# Patient Record
Sex: FEMALE | Race: WHITE | NOT HISPANIC OR LATINO | Employment: OTHER | URBAN - METROPOLITAN AREA
[De-identification: names, ages, dates, MRNs, and addresses within clinical notes are randomized per-mention and may not be internally consistent; named-entity substitution may affect disease eponyms.]

---

## 2017-01-16 ENCOUNTER — MYC REFILL (OUTPATIENT)
Dept: FAMILY MEDICINE | Facility: CLINIC | Age: 52
End: 2017-01-16

## 2017-01-16 DIAGNOSIS — L71.9 ROSACEA: ICD-10-CM

## 2017-01-16 DIAGNOSIS — G47.09 OTHER INSOMNIA: ICD-10-CM

## 2017-01-17 RX ORDER — LORAZEPAM 0.5 MG/1
0.5 TABLET ORAL EVERY 8 HOURS PRN
Qty: 6 TABLET | Refills: 0 | Status: SHIPPED | OUTPATIENT
Start: 2017-01-17 | End: 2017-05-02

## 2017-01-17 NOTE — TELEPHONE ENCOUNTER
OK to fill metroniazole. Due for OV soon.   I have never seen her for insomnia and I do not use lorazepam to tx this. Dr. Chavez started her on this.   Will route to her.   PAULA Michelle, PA-C

## 2017-01-17 NOTE — TELEPHONE ENCOUNTER
Message from MyChart:  Original authorizing provider: FABI Landeros would like a refill of the following medications:  metroNIDAZOLE (METROCREAM) 0.75 % cream [Carmen Ross PA-C]  LORazepam (ATIVAN) 0.5 MG tablet [Carmen Ross PA-C]    Preferred pharmacy: [id number], [place] - [address]    Comment:

## 2017-01-17 NOTE — TELEPHONE ENCOUNTER
Metroniazole refilled per PCP.    Awaiting response from Dr. Chavez for lorazepam      Penelope Shah RN

## 2017-01-17 NOTE — TELEPHONE ENCOUNTER
Metronidazole      Last Written Prescription Date: 9/8/2015  Last Fill Quantity: 100 g,  # refills: 5   Last Office Visit with [facility], [facility] or  [facility] prescribing provider: 8/26/2016    Routing refill request to provider for review/approval because:  A break in medication    Lorazepam      Last Written Prescription Date:  1/2/2017  Last Fill Quantity: 10,   # refills: 0  Last Office Visit with [facility], [facility] or  [facility] prescribing provider: 8/17/2016  Future Office visit:       Routing refill request to provider for review/approval because:  Drug not on the [facility], [facility] or  [facility] refill protocol or controlled substance

## 2017-01-18 NOTE — TELEPHONE ENCOUNTER
Script faxed to [facility]  Patient informed of message below via [employer]  Annmarie Gregroy TC

## 2017-04-17 DIAGNOSIS — F41.9 ANXIETY: ICD-10-CM

## 2017-04-17 DIAGNOSIS — F33.0 MAJOR DEPRESSIVE DISORDER, RECURRENT EPISODE, MILD (H): Primary | ICD-10-CM

## 2017-04-17 DIAGNOSIS — I10 HYPERTENSION, GOAL BELOW 140/90: ICD-10-CM

## 2017-04-17 NOTE — TELEPHONE ENCOUNTER
Chlorthalidone    Last Written Prescription Date: 3/28/16  Last Fill Quantity: 45, # refills: 3  Last Office Visit with [facility], [facility] or  [facility] prescribing provider: 8/17/16       Potassium   Date Value Ref Range Status   08/17/2016 3.7 3.4 - 5.3 mmol/L Final     Creatinine   Date Value Ref Range Status   08/17/2016 0.68 0.52 - 1.04 mg/dL Final     BP Readings from Last 3 Encounters:   08/26/16 130/90   08/17/16 118/80   03/28/16 110/76     Bupropion    Last Written Prescription Date: 3/28/16  Last Fill Quantity: 90; # refills: 3  Last Office Visit with [facility], [facility] or [facility] prescribing provider:  8/17/16        Last PHQ-9 score on record=   PHQ-9 SCORE 8/26/2016   Total Score MyChart -   Total Score 5       Lab Results   Component Value Date    AST 20 08/17/2016     Lab Results   Component Value Date    ALT 32 08/17/2016     AGNIESZKA Frazier LPN

## 2017-04-19 RX ORDER — CHLORTHALIDONE 25 MG/1
TABLET ORAL
Qty: 45 TABLET | Refills: 0 | Status: SHIPPED | OUTPATIENT
Start: 2017-04-19 | End: 2017-05-02

## 2017-04-19 NOTE — TELEPHONE ENCOUNTER
Sent [employer] message asking if patient is still taking the bupropion. This was d/c'd off of medication list  Jesi Snyder RN  [facility]  [phone]

## 2017-04-20 RX ORDER — BUPROPION HYDROCHLORIDE 150 MG/1
TABLET, EXTENDED RELEASE ORAL
Qty: 60 TABLET | Refills: 0 | Status: SHIPPED | OUTPATIENT
Start: 2017-04-20 | End: 2017-05-02

## 2017-04-20 NOTE — TELEPHONE ENCOUNTER
Per sundeep rodas reply- she is still taking this medication and will schedule her f/u in May. phq9 sent to patient to complete  Routing refill request to provider for review/approval because:  Drug not active on patient's medication list      Jesi Snyder RN  [facility]  [phone]

## 2017-05-02 ENCOUNTER — OFFICE VISIT (OUTPATIENT)
Dept: FAMILY MEDICINE | Facility: CLINIC | Age: 52
End: 2017-05-02
Payer: COMMERCIAL

## 2017-05-02 VITALS
BODY MASS INDEX: 34.83 KG/M2 | HEART RATE: 64 BPM | OXYGEN SATURATION: 99 % | HEIGHT: 64 IN | WEIGHT: 204 LBS | TEMPERATURE: 98.3 F | DIASTOLIC BLOOD PRESSURE: 68 MMHG | SYSTOLIC BLOOD PRESSURE: 118 MMHG | RESPIRATION RATE: 16 BRPM

## 2017-05-02 DIAGNOSIS — I10 HYPERTENSION, GOAL BELOW 140/90: ICD-10-CM

## 2017-05-02 DIAGNOSIS — Z12.11 COLON CANCER SCREENING: ICD-10-CM

## 2017-05-02 DIAGNOSIS — E66.01 MORBID OBESITY, UNSPECIFIED OBESITY TYPE (H): ICD-10-CM

## 2017-05-02 DIAGNOSIS — F41.9 ANXIETY: ICD-10-CM

## 2017-05-02 DIAGNOSIS — L71.9 ROSACEA: ICD-10-CM

## 2017-05-02 DIAGNOSIS — F33.0 MAJOR DEPRESSIVE DISORDER, RECURRENT EPISODE, MILD (H): Primary | ICD-10-CM

## 2017-05-02 DIAGNOSIS — G60.9 IDIOPATHIC NEUROPATHY: ICD-10-CM

## 2017-05-02 DIAGNOSIS — G47.09 OTHER INSOMNIA: ICD-10-CM

## 2017-05-02 PROCEDURE — 99214 OFFICE O/P EST MOD 30 MIN: CPT | Performed by: PHYSICIAN ASSISTANT

## 2017-05-02 RX ORDER — GABAPENTIN 300 MG/1
CAPSULE ORAL
Qty: 60 CAPSULE | Refills: 0 | Status: SHIPPED | OUTPATIENT
Start: 2017-05-02 | End: 2017-10-31

## 2017-05-02 RX ORDER — BUPROPION HYDROCHLORIDE 150 MG/1
TABLET, EXTENDED RELEASE ORAL
Qty: 180 TABLET | Refills: 1 | Status: SHIPPED | OUTPATIENT
Start: 2017-05-02 | End: 2017-09-08

## 2017-05-02 RX ORDER — CITALOPRAM HYDROBROMIDE 20 MG/1
20 TABLET ORAL DAILY
Qty: 90 TABLET | Refills: 1 | Status: SHIPPED | OUTPATIENT
Start: 2017-05-02 | End: 2017-11-25

## 2017-05-02 RX ORDER — LORAZEPAM 0.5 MG/1
0.5 TABLET ORAL DAILY PRN
Qty: 10 TABLET | Refills: 0 | Status: SHIPPED | OUTPATIENT
Start: 2017-05-02 | End: 2017-10-31

## 2017-05-02 RX ORDER — CHLORTHALIDONE 25 MG/1
TABLET ORAL
Qty: 45 TABLET | Refills: 1 | Status: SHIPPED | OUTPATIENT
Start: 2017-05-02 | End: 2017-11-25

## 2017-05-02 NOTE — PATIENT INSTRUCTIONS
1. Start new medication - Gabapentin 300 mg at bedtime.   2. Let me know about pap smear.   3. Refills also sent.   4. Schedule colonoscopy

## 2017-05-02 NOTE — PROGRESS NOTES
"Chief Complaint   Patient presents with     Recheck Medication       Initial /68  Pulse 64  Temp 98.3  F (36.8  C)  Resp 16  Ht 5' 4\" (1.626 m)  Wt 204 lb (92.5 kg)  SpO2 99%  BMI 35.02 kg/m2 Estimated body mass index is 35.02 kg/(m^2) as calculated from the following:    Height as of this encounter: 5' 4\" (1.626 m).    Weight as of this encounter: 204 lb (92.5 kg).  Medication Reconciliation: complete. AGNIESZKA Frazier LPN        SUBJECTIVE:                                                    Taylor Baeza is a 51 year old female who presents to clinic today for the following health issues:      Medication Followup of Wellbutrin / ativan    Taking Medication as prescribed: yes - has been out of Ativan for awhile    Side Effects:  Fatigue and fuzzy headed    Medication Helping Symptoms:  Wants to discuss     Patient complains of feeling tired and fuzzy-headed; this has been ongoing and intermittent for a long time.   Wellbutrin 300, celexa 20. Ativan PRN. Last fill 1/17 for #6.     #2- HTN -Patient denies any chest pain, shortness of breath, orthopnea, PND, headaches, edema, vision changes. Due for refills. Labs last in August 16.     #3- Idiopathic neuropathy per neuro - taking wellbutrin and celexa for this - if misses a dose, her symptoms worsen.       -------------------------------------    Problem list and histories reviewed & adjusted, as indicated.  Additional history: as documented    Patient Active Problem List   Diagnosis     Hypothyroidism     Morbid obesity (H)     Rosacea     CARDIOVASCULAR SCREENING; LDL GOAL LESS THAN 130     Plantar fasciitis     Anxiety     Enthesopathy of ankle and tarsus     Snoring     GERD (gastroesophageal reflux disease)     Hypertension, goal below 140/90     Panic attacks     Vitamin D deficiency     Paresthesia     Hyperglycemia     Sleep disorder     Mild major depression (H)     Cystic acne     Other specified hypothyroidism     Hyperlipidemia LDL goal <130     " Hypertension     Hypothyroid     Other insomnia     Idiopathic neuropathy     Past Surgical History:   Procedure Laterality Date     TONSILLECTOMY  1970       Social History   Substance Use Topics     Smoking status: Never Smoker     Smokeless tobacco: Never Used     Alcohol use 0.0 oz/week     0 Standard drinks or equivalent per week      Comment: 2 x week      Family History   Problem Relation Age of Onset     DIABETES Sister      Thyroid Disease Father      Hypertension Father      Hypertension Mother      DIABETES Maternal Grandmother      Breast Cancer No family hx of      Cancer - colorectal No family hx of      C.A.D. No family hx of          Current Outpatient Prescriptions   Medication Sig Dispense Refill     gabapentin (NEURONTIN) 300 MG capsule Take 1 tablet (300 mg) every night, by mouth 60 capsule 0     buPROPion (WELLBUTRIN SR) 150 MG 12 hr tablet TAKE ONE TABLET BY MOUTH TWO TIMES A  tablet 1     chlorthalidone (HYGROTON) 25 MG tablet TAKE 1/2 TABLET BY MOUTH DAILY 45 tablet 1     metroNIDAZOLE (METROCREAM) 0.75 % cream 1 application at night for rosacea. 100 g 3     citalopram (CELEXA) 20 MG tablet Take 1 tablet (20 mg) by mouth daily 90 tablet 1     LORazepam (ATIVAN) 0.5 MG tablet Take 1 tablet (0.5 mg) by mouth daily as needed for anxiety 10 tablet 0     levothyroxine (SYNTHROID, LEVOTHROID) 112 MCG tablet TAKE 1 TABLET (112 MCG) BY MOUTH DAILY 90 tablet 3     fluocinolone acetonide 0.01 % OIL Place 5 drops in ear(s) 2 times daily as needed 60 mL 5     Cholecalciferol (VITAMIN D) 1000 UNITS capsule Take 2 capsules by mouth daily       fish oil-omega-3 fatty acids (OMEGA 3) 1000 MG capsule Take 1 capsule by mouth daily.       [DISCONTINUED] buPROPion (WELLBUTRIN SR) 150 MG 12 hr tablet TAKE ONE TABLET BY MOUTH TWO TIMES A DAY 60 tablet 0     [DISCONTINUED] chlorthalidone (HYGROTON) 25 MG tablet TAKE 1/2 TABLET BY MOUTH DAILY 45 tablet 0     [DISCONTINUED] citalopram (CELEXA) 20 MG tablet  "Take 1 tablet (20 mg) by mouth daily 30 tablet 1     Allergies   Allergen Reactions     No Known Allergies      Labs reviewed in EPIC    Reviewed and updated as needed this visit by clinical staff  Tobacco  Allergies  Meds  Fam Hx  Soc Hx      Reviewed and updated as needed this visit by Provider         Social History     Social History     Marital status:      Spouse name: N/A     Number of children: 1     Years of education: N/A     Occupational History     Teacher [employer]     Social History Main Topics     Smoking status: Never Smoker     Smokeless tobacco: Never Used     Alcohol use 0.0 oz/week     0 Standard drinks or equivalent per week      Comment: 2 x week      Drug use: No     Sexual activity: Yes     Partners: Male     Birth control/ protection: Post-menopausal     Other Topics Concern     None     Social History Narrative    7/2012        Children-1     Work-teacher     Tobacco- none    ETOH- 1 Q 2 weeks    Exercise-none                10 point review of systems negative other than symptoms noted above.   Constitutional, HEENT, CV, pulmonary, GI, , MS, Endo, Psych systems are all negative, except as otherwise noted.       OBJECTIVE:  /68  Pulse 64  Temp 98.3  F (36.8  C)  Resp 16  Ht 5' 4\" (1.626 m)  Wt 204 lb (92.5 kg)  SpO2 99%  BMI 35.02 kg/m2  CONSTITUTIONAL: Alert, well-nourished, well-groomed, NAD  RESP: Lungs CTA. No wheeze, rhonchi, rales. Normal effort on room air. Equal lung sounds bilaterally.   CV: HRRR, normal S1, S2. No MRG. No peripheral edema.  DERM: No rashes or suspicious lesions  PSYCH: Alert and oriented. Thought process is clear and goal-directed. Adequate insight and judgement. Mood - normal. Affect - normal.      Diagnostic Tests:  None today    ASSESSMENT/PLAN:  (F33.0) Major depressive disorder, recurrent episode, mild (H)  (primary encounter diagnosis)  Comment:   Plan: buPROPion (WELLBUTRIN SR) 150 MG 12 hr tablet,         " citalopram (CELEXA) 20 MG tablet            (G60.9) Idiopathic neuropathy  Comment: Will start her on low dose gabapentin in hopes of helping with her neuropathy and anxiety a little more.   She will update me in 2 weeks and we could consider increasing this   Plan: gabapentin (NEURONTIN) 300 MG capsule            (E66.01) Morbid obesity, unspecified obesity type (H)  Comment:   Plan: LSMs for weight loss - she wants to work on this to help with energy and moods.     (F41.9) Anxiety  Comment: as above.   Plan: gabapentin (NEURONTIN) 300 MG capsule,         buPROPion (WELLBUTRIN SR) 150 MG 12 hr tablet            (I10) Hypertension, goal below 140/90  Comment: stable. Labs due end of summer.   Plan: chlorthalidone (HYGROTON) 25 MG tablet            (L71.9) Rosacea  Comment: Ran out - refill today.   Plan: metroNIDAZOLE (METROCREAM) 0.75 % cream            (G47.09) Other insomnia  Comment: to use sparingly for anxiety, insomnia.   Plan: LORazepam (ATIVAN) 0.5 MG tablet            (Z12.11) Colon cancer screening  Comment: Will plan to do this summer after school year is over.   Plan: GASTROENTEROLOGY ADULT REF PROCEDURE ONLY              FOLLOW-UP: update me in 2 weeks on gabapentin/sxs. Sooner PRN.   The patient agrees with this assessment and plan and agrees to call or return to the clinic with any questions or concerns or if their condition worsens.    Carmen Ross, PAULA, PA-C  [facility]

## 2017-05-02 NOTE — MR AVS SNAPSHOT
After Visit Summary   5/2/2017    Taylor Baeza    MRN: [id number]           Patient Information     Date Of Birth          1965        Visit Information        Provider Department      5/2/2017 11:20 AM Carmen Ross PA-C [facility] [place]        Today's Diagnoses     Idiopathic neuropathy    -  1    Major depressive disorder, recurrent episode, mild (H)        Anxiety        Hypertension, goal below 140/90        Rosacea        Other insomnia        Colon cancer screening          Care Instructions    1. Start new medication - Gabapentin 300 mg at bedtime.   2. Let me know about pap smear.   3. Refills also sent.   4. Schedule colonoscopy        Follow-ups after your visit        Additional Services     GASTROENTEROLOGY ADULT REF PROCEDURE ONLY       Last Lab Result: Creatinine (mg/dL)       Date                     Value                 08/17/2016               0.68             ----------  Body mass index is 35.02 kg/(m^2).      Patient will be contacted to schedule procedure.     Please be aware that coverage of these services is subject to the terms and limitations of your health insurance plan.  Call member services at your health plan with any benefit or coverage questions.  Any procedures must be performed at a [facility] facility OR coordinated by your clinic's referral office.    Please bring the following with you to your appointment:    (1) Any X-Rays, CTs or MRIs which have been performed.  Contact the facility where they were done to arrange for  prior to your scheduled appointment.    (2) List of current medications   (3) This referral request   (4) Any documents/labs given to you for this referral                  Who to contact     If you have questions or need follow up information about today's clinic visit or your schedule please contact [facility] [place] directly at [phone].  Normal or non-critical lab and imaging results will be  "communicated to you by [employer], letter or phone within 4 business days after the clinic has received the results. If you do not hear from us within 7 days, please contact the clinic through [employer] or phone. If you have a critical or abnormal lab result, we will notify you by phone as soon as possible.  Submit refill requests through [employer] or call your pharmacy and they will forward the refill request to us. Please allow 3 business days for your refill to be completed.          Additional Information About Your Visit        [employer] Information     [employer] gives you secure access to your electronic health record. If you see a primary care provider, you can also send messages to your care team and make appointments. If you have questions, please call your primary care clinic.  If you do not have a primary care provider, please call [phone] and they will assist you.        Care EveryWhere ID     This is your Care EveryWhere ID. This could be used by other organizations to access your [place] medical records  [id number]        Your Vitals Were     Pulse Temperature Respirations Height Pulse Oximetry BMI (Body Mass Index)    64 98.3  F (36.8  C) 16 5' 4\" (1.626 m) 99% 35.02 kg/m2       Blood Pressure from Last 3 Encounters:   05/02/17 118/68   08/26/16 130/90   08/17/16 118/80    Weight from Last 3 Encounters:   05/02/17 204 lb (92.5 kg)   08/26/16 204 lb (92.5 kg)   08/17/16 203 lb (92.1 kg)              We Performed the Following     GASTROENTEROLOGY ADULT REF PROCEDURE ONLY          Today's Medication Changes          These changes are accurate as of: 5/2/17 11:47 AM.  If you have any questions, ask your nurse or doctor.               Start taking these medicines.        Dose/Directions    gabapentin 300 MG capsule   Commonly known as:  NEURONTIN   Used for:  Idiopathic neuropathy, Anxiety   Started by:  Carmen Ross PA-C        Take 1 tablet (300 mg) every night, by mouth   Quantity:  60 " capsule   Refills:  0         These medicines have changed or have updated prescriptions.        Dose/Directions    buPROPion 150 MG 12 hr tablet   Commonly known as:  WELLBUTRIN SR   This may have changed:  See the new instructions.   Used for:  Major depressive disorder, recurrent episode, mild (H), Anxiety   Changed by:  Carmen Ross PA-C        TAKE ONE TABLET BY MOUTH TWO TIMES A DAY   Quantity:  180 tablet   Refills:  1       chlorthalidone 25 MG tablet   Commonly known as:  HYGROTON   This may have changed:  See the new instructions.   Used for:  Hypertension, goal below 140/90   Changed by:  Carmen Ross PA-C        TAKE 1/2 TABLET BY MOUTH DAILY   Quantity:  45 tablet   Refills:  1       LORazepam 0.5 MG tablet   Commonly known as:  ATIVAN   This may have changed:  when to take this   Used for:  Other insomnia   Changed by:  Carmen Ross PA-C        Dose:  0.5 mg   Take 1 tablet (0.5 mg) by mouth daily as needed for anxiety   Quantity:  10 tablet   Refills:  0       metroNIDAZOLE 0.75 % cream   Commonly known as:  METROCREAM   This may have changed:  additional instructions   Used for:  Rosacea   Changed by:  Carmen Ross PA-C        1 application at night for rosacea.   Quantity:  100 g   Refills:  3            Where to get your medicines      These medications were sent to [id number] - [place], [place] - [address]  [address] [address]     Phone:  [phone]     buPROPion 150 MG 12 hr tablet    chlorthalidone 25 MG tablet    citalopram 20 MG tablet    gabapentin 300 MG capsule    metroNIDAZOLE 0.75 % cream         Some of these will need a paper prescription and others can be bought over the counter.  Ask your nurse if you have questions.     Bring a paper prescription for each of these medications     LORazepam 0.5 MG tablet                Primary Care Provider Office Phone # Fax #    Carmen Ross PA-C  [phone]       [facility] [place] [address]  [place] [address]        Thank you!     Thank you for choosing [facility] [place]  for your care. Our goal is always to provide you with excellent care. Hearing back from our patients is one way we can continue to improve our services. Please take a few minutes to complete the written survey that you may receive in the mail after your visit with us. Thank you!             Your Updated Medication List - Protect others around you: Learn how to safely use, store and throw away your medicines at www.disposemymeds.org.          This list is accurate as of: 5/2/17 11:47 AM.  Always use your most recent med list.                   Brand Name Dispense Instructions for use    buPROPion 150 MG 12 hr tablet    WELLBUTRIN SR    180 tablet    TAKE ONE TABLET BY MOUTH TWO TIMES A DAY       chlorthalidone 25 MG tablet    HYGROTON    45 tablet    TAKE 1/2 TABLET BY MOUTH DAILY       citalopram 20 MG tablet    celeXA    90 tablet    Take 1 tablet (20 mg) by mouth daily       fish oil-omega-3 fatty acids 1000 MG capsule      Take 1 capsule by mouth daily.       fluocinolone acetonide 0.01 % Oil     60 mL    Place 5 drops in ear(s) 2 times daily as needed       gabapentin 300 MG capsule    NEURONTIN    60 capsule    Take 1 tablet (300 mg) every night, by mouth       levothyroxine 112 MCG tablet    SYNTHROID/LEVOTHROID    90 tablet    TAKE 1 TABLET (112 MCG) BY MOUTH DAILY       LORazepam 0.5 MG tablet    ATIVAN    10 tablet    Take 1 tablet (0.5 mg) by mouth daily as needed for anxiety       metroNIDAZOLE 0.75 % cream    METROCREAM    100 g    1 application at night for rosacea.       vitamin D 1000 UNITS capsule      Take 2 capsules by mouth daily

## 2017-06-07 ENCOUNTER — TELEPHONE (OUTPATIENT)
Dept: PALLIATIVE MEDICINE | Facility: CLINIC | Age: 52
End: 2017-06-07

## 2017-06-07 NOTE — TELEPHONE ENCOUNTER
Pain Management Center Referral      1. Confirmed address with patient? Yes  2. Confirmed phone number with patient? Yes  3. Confirmed referring provider? Yes  4. Is the PCP the same as the referring provider? Yes  5. Has the patient been to any previous pain clinics? No  (If yes, send NOHEMI with welcome letter)  6. Which insurance are we to bill for this appointment?  HP    7. Informed pt of cancellation (48 hour) policy? Yes    REGARDING OPIOID MEDICATIONS: We will always address appropriateness of opioid pain medications, but we generally will not automatically take on a prescribing role. When we do take on prescribing of opioids for chronic pain, it is in collaboration with the referring physician for an intermediate period of time (months), with an expectation that the primary physician or provider will assume the prescribing role if medications are effective at stable doses with demonstrated compliance. Therefore, please do not assume that your prescribing responsibilities end on the day of pain clinic consultation.  7. Informed pt of prescribing policy? Yes      8. Referring Provider: Carmen Ross    [facility]

## 2017-06-07 NOTE — LETTER
June 7, 2017    Taylor Baeza  [address] [place] [address]    Dear Taylor,                                                                 Welcome to the [facility] at the [facility] [place]. We are located on the 6th floor, [address], of the [facility] located at [address] [place] [address]. For general parking, the [address] is the closest to our building.     Your appointment at the [facility] has been scheduled on Wednesday, July 19TH at 8:00AM with Gemini Neal NP.    At your first visit, you will meet your team of caregivers who will help you to develop pain management strategies that will last a lifetime. You will meet with our support staff to validate parking at a reduced rate, review your insurance information, and collect your co-payment if required by your insurance company. You will also meet with a medical pain specialist and care coordinator who will assess your pain and develop a plan of care for your successful pain rehabilitation. You should expect to spend 1-2 hours at your first visit with us. Usually, patients work with us for a period of 6-12 months, and eventually return to their primary doctor once their pain management has stabilized.      To help us make your visit go as smoothly as possible, please bring the following items with you on your visit:   Completed Pain Questionnaire enclosed in this packet.  If you do not bring the completed questionnaire, we may have to reschedule your appointment.  List of any medicines that you are currently taking or have been prescribed  Important NON-[facility] medical information such as medical records or tests results (X-rays, or laboratory tests)  Your health insurance card  Financial resources to cover your co-payment or balance due at the time of service (cash, personal check, Visa, and MasterCard are acceptable methods of  payment)     Due to the demand for new patient evaluations, you must notify the scheduling department 48 hours in advance if you are not able to keep this appointment.  Failure to do so could affect your ability to reschedule with our clinic. Please do not assume that you will  receive any prescription medications at your first visit.    Please call [phone] with any questions regarding your appointment.  We look forward to meeting you and working to address your health care needs.       Sincerely,      [facility]

## 2017-09-08 ENCOUNTER — RADIANT APPOINTMENT (OUTPATIENT)
Dept: GENERAL RADIOLOGY | Facility: CLINIC | Age: 52
End: 2017-09-08
Attending: FAMILY MEDICINE
Payer: COMMERCIAL

## 2017-09-08 ENCOUNTER — OFFICE VISIT (OUTPATIENT)
Dept: FAMILY MEDICINE | Facility: CLINIC | Age: 52
End: 2017-09-08
Payer: COMMERCIAL

## 2017-09-08 VITALS
DIASTOLIC BLOOD PRESSURE: 80 MMHG | HEART RATE: 60 BPM | OXYGEN SATURATION: 99 % | BODY MASS INDEX: 34.15 KG/M2 | TEMPERATURE: 97.9 F | RESPIRATION RATE: 16 BRPM | SYSTOLIC BLOOD PRESSURE: 128 MMHG | WEIGHT: 200 LBS | HEIGHT: 64 IN

## 2017-09-08 DIAGNOSIS — R76.11 POSITIVE TB TEST: Primary | ICD-10-CM

## 2017-09-08 DIAGNOSIS — R76.11 POSITIVE TB TEST: ICD-10-CM

## 2017-09-08 PROCEDURE — 36415 COLL VENOUS BLD VENIPUNCTURE: CPT | Performed by: FAMILY MEDICINE

## 2017-09-08 PROCEDURE — 86480 TB TEST CELL IMMUN MEASURE: CPT | Performed by: FAMILY MEDICINE

## 2017-09-08 PROCEDURE — 71020 XR CHEST 2 VW: CPT

## 2017-09-08 PROCEDURE — 99214 OFFICE O/P EST MOD 30 MIN: CPT | Performed by: FAMILY MEDICINE

## 2017-09-08 ASSESSMENT — ANXIETY QUESTIONNAIRES
1. FEELING NERVOUS, ANXIOUS, OR ON EDGE: SEVERAL DAYS
GAD7 TOTAL SCORE: 5
3. WORRYING TOO MUCH ABOUT DIFFERENT THINGS: SEVERAL DAYS
7. FEELING AFRAID AS IF SOMETHING AWFUL MIGHT HAPPEN: SEVERAL DAYS
6. BECOMING EASILY ANNOYED OR IRRITABLE: SEVERAL DAYS
2. NOT BEING ABLE TO STOP OR CONTROL WORRYING: SEVERAL DAYS
IF YOU CHECKED OFF ANY PROBLEMS ON THIS QUESTIONNAIRE, HOW DIFFICULT HAVE THESE PROBLEMS MADE IT FOR YOU TO DO YOUR WORK, TAKE CARE OF THINGS AT HOME, OR GET ALONG WITH OTHER PEOPLE: SOMEWHAT DIFFICULT
5. BEING SO RESTLESS THAT IT IS HARD TO SIT STILL: NOT AT ALL

## 2017-09-08 ASSESSMENT — PATIENT HEALTH QUESTIONNAIRE - PHQ9
SUM OF ALL RESPONSES TO PHQ QUESTIONS 1-9: 1
5. POOR APPETITE OR OVEREATING: NOT AT ALL

## 2017-09-08 NOTE — PROGRESS NOTES
"Chief Complaint   Patient presents with     Results     positive TB       Initial /80  Pulse 60  Temp 97.9  F (36.6  C)  Resp 16  Ht 5' 4\" (1.626 m)  Wt 200 lb (90.7 kg)  SpO2 99%  BMI 34.33 kg/m2 Estimated body mass index is 34.33 kg/(m^2) as calculated from the following:    Height as of this encounter: 5' 4\" (1.626 m).    Weight as of this encounter: 200 lb (90.7 kg).  Medication Reconciliation: complete. AGNIESZKA Frazier LPN        SUBJECTIVE:   Taylor Baeza is a 52 year old female who presents to clinic today for the following health issues:      Concern - discuss recent positive TB test.  Patient is pleasant 52 year old female who came today to discuss positive quantiferon gold test.  She has been volunteering in different medical care facility and takes her therapy dog . She has recent travel to [place].  She has similar test done in march of this year which was negative, and later in August she did it again it turn positive.  She does not have nay active symptoms. No chest pain, no shortness of breath, no cough , blood in cough, no weight loss.  He is  in elementary school.   Denies any know exposure.   she does go to different medical facilities for volunteering. Denies nay know  Exposure.            Reviewed and updated as needed this visit by clinical staffTobacco  Allergies  Meds  Fam Hx  Soc Hx      Reviewed and updated as needed this visit by Provider         ROS:  C: NEGATIVE for fever, chills, change in weight  E/M: NEGATIVE for ear, mouth and throat problems  R: NEGATIVE for significant cough or SOB  CV: NEGATIVE for chest pain, palpitations or peripheral edema    OBJECTIVE:                                                    /80  Pulse 60  Temp 97.9  F (36.6  C)  Resp 16  Ht 5' 4\" (1.626 m)  Wt 200 lb (90.7 kg)  SpO2 99%  BMI 34.33 kg/m2  Body mass index is 34.33 kg/(m^2).   GENERAL: healthy, alert, well nourished, well hydrated, no " distress  HENT: ear canals- normal; TMs- normal; Nose- normal; Mouth- no ulcers, no lesions  NECK: no tenderness, no adenopathy, no asymmetry, no masses, no stiffness; thyroid- normal to palpation  RESP: lungs clear to auscultation - no rales, no rhonchi, no wheezes  CV: regular rates and rhythm, normal S1 S2, no S3 or S4 and no murmur, no click or rub -  ABDOMEN: soft, no tenderness, no  hepatosplenomegaly, no masses, normal bowel sounds         ASSESSMENT/PLAN:                                                        ICD-10-CM    1. Positive TB test R76.11 M Tuberculosis by Quantiferon     XR Chest 2 Views       Patient has recent positive Quantiferon gold with previous negative test. She is asymptomatic  Her CXR is normal. Will recheck Quantiferon gold test again. If positive she may have underlain latent TB, will refer her to ID for any evaluation or treatment if necessary.    Honorio Castellanos MD  [facility]

## 2017-09-08 NOTE — MR AVS SNAPSHOT
"              After Visit Summary   9/8/2017    Taylor Baeza    MRN: [id number]           Patient Information     Date Of Birth          1965        Visit Information        Provider Department      9/8/2017 10:20 AM Honorio Castellanos MD [facility] [place]        Today's Diagnoses     Positive TB test    -  1       Follow-ups after your visit        Who to contact     If you have questions or need follow up information about today's clinic visit or your schedule please contact [facility] [place] directly at [phone].  Normal or non-critical lab and imaging results will be communicated to you by [employer], letter or phone within 4 business days after the clinic has received the results. If you do not hear from us within 7 days, please contact the clinic through [employer] or phone. If you have a critical or abnormal lab result, we will notify you by phone as soon as possible.  Submit refill requests through [employer] or call your pharmacy and they will forward the refill request to us. Please allow 3 business days for your refill to be completed.          Additional Information About Your Visit        MyChart Information     [employer] gives you secure access to your electronic health record. If you see a primary care provider, you can also send messages to your care team and make appointments. If you have questions, please call your primary care clinic.  If you do not have a primary care provider, please call [phone] and they will assist you.        Care EveryWhere ID     This is your Care EveryWhere ID. This could be used by other organizations to access your [place] medical records  [id number]        Your Vitals Were     Pulse Temperature Respirations Height Pulse Oximetry BMI (Body Mass Index)    60 97.9  F (36.6  C) 16 5' 4\" (1.626 m) 99% 34.33 kg/m2       Blood Pressure from Last 3 Encounters:   09/08/17 128/80   05/02/17 118/68   08/26/16 130/90    Weight from Last 3 Encounters: "   09/08/17 200 lb (90.7 kg)   05/02/17 204 lb (92.5 kg)   08/26/16 204 lb (92.5 kg)              We Performed the Following     M Tuberculosis by Quantiferon        Primary Care Provider Office Phone # Fax #    Carmen Ross PA-C [phone]       [address]  [place] [address]        Equal Access to Services     [facility]: Hadii aad ku hadasho Soomaali, waaxda luqadaha, qaybta kaalmada adeegyada, waxay idiin hayaan adeeg kharash la'aan . So [facility] [phone].    ATENCIÓN: Si habla español, tiene a rome disposición servicios gratuitos de asistencia lingüística. [facility] [phone].    We comply with applicable federal civil rights laws and [place] laws. We do not discriminate on the basis of race, color, national origin, age, disability sex, sexual orientation or gender identity.            Thank you!     Thank you for choosing [facility] [place]  for your care. Our goal is always to provide you with excellent care. Hearing back from our patients is one way we can continue to improve our services. Please take a few minutes to complete the written survey that you may receive in the mail after your visit with us. Thank you!             Your Updated Medication List - Protect others around you: Learn how to safely use, store and throw away your medicines at www.disposemymeds.org.          This list is accurate as of: 9/8/17 11:07 AM.  Always use your most recent med list.                   Brand Name Dispense Instructions for use Diagnosis    chlorthalidone 25 MG tablet    HYGROTON    45 tablet    TAKE 1/2 TABLET BY MOUTH DAILY    Hypertension, goal below 140/90       citalopram 20 MG tablet    celeXA    90 tablet    Take 1 tablet (20 mg) by mouth daily    Major depressive disorder, recurrent episode, mild (H)       fish oil-omega-3 fatty acids 1000 MG capsule      Take 1 capsule by mouth daily.        fluocinolone acetonide 0.01 % Oil     60 mL    Place 5 drops  in ear(s) 2 times daily as needed    Otitis externa, unspecified laterality       gabapentin 300 MG capsule    NEURONTIN    60 capsule    Take 1 tablet (300 mg) every night, by mouth    Idiopathic neuropathy, Anxiety       levothyroxine 112 MCG tablet    SYNTHROID/LEVOTHROID    90 tablet    TAKE 1 TABLET (112 MCG) BY MOUTH DAILY    Hypothyroidism, unspecified type       LORazepam 0.5 MG tablet    ATIVAN    10 tablet    Take 1 tablet (0.5 mg) by mouth daily as needed for anxiety    Other insomnia       metroNIDAZOLE 0.75 % cream    METROCREAM    100 g    1 application at night for rosacea.    Rosacea       vitamin D 1000 UNITS capsule      Take 2 capsules by mouth daily

## 2017-09-09 ASSESSMENT — ANXIETY QUESTIONNAIRES: GAD7 TOTAL SCORE: 5

## 2017-09-11 LAB
M TB TUBERC IFN-G BLD QL: NEGATIVE
M TB TUBERC IFN-G/MITOGEN IGNF BLD: 0.21 IU/ML

## 2017-09-17 ENCOUNTER — HEALTH MAINTENANCE LETTER (OUTPATIENT)
Age: 52
End: 2017-09-17

## 2017-09-27 DIAGNOSIS — E03.9 HYPOTHYROIDISM, UNSPECIFIED TYPE: ICD-10-CM

## 2017-09-27 NOTE — TELEPHONE ENCOUNTER
levothyroxine (SYNTHROID, LEVOTHROID) 112 MCG     Last Written Prescription Date: 8/29/16  Last Quantity: 90, # refills: 3  Last Office Visit with G, P or [facility] prescribing provider: 9/8/17   Next 5 appointments (look out 90 days)     Oct 31, 2017 12:00 PM CDT   PHYSICAL with Lauren Brown MD   [facility] ([facility])    [address] [place] [id number] [phone]                   TSH   Date Value Ref Range Status   08/17/2016 0.15 (L) 0.40 - 4.00 mU/L Final

## 2017-09-28 RX ORDER — LEVOTHYROXINE SODIUM 112 UG/1
TABLET ORAL
Qty: 30 TABLET | Refills: 0 | Status: SHIPPED | OUTPATIENT
Start: 2017-09-28 | End: 2017-11-25

## 2017-09-28 NOTE — TELEPHONE ENCOUNTER
OKd #30 - future labs ordered. Needs to come for lab before any further refills.   PAULA Michelle, PAJesC

## 2017-09-28 NOTE — TELEPHONE ENCOUNTER
Routing refill request to provider for review/approval because:  Labs out of range:  TSH  Labs not current:  TSH  Per chart review, patient has appt for physical next month 10/31/17 at [facility] clinic     Penelope Shah RN

## 2017-10-02 ENCOUNTER — MYC MEDICAL ADVICE (OUTPATIENT)
Dept: FAMILY MEDICINE | Facility: CLINIC | Age: 52
End: 2017-10-02

## 2017-10-02 DIAGNOSIS — G47.09 OTHER INSOMNIA: ICD-10-CM

## 2017-10-02 RX ORDER — LORAZEPAM 0.5 MG/1
0.5 TABLET ORAL DAILY PRN
Qty: 10 TABLET | Refills: 0 | Status: CANCELLED | OUTPATIENT
Start: 2017-10-02

## 2017-10-02 NOTE — TELEPHONE ENCOUNTER
Patient notified with information noted below from provider and states now back at school she is having difficulty un-winding and not being able to fall asleep.  Ok with not having Lorazepam and will  something OTC to help sleep.  Mee Mulligan RN - Triage  [facility]

## 2017-10-02 NOTE — TELEPHONE ENCOUNTER
Please see MyChart message below and advise.     lorazepam      Last Written Prescription Date:  5/2/17  Last Fill Quantity: 10,   # refills: 0  Last Office Visit with [facility], [facility] or [facility] prescribing provider: 9/8/17  Future Office visit:    Next 5 appointments (look out 90 days)     Oct 31, 2017 12:00 PM CDT   PHYSICAL with Lauren Brown MD   [facility]    [address] [place] [id number] [phone]                   Routing refill request to provider for review/approval because:  Drug not on the [facility], [facility] or [facility] refill protocol or controlled substance    Jeanette Meyers RN   [facility] - Triage

## 2017-10-02 NOTE — TELEPHONE ENCOUNTER
Non-detailed message left to return our call.  Mee Mulligan RN - Triage  [facility]

## 2017-10-02 NOTE — TELEPHONE ENCOUNTER
Patient was prescribed this medication one time with limited supply. If she has issues with sleeping she should follow up in the clinic to discuss further.

## 2017-10-11 NOTE — TELEPHONE ENCOUNTER
Pt has not read [employer] message. Called pt, advised she needs lab only appt prior to anymore refills, she will call back to schedule.Jean CUETO CMA

## 2017-10-19 ENCOUNTER — TRANSFERRED RECORDS (OUTPATIENT)
Dept: HEALTH INFORMATION MANAGEMENT | Facility: CLINIC | Age: 52
End: 2017-10-19

## 2017-10-19 ENCOUNTER — SURGERY (OUTPATIENT)
Age: 52
End: 2017-10-19

## 2017-10-19 ENCOUNTER — HOSPITAL ENCOUNTER (OUTPATIENT)
Facility: CLINIC | Age: 52
Discharge: HOME | End: 2017-10-19
Attending: INTERNAL MEDICINE | Admitting: INTERNAL MEDICINE
Payer: COMMERCIAL

## 2017-10-19 VITALS
RESPIRATION RATE: 13 BRPM | BODY MASS INDEX: 35.85 KG/M2 | DIASTOLIC BLOOD PRESSURE: 79 MMHG | HEIGHT: 64 IN | OXYGEN SATURATION: 94 % | WEIGHT: 210 LBS | SYSTOLIC BLOOD PRESSURE: 119 MMHG

## 2017-10-19 LAB — COLONOSCOPY: NORMAL

## 2017-10-19 PROCEDURE — 45380 COLONOSCOPY AND BIOPSY: CPT | Mod: PT | Performed by: INTERNAL MEDICINE

## 2017-10-19 PROCEDURE — 25000128 H RX IP 250 OP 636: Performed by: INTERNAL MEDICINE

## 2017-10-19 PROCEDURE — G0500 MOD SEDAT ENDO SERVICE >5YRS: HCPCS | Performed by: INTERNAL MEDICINE

## 2017-10-19 PROCEDURE — 88305 TISSUE EXAM BY PATHOLOGIST: CPT | Performed by: INTERNAL MEDICINE

## 2017-10-19 PROCEDURE — 88305 TISSUE EXAM BY PATHOLOGIST: CPT | Mod: 26 | Performed by: INTERNAL MEDICINE

## 2017-10-19 RX ORDER — ONDANSETRON 2 MG/ML
4 INJECTION INTRAMUSCULAR; INTRAVENOUS
Status: DISCONTINUED | OUTPATIENT
Start: 2017-10-19 | End: 2017-10-19 | Stop reason: HOSPADM

## 2017-10-19 RX ORDER — LIDOCAINE 40 MG/G
CREAM TOPICAL
Status: DISCONTINUED | OUTPATIENT
Start: 2017-10-19 | End: 2017-10-19 | Stop reason: HOSPADM

## 2017-10-19 RX ORDER — FENTANYL CITRATE 50 UG/ML
INJECTION, SOLUTION INTRAMUSCULAR; INTRAVENOUS PRN
Status: DISCONTINUED | OUTPATIENT
Start: 2017-10-19 | End: 2017-10-19 | Stop reason: HOSPADM

## 2017-10-19 RX ADMIN — FENTANYL CITRATE 100 MCG: 50 INJECTION, SOLUTION INTRAMUSCULAR; INTRAVENOUS at 08:37

## 2017-10-19 RX ADMIN — MIDAZOLAM HYDROCHLORIDE 2 MG: 1 INJECTION, SOLUTION INTRAMUSCULAR; INTRAVENOUS at 08:39

## 2017-10-19 RX ADMIN — MIDAZOLAM HYDROCHLORIDE 2 MG: 1 INJECTION, SOLUTION INTRAMUSCULAR; INTRAVENOUS at 08:37

## 2017-10-20 LAB — COPATH REPORT: NORMAL

## 2017-10-31 ENCOUNTER — RADIANT APPOINTMENT (OUTPATIENT)
Dept: MAMMOGRAPHY | Facility: CLINIC | Age: 52
End: 2017-10-31
Payer: COMMERCIAL

## 2017-10-31 ENCOUNTER — OFFICE VISIT (OUTPATIENT)
Dept: OBGYN | Facility: CLINIC | Age: 52
End: 2017-10-31
Payer: COMMERCIAL

## 2017-10-31 VITALS
SYSTOLIC BLOOD PRESSURE: 118 MMHG | HEART RATE: 68 BPM | DIASTOLIC BLOOD PRESSURE: 80 MMHG | HEIGHT: 64 IN | BODY MASS INDEX: 34.89 KG/M2 | WEIGHT: 204.4 LBS

## 2017-10-31 DIAGNOSIS — Z12.31 VISIT FOR SCREENING MAMMOGRAM: ICD-10-CM

## 2017-10-31 DIAGNOSIS — E03.9 HYPOTHYROIDISM, UNSPECIFIED TYPE: ICD-10-CM

## 2017-10-31 DIAGNOSIS — Z01.419 ENCOUNTER FOR GYNECOLOGICAL EXAMINATION WITHOUT ABNORMAL FINDING: Primary | ICD-10-CM

## 2017-10-31 DIAGNOSIS — Z13.21 ENCOUNTER FOR VITAMIN DEFICIENCY SCREENING: ICD-10-CM

## 2017-10-31 PROCEDURE — 82306 VITAMIN D 25 HYDROXY: CPT | Performed by: OBSTETRICS & GYNECOLOGY

## 2017-10-31 PROCEDURE — G0202 SCR MAMMO BI INCL CAD: HCPCS | Mod: TC

## 2017-10-31 PROCEDURE — 84439 ASSAY OF FREE THYROXINE: CPT | Performed by: OBSTETRICS & GYNECOLOGY

## 2017-10-31 PROCEDURE — 99396 PREV VISIT EST AGE 40-64: CPT | Performed by: OBSTETRICS & GYNECOLOGY

## 2017-10-31 PROCEDURE — G0145 SCR C/V CYTO,THINLAYER,RESCR: HCPCS | Performed by: OBSTETRICS & GYNECOLOGY

## 2017-10-31 PROCEDURE — 77063 BREAST TOMOSYNTHESIS BI: CPT | Mod: TC

## 2017-10-31 PROCEDURE — 99213 OFFICE O/P EST LOW 20 MIN: CPT | Mod: 25 | Performed by: OBSTETRICS & GYNECOLOGY

## 2017-10-31 PROCEDURE — 36415 COLL VENOUS BLD VENIPUNCTURE: CPT | Performed by: OBSTETRICS & GYNECOLOGY

## 2017-10-31 PROCEDURE — 84443 ASSAY THYROID STIM HORMONE: CPT | Performed by: OBSTETRICS & GYNECOLOGY

## 2017-10-31 PROCEDURE — 87624 HPV HI-RISK TYP POOLED RSLT: CPT | Performed by: OBSTETRICS & GYNECOLOGY

## 2017-10-31 ASSESSMENT — ANXIETY QUESTIONNAIRES
6. BECOMING EASILY ANNOYED OR IRRITABLE: NOT AT ALL
IF YOU CHECKED OFF ANY PROBLEMS ON THIS QUESTIONNAIRE, HOW DIFFICULT HAVE THESE PROBLEMS MADE IT FOR YOU TO DO YOUR WORK, TAKE CARE OF THINGS AT HOME, OR GET ALONG WITH OTHER PEOPLE: VERY DIFFICULT
3. WORRYING TOO MUCH ABOUT DIFFERENT THINGS: SEVERAL DAYS
1. FEELING NERVOUS, ANXIOUS, OR ON EDGE: NEARLY EVERY DAY
GAD7 TOTAL SCORE: 7
7. FEELING AFRAID AS IF SOMETHING AWFUL MIGHT HAPPEN: NOT AT ALL
5. BEING SO RESTLESS THAT IT IS HARD TO SIT STILL: SEVERAL DAYS
2. NOT BEING ABLE TO STOP OR CONTROL WORRYING: SEVERAL DAYS

## 2017-10-31 ASSESSMENT — PATIENT HEALTH QUESTIONNAIRE - PHQ9
5. POOR APPETITE OR OVEREATING: SEVERAL DAYS
SUM OF ALL RESPONSES TO PHQ QUESTIONS 1-9: 18

## 2017-10-31 NOTE — PROGRESS NOTES
Taylor is a 52 year old  female who presents for annual exam.     Besides routine health maintenance,  she would like to discuss fatigue. Wants to have thyroid labs today. Gets medications through pcp.    HPI:  The patient's PCP is Ashley Chavez MD.    Patient has been feeling very tired  Is on thyroid medication for years so wonders if that is the cause  No other symptoms of low thyroid like serious constipation, hair loss, very dry skin        GYNECOLOGIC HISTORY:    No LMP recorded. Patient is postmenopausal.  Her current contraception method is: menopause.  She  reports that she has never smoked. She has never used smokeless tobacco.      Patient is sexually active.  STD testing offered?  Declined  Last PHQ-9 score on record =   PHQ-9 SCORE 10/31/2017   Total Score -   Total Score MyChart -   Total Score 18     Last GAD7 score on record =   GAVIN-7 SCORE 10/31/2017   Total Score -   Total Score 7     Alcohol Score = 2    HEALTH MAINTENANCE:  Cholesterol: 3/29/16   Total= 220, Triglycerides=91, HDL=78, KCD=850, FBS=95, TSH=16-0.15  Last Mammo: 16, Result: normal, Next Mammo: today   Pap: 12 neg, HPV-  Colonoscopy:  10/19/17, Result: polyp, Next Colonoscopy: 10 years.  Dexa:  Never    Health maintenance updated:  yes    HISTORY:  Obstetric History       T1      L1     SAB0   TAB0   Ectopic0   Multiple0   Live Births1       # Outcome Date GA Lbr Dion/2nd Weight Sex Delivery Anes PTL Lv   1 Term         CAROL ANN          Patient Active Problem List   Diagnosis     Hypothyroidism     Morbid obesity (H)     Rosacea     CARDIOVASCULAR SCREENING; LDL GOAL LESS THAN 130     Plantar fasciitis     Anxiety     Enthesopathy of ankle and tarsus     Snoring     GERD (gastroesophageal reflux disease)     Hypertension, goal below 140/90     Panic attacks     Vitamin D deficiency     Paresthesia     Hyperglycemia     Sleep disorder     Mild major depression (H)     Cystic acne     Other specified  hypothyroidism     Hyperlipidemia LDL goal <130     Hypertension     Hypothyroid     Other insomnia     Idiopathic neuropathy     Past Surgical History:   Procedure Laterality Date     BREAST SURGERY      breast reduction     COLONOSCOPY N/A 10/19/2017    Procedure: COMBINED COLONOSCOPY, SINGLE OR MULTIPLE BIOPSY/POLYPECTOMY BY BIOPSY;  colonoscopy;  Surgeon: Marquise Haskins MD;  Location:  GI     TONSILLECTOMY  1970      Social History   Substance Use Topics     Smoking status: Never Smoker     Smokeless tobacco: Never Used     Alcohol use 0.0 oz/week     0 Standard drinks or equivalent per week      Comment: 2 x week       Problem (# of Occurrences) Relation (Name,Age of Onset)    DIABETES (2) Sister, Maternal Grandmother    Hypertension (2) Father, Mother    Thyroid Disease (1) Father       Negative family history of: Breast Cancer, Cancer - colorectal, C.A.D.            Current Outpatient Prescriptions   Medication Sig     BuPROPion HCl (WELLBUTRIN PO)      levothyroxine (SYNTHROID/LEVOTHROID) 112 MCG tablet TAKE 1 TABLET (112 MCG) BY MOUTH DAILY     chlorthalidone (HYGROTON) 25 MG tablet TAKE 1/2 TABLET BY MOUTH DAILY     metroNIDAZOLE (METROCREAM) 0.75 % cream 1 application at night for rosacea.     citalopram (CELEXA) 20 MG tablet Take 1 tablet (20 mg) by mouth daily     fluocinolone acetonide 0.01 % OIL Place 5 drops in ear(s) 2 times daily as needed     Cholecalciferol (VITAMIN D) 1000 UNITS capsule Take 2 capsules by mouth daily     fish oil-omega-3 fatty acids (OMEGA 3) 1000 MG capsule Take 1 capsule by mouth daily.     No current facility-administered medications for this visit.      Allergies   Allergen Reactions     No Known Allergies        Past medical, surgical, social and family histories were reviewed and updated in EPIC.    ROS:   12 point review of systems negative other than symptoms noted below.  Constitutional: Fatigue and Weight Gain  Gastrointestinal: Diarrhea and Heartburn  Skin:  "Acne  Neurologic: Headaches and Tremors  Musculoskeletal: Muscular Weakness  Endocrine: Cold Intolerance, Decreased Libido and Excessive Thirst  Psychiatric: Anxiety, Depression and Difficulty Sleeping    EXAM:  /80  Pulse 68  Ht 1.626 m (5' 4\")  Wt 92.7 kg (204 lb 6.4 oz)  BMI 35.09 kg/m2   BMI: Body mass index is 35.09 kg/(m^2).    PHYSICAL EXAM:  Constitutional:  Appearance: Well nourished, well developed, alert, in no acute distress  Neck:  Lymph Nodes:  No lymphadenopathy present    Thyroid:  Gland size normal, nontender, no nodules or masses present  on palpation  Chest:  Respiratory Effort:  Breathing unlabored  Cardiovascular:    Heart: Auscultation:  Regular rate, normal rhythm, no murmurs present  Breasts: Inspection of Breasts:  No lymphadenopathy present., Palpation of Breasts and Axillae:  No masses present on palpation, no breast tenderness., Axillary Lymph Nodes:  No lymphadenopathy present. and No nodularity, asymmetry or nipple discharge bilaterally.  Gastrointestinal:   Abdominal Examination:  Abdomen nontender to palpation, tone normal without rigidity or guarding, no masses present, umbilicus without lesions   Liver and Spleen:  No hepatomegaly present, liver nontender to palpation    Hernias:  No hernias present  Lymphatic: Lymph Nodes:  No other lymphadenopathy present  Skin:  General Inspection:  No rashes present, no lesions present, no areas of  discoloration    Genitalia and Groin:  No rashes present, no lesions present, no areas of  discoloration, no masses present  Neurologic/Psychiatric:    Mental Status:  Oriented X3     Pelvic Exam:  External Genitalia:     Normal appearance for age, no discharge present, no tenderness present, no inflammatory lesions present, color normal  Vagina:     Normal vaginal vault without central or paravaginal defects, no discharge present, no inflammatory lesions present, no masses present  Bladder:     Nontender to palpation  Urethra:   Urethral " Body:  Urethra palpation normal, urethra structural support normal   Urethral Meatus:  No erythema or lesions present  Cervix:     Appearance healthy, no lesions present, nontender to palpation, no bleeding present  Uterus:     Uterus: firm, normal sized and nontender, anteverted in position.   Adnexa:     No adnexal tenderness present, no adnexal masses present  Perineum:     Perineum within normal limits, no evidence of trauma, no rashes or skin lesions present  Anus:     Anus within normal limits, no hemorrhoids present  Inguinal Lymph Nodes:     No lymphadenopathy present  Pubic Hair:     Normal pubic hair distribution for age  Genitalia and Groin:     No rashes present, no lesions present, no areas of discoloration, no masses present      COUNSELING:        thyroid testing, fatigue    BMI: Body mass index is 35.09 kg/(m^2).  Weight management plan: Patient was referred to their PCP to discuss a diet and exercise plan.    ASSESSMENT:  52 year old female with satisfactory annual exam.    ICD-10-CM    1. Encounter for gynecological examination without abnormal finding Z01.419 Pap imaged thin layer screen with HPV - recommended age 30 - 65     HPV High Risk Types DNA Cervical   2. Hypothyroidism, unspecified type E03.9 T4 Free     TSH   3. Encounter for vitamin deficiency screening Z13.21 Vitamin D Deficiency       PLAN:  We discussed fatigue and possible causes.  Thyroid labs and Vit D sent  Not bleeding so anemia not likely cause.  Pap and hpv q 5 y, done today  She will follow up the labs with her primary clinic  Mammogram today      Lauren Brown MD

## 2017-10-31 NOTE — MR AVS SNAPSHOT
After Visit Summary   10/31/2017    Taylor Baeza    MRN: [id number]           Patient Information     Date Of Birth          1965        Visit Information        Provider Department      10/31/2017 12:00 PM Lauren Brown MD [facility]        Today's Diagnoses     Encounter for gynecological examination without abnormal finding    -  1    Hypothyroidism, unspecified type        Encounter for vitamin deficiency screening           Follow-ups after your visit        Your next 10 appointments already scheduled     Dec 22, 2017   Procedure with Flaco Hernadez MD   [facility] (--)    [address]  [facility] [address] [phone]              Who to contact     If you have questions or need follow up information about today's clinic visit or your schedule please contact [facility] directly at [phone].  Normal or non-critical lab and imaging results will be communicated to you by MyChart, letter or phone within 4 business days after the clinic has received the results. If you do not hear from us within 7 days, please contact the clinic through [employer] or phone. If you have a critical or abnormal lab result, we will notify you by phone as soon as possible.  Submit refill requests through [employer] or call your pharmacy and they will forward the refill request to us. Please allow 3 business days for your refill to be completed.          Additional Information About Your Visit        MyChart Information     [employer] gives you secure access to your electronic health record. If you see a primary care provider, you can also send messages to your care team and make appointments. If you have questions, please call your primary care clinic.  If you do not have a primary care provider, please call [phone] and they will assist you.        Care EveryWhere ID     This is your Care EveryWhere ID. This could be used by  "other organizations to access your [place] medical records  [id number]        Your Vitals Were     Pulse Height BMI (Body Mass Index)             68 1.626 m (5' 4\") 35.09 kg/m2          Blood Pressure from Last 3 Encounters:   10/31/17 118/80   10/19/17 119/79   09/08/17 128/80    Weight from Last 3 Encounters:   10/31/17 92.7 kg (204 lb 6.4 oz)   10/19/17 95.3 kg (210 lb)   09/08/17 90.7 kg (200 lb)              We Performed the Following     HPV High Risk Types DNA Cervical     Pap imaged thin layer screen with HPV - recommended age 30 - 65     T4 Free     TSH     Vitamin D Deficiency        Primary Care Provider Office Phone # Fax #    Ashley Zackery Chavez [phone]       [address]  [place] [address]        Equal Access to Services     [facility]: Hadii aad ku hadasho Soomaali, waaxda luqadaha, qaybta kaalmada adeegyada, waxay talibin haygayle ornelasn . So [facility] [phone].    ATENCIÓN: Si habla español, tiene a rome disposición servicios gratuitos de asistencia lingüística. [facility] [phone].    We comply with applicable federal civil rights laws and [place] laws. We do not discriminate on the basis of race, color, national origin, age, disability, sex, sexual orientation, or gender identity.            Thank you!     Thank you for choosing [facility]  for your care. Our goal is always to provide you with excellent care. Hearing back from our patients is one way we can continue to improve our services. Please take a few minutes to complete the written survey that you may receive in the mail after your visit with us. Thank you!             Your Updated Medication List - Protect others around you: Learn how to safely use, store and throw away your medicines at www.disposemymeds.org.          This list is accurate as of: 10/31/17 12:56 PM.  Always use your most recent med list.                   Brand Name Dispense Instructions for use " Diagnosis    chlorthalidone 25 MG tablet    HYGROTON    45 tablet    TAKE 1/2 TABLET BY MOUTH DAILY    Hypertension, goal below 140/90       citalopram 20 MG tablet    celeXA    90 tablet    Take 1 tablet (20 mg) by mouth daily    Major depressive disorder, recurrent episode, mild (H)       fish oil-omega-3 fatty acids 1000 MG capsule      Take 1 capsule by mouth daily.        fluocinolone acetonide 0.01 % Oil     60 mL    Place 5 drops in ear(s) 2 times daily as needed    Otitis externa, unspecified laterality       levothyroxine 112 MCG tablet    SYNTHROID/LEVOTHROID    30 tablet    TAKE 1 TABLET (112 MCG) BY MOUTH DAILY    Hypothyroidism, unspecified type       metroNIDAZOLE 0.75 % cream    METROCREAM    100 g    1 application at night for rosacea.    Rosacea       vitamin D 1000 UNITS capsule      Take 2 capsules by mouth daily        WELLBUTRIN PO

## 2017-11-01 LAB
DEPRECATED CALCIDIOL+CALCIFEROL SERPL-MC: 48 UG/L (ref 20–75)
T4 FREE SERPL-MCNC: 0.87 NG/DL (ref 0.76–1.46)
TSH SERPL DL<=0.005 MIU/L-ACNC: 4.7 MU/L (ref 0.4–4)

## 2017-11-01 ASSESSMENT — ANXIETY QUESTIONNAIRES: GAD7 TOTAL SCORE: 7

## 2017-11-02 LAB
COPATH REPORT: NORMAL
PAP: NORMAL

## 2017-11-03 LAB
FINAL DIAGNOSIS: NORMAL
HPV HR 12 DNA CVX QL NAA+PROBE: NEGATIVE
HPV16 DNA SPEC QL NAA+PROBE: NEGATIVE
HPV18 DNA SPEC QL NAA+PROBE: NEGATIVE
SPECIMEN DESCRIPTION: NORMAL

## 2017-11-25 ENCOUNTER — MYC REFILL (OUTPATIENT)
Dept: FAMILY MEDICINE | Facility: CLINIC | Age: 52
End: 2017-11-25

## 2017-11-25 DIAGNOSIS — L71.9 ROSACEA: ICD-10-CM

## 2017-11-25 DIAGNOSIS — E03.9 HYPOTHYROIDISM, UNSPECIFIED TYPE: ICD-10-CM

## 2017-11-25 DIAGNOSIS — H69.90 DYSFUNCTION OF EUSTACHIAN TUBE, UNSPECIFIED LATERALITY: Primary | ICD-10-CM

## 2017-11-25 DIAGNOSIS — I10 HYPERTENSION, GOAL BELOW 140/90: ICD-10-CM

## 2017-11-25 DIAGNOSIS — F33.0 MAJOR DEPRESSIVE DISORDER, RECURRENT EPISODE, MILD (H): ICD-10-CM

## 2017-11-27 NOTE — TELEPHONE ENCOUNTER
Message from MyChart:  Original authorizing provider: FABI Landeros would like a refill of the following medications:  fluocinolone acetonide 0.01 % OIL [Carmen Ross PA-C]  chlorthalidone (HYGROTON) 25 MG tablet [Cramen Ross PA-C]  metroNIDAZOLE (METROCREAM) 0.75 % cream [Carmen Ross PA-C]  citalopram (CELEXA) 20 MG tablet [Carmen Ross PA-C]  levothyroxine (SYNTHROID/LEVOTHROID) 112 MCG tablet [Carmen Ross PA-C]    Preferred pharmacy: [id number], [place] - [address]    Comment:  I have recently been seen and had a thyroid test so that I can renew my prescription. Thanks

## 2017-11-27 NOTE — TELEPHONE ENCOUNTER
Fluocinolone oil      Last Written Prescription Date: 9/8/15  Last Fill Quantity: 60,  # refills: 5   Last Office Visit with [facility], [facility] or [facility] prescribing provider: 9/8/17                                             Requested Prescriptions   Pending Prescriptions Disp Refills     fluocinolone acetonide 0.01 % OIL 60 mL 5     Sig: Place 5 drops in ear(s) 2 times daily as needed    There is no refill protocol information for this order   Chlorthalidone      Last Written Prescription Date: 5/2/17  Last Fill Quantity: 45,  # refills: 1   Last Office Visit with [facility], [facility] or [facility] prescribing provider: 9/8/17                                                  chlorthalidone (HYGROTON) 25 MG tablet 45 tablet 1     Sig: TAKE 1/2 TABLET BY MOUTH DAILY    Diuretics (Including Combos) Protocol Failed    11/27/2017  7:17 AM       Failed - Recent or future visit with authorizing provider's specialty    Patient had office visit in the last year or has a visit in the next 30 days with authorizing provider.  See chart review.              Failed - Normal serum creatinine on file in past 12 months    Recent Labs   Lab Test  08/17/16   1602   CR  0.68             Failed - Normal serum potassium on file in past 12 months    Recent Labs   Lab Test  08/17/16   1602   POTASSIUM  3.7                   Failed - Normal serum sodium on file in past 12 months    Recent Labs   Lab Test  08/17/16   1602   NA  135             Passed - Blood pressure under 140/90    BP Readings from Last 3 Encounters:   10/31/17 118/80   10/19/17 119/79   09/08/17 128/80                Passed - Patient is age 18 or older       Passed - No active pregancy on record       Passed - No positive pregnancy test in past 12 months    metronidazole       Last Written Prescription Date: 5/2/17  Last Fill Quantity: 100,  # refills: 3   Last Office Visit with [facility], [facility] or [facility] prescribing provider: 9/8/17                                                  metroNIDAZOLE  (METROCREAM) 0.75 % cream 100 g 3     Si application at night for rosacea.    There is no refill protocol information for this order   Citalopram      Last Written Prescription Date: 17  Last Fill Quantity: 90,  # refills: 1   Last Office Visit with [facility], [facility] or [facility] prescribing provider: 17                                                  citalopram (CELEXA) 20 MG tablet 90 tablet 1     Sig: Take 1 tablet (20 mg) by mouth daily    SSRIs Protocol Failed    2017  7:17 AM       Failed - Recent (6 mo) or future visit with authorizing provider's specialty    Patient had office visit in the last 6 months or has a visit in the next 30 days with authorizing provider.  See chart review.            Passed - PHQ-9 score less than 5 in past 6 months    Please review PHQ-9 score.          Passed - Medication is NOT Bupropion    If the medication is Bupropion (Wellbutrin), and the patient is taking for smoking cessation; OK to refill.         Passed - Patient is age 18 or older       Passed - No active pregnancy on record       Passed - No positive pregnancy test in last 12 months   Levothyroxine      Last Written Prescription Date: 17  Last Fill Quantity: 30,  # refills: 0   Last Office Visit with [facility], [facility] or [facility] prescribing provider: 17                                                  levothyroxine (SYNTHROID/LEVOTHROID) 112 MCG tablet 30 tablet 0     Sig: TAKE 1 TABLET (112 MCG) BY MOUTH DAILY    Thyroid Protocol Failed    2017  7:17 AM       Failed - Recent or future visit with authorizing provider's specialty    Patient had office visit in the last year or has a visit in the next 30 days with authorizing provider.  See chart review.              Failed - Normal TSH on file in past 12 months    Recent Labs   Lab Test  10/31/17   1235   TSH  4.70*             Passed - Patient is 12 years or older       Passed - No active pregnancy on record    If patient is pregnant or has had a  positive pregnancy test, please check TSH.         Passed - No positive pregnancy test in past 12 months    If patient is pregnant or has had a positive pregnancy test, please check TSH.          PHQ-9 SCORE 4/20/2017 9/8/2017 10/31/2017   Total Score - - -   Total Score MyChart 8 (Mild depression) - -   Total Score - 1 18     No flowsheet data found.

## 2017-11-28 RX ORDER — LEVOTHYROXINE SODIUM 112 UG/1
TABLET ORAL
Qty: 30 TABLET | Refills: 0 | Status: SHIPPED | OUTPATIENT
Start: 2017-11-28 | End: 2017-12-12

## 2017-11-28 RX ORDER — FLUOCINOLONE ACETONIDE 0.11 MG/ML
5 OIL AURICULAR (OTIC) 2 TIMES DAILY PRN
Qty: 60 ML | Refills: 5 | Status: SHIPPED | OUTPATIENT
Start: 2017-11-28 | End: 2020-05-22

## 2017-11-28 RX ORDER — CITALOPRAM HYDROBROMIDE 20 MG/1
20 TABLET ORAL DAILY
Qty: 90 TABLET | Refills: 0 | Status: SHIPPED | OUTPATIENT
Start: 2017-11-28 | End: 2018-02-26

## 2017-11-28 RX ORDER — CHLORTHALIDONE 25 MG/1
TABLET ORAL
Qty: 15 TABLET | Refills: 0 | Status: SHIPPED | OUTPATIENT
Start: 2017-11-28 | End: 2017-12-25

## 2017-12-12 ENCOUNTER — OFFICE VISIT (OUTPATIENT)
Dept: FAMILY MEDICINE | Facility: CLINIC | Age: 52
End: 2017-12-12
Payer: COMMERCIAL

## 2017-12-12 VITALS
WEIGHT: 205 LBS | HEART RATE: 62 BPM | SYSTOLIC BLOOD PRESSURE: 112 MMHG | TEMPERATURE: 98.5 F | DIASTOLIC BLOOD PRESSURE: 80 MMHG | OXYGEN SATURATION: 96 % | HEIGHT: 64 IN | BODY MASS INDEX: 35 KG/M2

## 2017-12-12 DIAGNOSIS — R53.83 OTHER FATIGUE: ICD-10-CM

## 2017-12-12 DIAGNOSIS — E55.9 VITAMIN D DEFICIENCY: ICD-10-CM

## 2017-12-12 DIAGNOSIS — Z13.6 CARDIOVASCULAR SCREENING; LDL GOAL LESS THAN 160: ICD-10-CM

## 2017-12-12 DIAGNOSIS — E03.8 OTHER SPECIFIED HYPOTHYROIDISM: Primary | ICD-10-CM

## 2017-12-12 DIAGNOSIS — F32.0 MILD MAJOR DEPRESSION (H): ICD-10-CM

## 2017-12-12 DIAGNOSIS — G47.09 OTHER INSOMNIA: ICD-10-CM

## 2017-12-12 DIAGNOSIS — R73.9 HYPERGLYCEMIA: ICD-10-CM

## 2017-12-12 DIAGNOSIS — E03.9 HYPOTHYROIDISM, UNSPECIFIED TYPE: ICD-10-CM

## 2017-12-12 LAB
ERYTHROCYTE [DISTWIDTH] IN BLOOD BY AUTOMATED COUNT: 12.7 % (ref 10–15)
FOLATE SERPL-MCNC: 32.1 NG/ML
HBA1C MFR BLD: 5.2 % (ref 4.3–6)
HCT VFR BLD AUTO: 43.5 % (ref 35–47)
HGB BLD-MCNC: 14.5 G/DL (ref 11.7–15.7)
MCH RBC QN AUTO: 31.9 PG (ref 26.5–33)
MCHC RBC AUTO-ENTMCNC: 33.3 G/DL (ref 31.5–36.5)
MCV RBC AUTO: 96 FL (ref 78–100)
PLATELET # BLD AUTO: 286 10E9/L (ref 150–450)
RBC # BLD AUTO: 4.54 10E12/L (ref 3.8–5.2)
VIT B12 SERPL-MCNC: 655 PG/ML (ref 193–986)
WBC # BLD AUTO: 6.7 10E9/L (ref 4–11)

## 2017-12-12 PROCEDURE — 83036 HEMOGLOBIN GLYCOSYLATED A1C: CPT | Performed by: FAMILY MEDICINE

## 2017-12-12 PROCEDURE — 36415 COLL VENOUS BLD VENIPUNCTURE: CPT | Performed by: FAMILY MEDICINE

## 2017-12-12 PROCEDURE — 80053 COMPREHEN METABOLIC PANEL: CPT | Performed by: FAMILY MEDICINE

## 2017-12-12 PROCEDURE — 82607 VITAMIN B-12: CPT | Performed by: FAMILY MEDICINE

## 2017-12-12 PROCEDURE — 85027 COMPLETE CBC AUTOMATED: CPT | Performed by: FAMILY MEDICINE

## 2017-12-12 PROCEDURE — 99214 OFFICE O/P EST MOD 30 MIN: CPT | Performed by: FAMILY MEDICINE

## 2017-12-12 PROCEDURE — 83735 ASSAY OF MAGNESIUM: CPT | Performed by: FAMILY MEDICINE

## 2017-12-12 PROCEDURE — 82306 VITAMIN D 25 HYDROXY: CPT | Performed by: FAMILY MEDICINE

## 2017-12-12 PROCEDURE — 82746 ASSAY OF FOLIC ACID SERUM: CPT | Performed by: FAMILY MEDICINE

## 2017-12-12 PROCEDURE — 84443 ASSAY THYROID STIM HORMONE: CPT | Performed by: FAMILY MEDICINE

## 2017-12-12 RX ORDER — LEVOTHYROXINE SODIUM 112 UG/1
TABLET ORAL
Qty: 90 TABLET | Refills: 3 | Status: SHIPPED | OUTPATIENT
Start: 2017-12-12 | End: 2018-03-29

## 2017-12-12 RX ORDER — BUPROPION HYDROCHLORIDE 150 MG/1
150 TABLET ORAL EVERY MORNING
Qty: 30 TABLET | Refills: 1 | Status: SHIPPED | OUTPATIENT
Start: 2017-12-12 | End: 2018-03-29 | Stop reason: DRUGHIGH

## 2017-12-12 ASSESSMENT — ANXIETY QUESTIONNAIRES
3. WORRYING TOO MUCH ABOUT DIFFERENT THINGS: SEVERAL DAYS
1. FEELING NERVOUS, ANXIOUS, OR ON EDGE: NEARLY EVERY DAY
5. BEING SO RESTLESS THAT IT IS HARD TO SIT STILL: SEVERAL DAYS
6. BECOMING EASILY ANNOYED OR IRRITABLE: SEVERAL DAYS
7. FEELING AFRAID AS IF SOMETHING AWFUL MIGHT HAPPEN: NOT AT ALL
IF YOU CHECKED OFF ANY PROBLEMS ON THIS QUESTIONNAIRE, HOW DIFFICULT HAVE THESE PROBLEMS MADE IT FOR YOU TO DO YOUR WORK, TAKE CARE OF THINGS AT HOME, OR GET ALONG WITH OTHER PEOPLE: EXTREMELY DIFFICULT

## 2017-12-12 ASSESSMENT — PATIENT HEALTH QUESTIONNAIRE - PHQ9
SUM OF ALL RESPONSES TO PHQ QUESTIONS 1-9: 18
5. POOR APPETITE OR OVEREATING: SEVERAL DAYS

## 2017-12-12 NOTE — NURSING NOTE
"Chief Complaint   Patient presents with     Recheck Medication     Thyroid Problem       Initial /80  Pulse 62  Temp 98.5  F (36.9  C) (Tympanic)  Ht 5' 4\" (1.626 m)  Wt 205 lb (93 kg)  SpO2 96%  BMI 35.19 kg/m2 Estimated body mass index is 35.19 kg/(m^2) as calculated from the following:    Height as of this encounter: 5' 4\" (1.626 m).    Weight as of this encounter: 205 lb (93 kg).  Medication Reconciliation: complete  "

## 2017-12-12 NOTE — PATIENT INSTRUCTIONS
Mind-Body Therapy  Mind-body therapy is based on the belief that thoughts and physical health are closely connected. Your attitudes, beliefs, and outlook all can affect your physical health. And your physical health also can impact your mental and emotional well-being. By being aware of the connection and by learning new ways to relax, you can enhance your general health.    Uniting mind and body  Mind-body therapy is a way to improve the link between mental and physical health. By doing so, you may find untapped resources within yourself that may enhance your general health and mental outlook.  The power of suggestion is key to this type of therapy. A therapist may give suggestions that can help you better unite mind with body. Or, a biofeedback machine that uses sensory feedback as body functions change may give the suggestions.  The way you receive the suggestion matters less than what it teaches you about how to relax. A relaxed mind and body are key to this therapy. In fact, enhanced relaxation is often a main goal of therapy.  Questions for the mind-body therapist  Before you decide whether to have mind-body therapy, talk with at least one professional who practices it. Asking him or her some of these questions may help you make an informed choice:    What is your training? How long have you been practicing?    What results have you had working with people who have problems like mine?    What will a typical visit be like?    How long will treatment take? How much will it cost?  Common choices in mind-body therapy    Biofeedback: Sensory feedback is used to help control body function.    Guided imagery: Suggestion or thought is used to enhance awareness.    Hypnosis: Suggestion or relaxation is used to help influence mental state.    Meditation and prayer: Thought or spiritual belief is used to improve health.    Progressive relaxation: Focused awareness of the body is used to reduce stress.    Yoga:  "Movement, breathing, and thought are used to improve well-being.  Resources  Research mind-body therapy in the library, on the Internet, or by contacting:    Association for Applied Psychophysiology and Biofeedback  www.aapb.org    The Academy for Guided Imagery   acadgi.com    Center for Mind-Body Medicine   www.cmbm.org    American Society of Clinical Hypnosis  www.asch.net   Date Last Reviewed: 7/18/2015 2000-2017 [employer]. [address] [place] [address]. All rights reserved. This information is not intended as a substitute for professional medical care. Always follow your healthcare professional's instructions.        Stress Relief: Relaxation  Focusing the mind helps provide stress relief. Taking 5 to 10 minutes to practice relaxation each day helps you feel more refreshed. The following exercises can be done almost anywhere. Try one or more until you find what works best for you.  Calm your mind    Find a quiet place where you won't be disturbed. Then try the following:    Sit comfortably. Take off your shoes. Turn off your cell phone and pager. Take a few deep breaths.    Focus your mind on one peaceful thought, image, or word. Then try to hold that thought for 5 minutes.    When other thoughts enter your mind, relax and refocus. Let the invading thoughts fall away.    When you're done, stand up slowly and stretch your arms over your head. With practice, this exercise can help you feel restored.     Calm your body  With practice, you can use mental cues to tell your body how to feel.    Sit comfortably and clear your mind. A few deep breaths will help.    Mentally focus on your left hand and repeat to yourself, \"My left hand feels warm and heavy.\" Keep doing this until your hand does feel heavier and warmer.    Repeat the exercise using your right hand. Then focus on your arms, legs, and feet until your whole body feels relaxed.    When you're done, stand up slowly and stretch " your arms overhead.     Visualization  Visualization is like taking a mental vacation. It frees your mind while keeping your body in a calm state. To get started, picture yourself feeling warm and relaxed. Choose a peaceful setting that appeals to you and fill in the details. If you imagine a tropical beach, listen to the waves on the shore. Feel the sun on your face. Dig your toes in the sand. By using the power of your mind, you can take a soothing break when you need to.  Date Last Reviewed: 1/1/2017 2000-2017 [employer]. [address] [place] [id number]. All rights reserved. This information is not intended as a substitute for professional medical care. Always follow your healthcare professional's instructions.        Progressive Relaxation  Your body needs relaxation to reduce stress and undo the fight-or-flight response. It helps to plan for 20 minutes of relaxation every day. This is time for yourself. Sit or lie comfortably. Limit distractions like phones. Listen to soft music or just sit in silence. And try the relaxation technique below.    How to do progressive relaxation  Progressive relaxation helps your whole body relax. To try this technique, follow these steps:  1. Find a quiet room. Sit in a comfortable chair or lie on your back.  2. Breathe in deeply to a slow count of 5. Feel your belly, chest, and back expand. Breathe out slowly to a count of 5.  3. After a few minutes, breathe in deeply. Tighten the muscles in your feet. Notice how it feels. Hold the tension for 3 seconds.  4. Breathe out while relaxing the tightened muscles. Notice how relaxed you feel.  5. Repeat steps 3 and 4 with another muscle group. You can move from your feet, calves, and thighs to your stomach, arms, and hands.  Remember the 4 A s    Avoid a stressor. If someone is smoking when you re trying to quit, leave the room.    Accept a stressor you can t change, like a job loss, by knowing that your  feelings are normal.    Alter how you deal with a stressor. If a constantly ringing phone is a stressor, let the answering machine .    Adapt to some stressors. When starting a new exercise program, instead of focusing on how hard it will be, think how good you will feel.  Date Last Reviewed: 2/25/2016 2000-2017 [employer]. [address] [place] [address], [place] [address]. All rights reserved. This information is not intended as a substitute for professional medical care. Always follow your healthcare professional's instructions.          Treating Insomnia  Good sleeping habits are a key part of treatment. If needed, some medications may help you sleep better at first. Making healthy lifestyle changes and learning to relax can improve your sleep. Treating insomnia takes commitment, but trust that your efforts will pay off. Talk to your health care provider before taking any medication.    Healthy Lifestyle  Your lifestyle affects your health and your sleep. Here are some healthy habits:    Keep a regular sleep schedule. Go to bed and get up at the same time each day.    Exercise regularly. It may help you reduce stress. Avoid strenuous exercise for two to four hours before bedtime.    Avoid or limit naps.    Use your bed only for sleep and sex.    Don t spend too much time in bed trying to fall asleep. If you can t fall asleep, get up and do something until you become tired and drowsy.    Avoid or limit caffeine and nicotine. They can keep you awake at night. Also avoid alcohol. It may help you fall asleep at first, but your sleep will not be restful.  Before Bedtime  To sleep better every night, try these tips:    Have a bedtime routine to let your body and mind know when it s time to sleep.    Going to bed should be relaxing so try to do only relaxing things around bedtime. Sleep will come sooner.    If your worries don t let you sleep, write them down in a diary. Then close it, and go to  bed.    Make sure the room is not too hot or too cold. If it s not dark enough, an eye mask can help. If it s noisy, try using earplugs.  Learn to Relax  Stress, anxiety, and body tension may keep you awake at night. To unwind before bedtime, try reading a book, meditation, or yoga. Also, try the following:    Deep breathing. Sit or lie back in a chair. Take a slow, deep breath. Hold it for 5 counts. Then breathe out slowly through your mouth. Keep doing this until you feel relaxed.    Imagery. Think of the last fun trip you took. In your mind, walk through the trip from start to finish. Put as much detail into the memory as you can remember. It will help you relax.  Cognitive Behavioral Therapy (CBT)  CBT is the most effective treatment for long-term insomnia. It tries to address the underlying causes of your sleep problems, including your habits and how you think about sleep.   Individual Therapy  Carlyle Carrera, PhD  Insomnia   Home Sleep [facility]: [phone]    [facility]: [phone]  Group Therapy  Dates and times to be announced.  Online Programs    www.Sunshine (pronounced shut eye). There is a fee for this program. Enter the code  Home  if you decide to enroll in this program.     www.sleepIO.com (pronounced sleep ee oh). There is a fee for this program. Enter the code  Home  if you decide to enroll in this program.  Suggested Resources  Insomnia Treatment Books:    Overcoming Insomnia by Fuad Call and Janine Price (2008)    No More Sleepless Nights by Gary Fleming and Gely Kerns (1996)    Say Barbara to Insomnia by Ousmane Oviedo (2009)    The Insomnia Workbook by Yajaira Nieto and Ata Rodriguez (2009)    The Insomnia Answer by Chente Sin and Talib Hawley (2006)?  Stress Management and Relaxation Books:    The Relaxation and Stress Reduction Workbook by Paz Morales, Lucina Raza and Viktor  Andrew (2008)    Stress Management Workbook: Techniques and Self-Assessment Procedures by Pepper Maki and Ramirez Perla (1997)    A Mindfulness-Based Stress Reduction Workbook by Km Braden and Zohra Pulido (2010)    The Complete Stress Management Workbook by Haile Bliss, Ward Borden and Derrick Johnson (1996)    Assert Yourself by Che Davis and Jay Davis (1977)  Relaxation Resources for Computer Download   These websites offer resources to help you relax. This list is for information only. [facility] is not responsible for the quality of services or the actions of any person or organization  Progressive Muscle Relaxation (PMR):    http://www.[employer]/progressive-muscle-relaxation-exercise.html    http://studentsupport.[facility]/counseling/resources/self-help/relaxation-and-stress-management/  Deep Breathing Exercises:    http://www.[employer]/breathing-awareness.html  Meditation:       www[employer] Franchise    www.[employer] PropulsionguidedHCHB Cresseymeditation-site.[employer] You may have to pay for some of these resources.  Guided Imagery:    http://www.[employer]/guided-imagery-scripts.html    http://makexyz/library/vubdrjlhja-qmfxep-xzjagin/  Counseling / Behavioral Health  [facility]  Visit [email or web address].org or call [phone] to find a clinic close to you.   This is not a prescription and these resources are optional. You must pay for any costs when using these resources. Please ask your insurance carrier if you can be reimbursed for these resources. If so, you are responsible for sending the needed details to your insurance carrier. These resources may also be tax deductible as medical expenses. Check with your .  These programs and publications are not affiliated in any way with [facility].    2396-9302 [employer]. [address] [place] [address], [place] [address]. All rights reserved. This information is not  intended as a substitute for professional medical care. Always follow your healthcare professional's instructions.  This information has been modified by your health care provider with permission from the publisher.

## 2017-12-12 NOTE — MR AVS SNAPSHOT
After Visit Summary   12/12/2017    Taylor Baeza    MRN: [id number]           Patient Information     Date Of Birth          1965        Visit Information        Provider Department      12/12/2017 9:40 AM Ashley Chavez MD [facility]        Today's Diagnoses     Other specified hypothyroidism    -  1    Other fatigue        CARDIOVASCULAR SCREENING; LDL GOAL LESS THAN 130        Other insomnia        Vitamin D deficiency        Hyperglycemia        Mild major depression (H)        Hypothyroidism, unspecified type          Care Instructions      Mind-Body Therapy  Mind-body therapy is based on the belief that thoughts and physical health are closely connected. Your attitudes, beliefs, and outlook all can affect your physical health. And your physical health also can impact your mental and emotional well-being. By being aware of the connection and by learning new ways to relax, you can enhance your general health.    Uniting mind and body  Mind-body therapy is a way to improve the link between mental and physical health. By doing so, you may find untapped resources within yourself that may enhance your general health and mental outlook.  The power of suggestion is key to this type of therapy. A therapist may give suggestions that can help you better unite mind with body. Or, a biofeedback machine that uses sensory feedback as body functions change may give the suggestions.  The way you receive the suggestion matters less than what it teaches you about how to relax. A relaxed mind and body are key to this therapy. In fact, enhanced relaxation is often a main goal of therapy.  Questions for the mind-body therapist  Before you decide whether to have mind-body therapy, talk with at least one professional who practices it. Asking him or her some of these questions may help you make an informed choice:    What is your training? How long have you been practicing?    What  results have you had working with people who have problems like mine?    What will a typical visit be like?    How long will treatment take? How much will it cost?  Common choices in mind-body therapy    Biofeedback: Sensory feedback is used to help control body function.    Guided imagery: Suggestion or thought is used to enhance awareness.    Hypnosis: Suggestion or relaxation is used to help influence mental state.    Meditation and prayer: Thought or spiritual belief is used to improve health.    Progressive relaxation: Focused awareness of the body is used to reduce stress.    Yoga: Movement, breathing, and thought are used to improve well-being.  Resources  Research mind-body therapy in the library, on the Internet, or by contacting:    Association for Applied Psychophysiology and Biofeedback  www.aapb.org    The Academy for Guided Imagery   acadHZO.com    Center for Mind-Body Medicine   www.cm.org    American Society of Clinical Hypnosis  www.asch.net   Date Last Reviewed: 7/18/2015 2000-2017 [employer]. [address] [place] [address]. All rights reserved. This information is not intended as a substitute for professional medical care. Always follow your healthcare professional's instructions.        Stress Relief: Relaxation  Focusing the mind helps provide stress relief. Taking 5 to 10 minutes to practice relaxation each day helps you feel more refreshed. The following exercises can be done almost anywhere. Try one or more until you find what works best for you.  Calm your mind    Find a quiet place where you won't be disturbed. Then try the following:    Sit comfortably. Take off your shoes. Turn off your cell phone and pager. Take a few deep breaths.    Focus your mind on one peaceful thought, image, or word. Then try to hold that thought for 5 minutes.    When other thoughts enter your mind, relax and refocus. Let the invading thoughts fall away.    When you're done, stand up  "slowly and stretch your arms over your head. With practice, this exercise can help you feel restored.     Calm your body  With practice, you can use mental cues to tell your body how to feel.    Sit comfortably and clear your mind. A few deep breaths will help.    Mentally focus on your left hand and repeat to yourself, \"My left hand feels warm and heavy.\" Keep doing this until your hand does feel heavier and warmer.    Repeat the exercise using your right hand. Then focus on your arms, legs, and feet until your whole body feels relaxed.    When you're done, stand up slowly and stretch your arms overhead.     Visualization  Visualization is like taking a mental vacation. It frees your mind while keeping your body in a calm state. To get started, picture yourself feeling warm and relaxed. Choose a peaceful setting that appeals to you and fill in the details. If you imagine a tropical beach, listen to the waves on the shore. Feel the sun on your face. Dig your toes in the sand. By using the power of your mind, you can take a soothing break when you need to.  Date Last Reviewed: 1/1/2017 2000-2017 [employer]. [address] [place] [address], [place] [id number]. All rights reserved. This information is not intended as a substitute for professional medical care. Always follow your healthcare professional's instructions.        Progressive Relaxation  Your body needs relaxation to reduce stress and undo the fight-or-flight response. It helps to plan for 20 minutes of relaxation every day. This is time for yourself. Sit or lie comfortably. Limit distractions like phones. Listen to soft music or just sit in silence. And try the relaxation technique below.    How to do progressive relaxation  Progressive relaxation helps your whole body relax. To try this technique, follow these steps:  1. Find a quiet room. Sit in a comfortable chair or lie on your back.  2. Breathe in deeply to a slow count of 5. Feel your belly, " chest, and back expand. Breathe out slowly to a count of 5.  3. After a few minutes, breathe in deeply. Tighten the muscles in your feet. Notice how it feels. Hold the tension for 3 seconds.  4. Breathe out while relaxing the tightened muscles. Notice how relaxed you feel.  5. Repeat steps 3 and 4 with another muscle group. You can move from your feet, calves, and thighs to your stomach, arms, and hands.  Remember the 4 A s    Avoid a stressor. If someone is smoking when you re trying to quit, leave the room.    Accept a stressor you can t change, like a job loss, by knowing that your feelings are normal.    Alter how you deal with a stressor. If a constantly ringing phone is a stressor, let the answering machine .    Adapt to some stressors. When starting a new exercise program, instead of focusing on how hard it will be, think how good you will feel.  Date Last Reviewed: 2/25/2016 2000-2017 [employer]. [address] [place] [address]. All rights reserved. This information is not intended as a substitute for professional medical care. Always follow your healthcare professional's instructions.          Treating Insomnia  Good sleeping habits are a key part of treatment. If needed, some medications may help you sleep better at first. Making healthy lifestyle changes and learning to relax can improve your sleep. Treating insomnia takes commitment, but trust that your efforts will pay off. Talk to your health care provider before taking any medication.    Healthy Lifestyle  Your lifestyle affects your health and your sleep. Here are some healthy habits:    Keep a regular sleep schedule. Go to bed and get up at the same time each day.    Exercise regularly. It may help you reduce stress. Avoid strenuous exercise for two to four hours before bedtime.    Avoid or limit naps.    Use your bed only for sleep and sex.    Don t spend too much time in bed trying to fall asleep. If you can t  fall asleep, get up and do something until you become tired and drowsy.    Avoid or limit caffeine and nicotine. They can keep you awake at night. Also avoid alcohol. It may help you fall asleep at first, but your sleep will not be restful.  Before Bedtime  To sleep better every night, try these tips:    Have a bedtime routine to let your body and mind know when it s time to sleep.    Going to bed should be relaxing so try to do only relaxing things around bedtime. Sleep will come sooner.    If your worries don t let you sleep, write them down in a diary. Then close it, and go to bed.    Make sure the room is not too hot or too cold. If it s not dark enough, an eye mask can help. If it s noisy, try using earplugs.  Learn to Relax  Stress, anxiety, and body tension may keep you awake at night. To unwind before bedtime, try reading a book, meditation, or yoga. Also, try the following:    Deep breathing. Sit or lie back in a chair. Take a slow, deep breath. Hold it for 5 counts. Then breathe out slowly through your mouth. Keep doing this until you feel relaxed.    Imagery. Think of the last fun trip you took. In your mind, walk through the trip from start to finish. Put as much detail into the memory as you can remember. It will help you relax.  Cognitive Behavioral Therapy (CBT)  CBT is the most effective treatment for long-term insomnia. It tries to address the underlying causes of your sleep problems, including your habits and how you think about sleep.   Individual Therapy  Carlyle Carrera, PhD  Insomnia   [facility]: [phone]    [facility]: [phone]  Group Therapy  Dates and times to be announced.  Online Programs    www.SHUTi.me (pronounced shut eye). There is a fee for this program. Enter the code  [facility]  if you decide to enroll in this program.     www.sleepIO.com (pronounced sleep ee oh). There is a fee for this program. Enter the  code  [facility]  if you decide to enroll in this program.  Suggested Resources  Insomnia Treatment Books:    Overcoming Insomnia by Fuad Call and Janine Price (2008)    No More Sleepless Nights by Gary Fleming and Gely Kerns (1996)    Say Barbara to Insomnia by Ousmane Oviedo (2009)    The Insomnia Workbook by Yajaira Nieto and Ata Rodriguez (2009)    The Insomnia Answer by Chente Sin and Talib Hawley (2006)?  Stress Management and Relaxation Books:    The Relaxation and Stress Reduction Workbook by Paz Morales, Lucina Raza and Viktor Morales (2008)    Stress Management Workbook: Techniques and Self-Assessment Procedures by Pepper Maki and Ramirez Perla (1997)    A Mindfulness-Based Stress Reduction Workbook by Km Braden and Zohra Pulido (2010)    The Complete Stress Management Workbook by Ward Maria and Derrick Johnson (1996)    Assert Yourself by Che Davis and Jay Davsi (1977)  Relaxation Resources for Computer Download   These websites offer resources to help you relax. This list is for information only. [facility] is not responsible for the quality of services or the actions of any person or organization  Progressive Muscle Relaxation (PMR):    http://www.[employer]/progressive-muscle-relaxation-exercise.html    http://studentsupport.[facility]/counseling/resources/self-help/relaxation-and-stress-management/  Deep Breathing Exercises:    http://www.[employer]/breathing-awareness.html  Meditation:       www.DekkorantheAptDeco    www.theDelphinus Medical TechnologiesguidedDelphinus Medical Technologiesmeditation-site.com You may have to pay for some of these resources.  Guided Imagery:    http://www.[employer]/guided-imagery-scripts.html    http://[employer]/library/fqryuhbxpl-mlfpsh-bwzendi/  Counseling / Behavioral Health  [facility]  Visit www.[email or web address].org or call [phone] to find a clinic close to  you.   This is not a prescription and these resources are optional. You must pay for any costs when using these resources. Please ask your insurance carrier if you can be reimbursed for these resources. If so, you are responsible for sending the needed details to your insurance carrier. These resources may also be tax deductible as medical expenses. Check with your .  These programs and publications are not affiliated in any way with [place].    4122-6963 [employer]. [address] [place] [address], [place] [address]. All rights reserved. This information is not intended as a substitute for professional medical care. Always follow your healthcare professional's instructions.  This information has been modified by your health care provider with permission from the publisher.                Follow-ups after your visit        Who to contact     If you have questions or need follow up information about today's clinic visit or your schedule please contact [facility] [place] directly at [phone].  Normal or non-critical lab and imaging results will be communicated to you by MyChart, letter or phone within 4 business days after the clinic has received the results. If you do not hear from us within 7 days, please contact the clinic through Bizporahart or phone. If you have a critical or abnormal lab result, we will notify you by phone as soon as possible.  Submit refill requests through [employer] or call your pharmacy and they will forward the refill request to us. Please allow 3 business days for your refill to be completed.          Additional Information About Your Visit        [employer]por[employer]ar[facility] Voyage Information     [employer] gives you secure access to your electronic health record. If you see a primary care provider, you can also send messages to your care team and make appointments. If you have questions, please call your primary care clinic.  If you do not have a primary care provider, please call  "[phone] and they will assist you.        Care EveryWhere ID     This is your Care EveryWhere ID. This could be used by other organizations to access your [facility] medical records  [id number]        Your Vitals Were     Pulse Temperature Height Pulse Oximetry BMI (Body Mass Index)       62 98.5  F (36.9  C) (Tympanic) 5' 4\" (1.626 m) 96% 35.19 kg/m2        Blood Pressure from Last 3 Encounters:   12/12/17 112/80   10/31/17 118/80   10/19/17 119/79    Weight from Last 3 Encounters:   12/12/17 205 lb (93 kg)   10/31/17 204 lb 6.4 oz (92.7 kg)   10/19/17 210 lb (95.3 kg)              We Performed the Following     CBC with platelets     Comprehensive metabolic panel     Folate     Hemoglobin A1c     Magnesium     TSH with free T4 reflex     Vitamin B12     Vitamin D Deficiency          Today's Medication Changes          These changes are accurate as of: 12/12/17 10:39 AM.  If you have any questions, ask your nurse or doctor.               Start taking these medicines.        Dose/Directions    buPROPion 150 MG 24 hr tablet   Commonly known as:  WELLBUTRIN XL   Used for:  Other fatigue, Mild major depression (H)   Started by:  Ashley Chavez MD        Dose:  150 mg   Take 1 tablet (150 mg) by mouth every morning   Quantity:  30 tablet   Refills:  1            Where to get your medicines      These medications were sent to [id number] - [place], [place] - [address]  [address] [address]     Phone:  [phone]     buPROPion 150 MG 24 hr tablet    levothyroxine 112 MCG tablet                Primary Care Provider Office Phone # Fax #    Ashley Chavez [phone]       [address]  [place] [address]        Equal Access to Services     [facility] BO : Annabel Paredes, waaxda luqadaha, qaybta kaalmada naomie, kim dao. So [facility] [phone].    ATENCIÓN: Si habla español, tiene a rome disposición " servicios gratuitos de asistencia lingüística. Emory villegas [phone].    We comply with applicable federal civil rights laws and [place] laws. We do not discriminate on the basis of race, color, national origin, age, disability, sex, sexual orientation, or gender identity.            Thank you!     Thank you for choosing [facility] [place]  for your care. Our goal is always to provide you with excellent care. Hearing back from our patients is one way we can continue to improve our services. Please take a few minutes to complete the written survey that you may receive in the mail after your visit with us. Thank you!             Your Updated Medication List - Protect others around you: Learn how to safely use, store and throw away your medicines at www.disposemymeds.org.          This list is accurate as of: 12/12/17 10:39 AM.  Always use your most recent med list.                   Brand Name Dispense Instructions for use Diagnosis    buPROPion 150 MG 24 hr tablet    WELLBUTRIN XL    30 tablet    Take 1 tablet (150 mg) by mouth every morning    Other fatigue, Mild major depression (H)       chlorthalidone 25 MG tablet    HYGROTON    15 tablet    TAKE 1/2 TABLET BY MOUTH DAILY    Hypertension, goal below 140/90       citalopram 20 MG tablet    celeXA    90 tablet    Take 1 tablet (20 mg) by mouth daily    Major depressive disorder, recurrent episode, mild (H)       fish oil-omega-3 fatty acids 1000 MG capsule      Take 1 capsule by mouth daily.        fluocinolone acetonide 0.01 % Oil     60 mL    Place 5 drops in ear(s) 2 times daily as needed    Dysfunction of Eustachian tube, unspecified laterality       levothyroxine 112 MCG tablet    SYNTHROID/LEVOTHROID    90 tablet    TAKE 1 TABLET (112 MCG) BY MOUTH DAILY    Hypothyroidism, unspecified type       metroNIDAZOLE 0.75 % cream    METROCREAM    100 g    1 application at night for rosacea.    Rosacea       vitamin D 1000 UNITS capsule      Take 2  capsules by mouth daily

## 2017-12-12 NOTE — PROGRESS NOTES
SUBJECTIVE:   Taylor Baeza is a 52 year old female who presents to clinic today for the following health issues:      Medication Followup of Levothyroxine     Taking Medication as prescribed: yes    Side Effects:  None    Medication Helping Symptoms:  yes     Hyperlipidemia Follow-Up      Rate your low fat/cholesterol diet?: poor    Taking statin?  No    Other lipid medications/supplements?:  none    Hypertension Follow-up      Outpatient blood pressures are being checked at home.  Results are 126-98,124/89.    Low Salt Diet: not monitoring salt    Hypothyroidism Follow-up/ - Fatigue       Since last visit, patient describes the following symptoms: her TSH was high, so need a follow up check has been  on same dose for a while still feeling very tired .     has been told that she may have fibromyalgia  etc. Admits feeling lack of motivation not sleeping too well ,takes naps  in evening and wakes up a lot at night ,told feels tired next morning    Weight stable, no hair loss, no skin changes, no constipation, no loose stools, dry skin, constipation, loose stools, tremors and fatigue  Decline in energy/fatigue past 6 months   Swelling under the eyes. No leg edema. No exertional chest pain sob etc.   Frequent skin breakouts/acne/derm problem   Shakiness/Sweating/chills . No fever or chills        Amount of exercise or physical activity: None    Problems taking medications regularly: No    Medication side effects: none  Diet: regular (no restrictions)    PROBLEMS TO ADD ON...  Depression and Anxiety Follow-Up    Status since last visit: same, not sleeping well , has bee on Wellbutrin previously and she thinks  she slept better on it. Willing to try  it again     Other associated symptoms:See phq-9 and yossi 7    Complicating factors: none     Significant life event: No     Current substance abuse: None    PHQ-9 Score and MyChart F/U Questions 9/8/2017 10/31/2017 12/12/2017   Total Score 1 18 18   Q9: Suicide Ideation  Not at all Not at all Not at all     GAVIN-7 SCORE 8/26/2016 9/8/2017 10/31/2017   Total Score - - -   Total Score 4 5 7       PHQ-9  English  PHQ-9   Any Language  GAD7  Suicide Assessment Five-step Evaluation and Treatment (SAFE-T)        Problem list and histories reviewed & adjusted, as indicated.  Additional history: as documented    Patient Active Problem List   Diagnosis     Hypothyroidism     Morbid obesity (H)     Rosacea     CARDIOVASCULAR SCREENING; LDL GOAL LESS THAN 130     Plantar fasciitis     Anxiety     Enthesopathy of ankle and tarsus     Snoring     GERD (gastroesophageal reflux disease)     Hypertension, goal below 140/90     Panic attacks     Vitamin D deficiency     Paresthesia     Hyperglycemia     Sleep disorder     Mild major depression (H)     Cystic acne     Other specified hypothyroidism     Hyperlipidemia LDL goal <130     Hypertension     Hypothyroid     Other insomnia     Idiopathic neuropathy     Past Surgical History:   Procedure Laterality Date     BREAST SURGERY      breast reduction     COLONOSCOPY N/A 10/19/2017    Procedure: COMBINED COLONOSCOPY, SINGLE OR MULTIPLE BIOPSY/POLYPECTOMY BY BIOPSY;  colonoscopy;  Surgeon: Marquise Haskins MD;  Location:  GI     TONSILLECTOMY  1970       Social History   Substance Use Topics     Smoking status: Never Smoker     Smokeless tobacco: Never Used     Alcohol use 0.0 oz/week     0 Standard drinks or equivalent per week      Comment: 2 x week      Family History   Problem Relation Age of Onset     DIABETES Sister      Thyroid Disease Father      Hypertension Father      Hypertension Mother      DIABETES Maternal Grandmother      Breast Cancer No family hx of      Cancer - colorectal No family hx of      C.A.D. No family hx of              Reviewed and updated as needed this visit by clinical staff     Reviewed and updated as needed this visit by Provider         ROS:  Constitutional, HEENT, cardiovascular, pulmonary, GI, ,  "musculoskeletal, neuro, skin, endocrine and psych systems are negative, except as otherwise noted.      OBJECTIVE:   /80  Pulse 62  Temp 98.5  F (36.9  C) (Tympanic)  Ht 5' 4\" (1.626 m)  Wt 205 lb (93 kg)  SpO2 96%  BMI 35.19 kg/m2  Body mass index is 35.19 kg/(m^2).  GENERAL: healthy, alert and no distress  NECK: no adenopathy, no asymmetry, masses, or scars and thyroid normal to palpation  RESP: lungs clear to auscultation - no rales, rhonchi or wheezes  CV: regular rate and rhythm, normal S1 S2, no S3 or S4, no murmur, click or rub, no peripheral edema and peripheral pulses strong  ABDOMEN: soft, nontender, no hepatosplenomegaly, no masses and bowel sounds normal  MS: no edema  SKIN: no suspicious lesions or rashes  NEURO: Normal strength and tone, mentation intact and speech normal  PSYCH: mentation appears normal, affect flat, fatigued, speech pressured, judgement and insight intact and appearance well groomed        ASSESSMENT/PLAN:         (E03.8) Other specified hypothyroidism  (primary encounter diagnosis)  Comment:   Plan:     (R53.83) Other fatigue  Comment:   Plan: CBC with platelets, TSH with free T4 reflex,         Vitamin D Deficiency, Comprehensive metabolic         panel, Hemoglobin A1c, Vitamin B12, Folate,         buPROPion (WELLBUTRIN XL) 150 MG 24 hr tablet,         Magnesium            (Z13.6) CARDIOVASCULAR SCREENING; LDL GOAL LESS THAN 130  Comment:   Plan:     (G47.09) Other insomnia  Comment:   Plan:     (E55.9) Vitamin D deficiency  Comment:   Plan: Vitamin D Deficiency            (R73.9) Hyperglycemia  Comment:   Plan: Hemoglobin A1c            (F32.0) Mild major depression (H)  Comment:   Plan: buPROPion (WELLBUTRIN XL) 150 MG 24 hr tablet        Willing to add wellbutrin     (E03.9) Hypothyroidism, unspecified type  Comment:   Plan: levothyroxine (SYNTHROID/LEVOTHROID) 112 MCG         tablet          Check labs  Cares and symptomatic treatment discussed follow up if ongoing " problem or concerns        Patient expressed understanding and agreement with treatment plan. All patient's questions were answered, will let me know if has more later.  Medications: Rx's: Reviewed the potential side effects/complications of medications prescribed.       Ashley Chavez MD  [facility]

## 2017-12-13 LAB
ALBUMIN SERPL-MCNC: 4.1 G/DL (ref 3.4–5)
ALP SERPL-CCNC: 71 U/L (ref 40–150)
ALT SERPL W P-5'-P-CCNC: 36 U/L (ref 0–50)
ANION GAP SERPL CALCULATED.3IONS-SCNC: 7 MMOL/L (ref 3–14)
AST SERPL W P-5'-P-CCNC: 25 U/L (ref 0–45)
BILIRUB SERPL-MCNC: 0.5 MG/DL (ref 0.2–1.3)
BUN SERPL-MCNC: 17 MG/DL (ref 7–30)
CALCIUM SERPL-MCNC: 9 MG/DL (ref 8.5–10.1)
CHLORIDE SERPL-SCNC: 104 MMOL/L (ref 94–109)
CO2 SERPL-SCNC: 29 MMOL/L (ref 20–32)
CREAT SERPL-MCNC: 0.7 MG/DL (ref 0.52–1.04)
DEPRECATED CALCIDIOL+CALCIFEROL SERPL-MC: 43 UG/L (ref 20–75)
GFR SERPL CREATININE-BSD FRML MDRD: 87 ML/MIN/1.7M2
GLUCOSE SERPL-MCNC: 92 MG/DL (ref 70–99)
MAGNESIUM SERPL-MCNC: 2 MG/DL (ref 1.6–2.3)
POTASSIUM SERPL-SCNC: 3.9 MMOL/L (ref 3.4–5.3)
PROT SERPL-MCNC: 7.7 G/DL (ref 6.8–8.8)
SODIUM SERPL-SCNC: 140 MMOL/L (ref 133–144)
TSH SERPL DL<=0.005 MIU/L-ACNC: 1.86 MU/L (ref 0.4–4)

## 2017-12-25 DIAGNOSIS — I10 HYPERTENSION, GOAL BELOW 140/90: ICD-10-CM

## 2017-12-26 RX ORDER — CHLORTHALIDONE 25 MG/1
TABLET ORAL
Qty: 90 TABLET | Refills: 1 | Status: SHIPPED | OUTPATIENT
Start: 2017-12-26 | End: 2019-02-01

## 2017-12-26 NOTE — TELEPHONE ENCOUNTER
Requested Prescriptions   Pending Prescriptions Disp Refills     chlorthalidone (HYGROTON) 25 MG tablet [Pharmacy Med Name: Chlorthalidone Oral Tablet 25 MG]  Last Written Prescription Date:  11/28/2017  Last Fill Quantity: 15,  # refills: 0   Last Office Visit with [facility], [facility] or [facility] prescribing provider:  12/12/2017   Future Office Visit:      15 tablet 0     Sig: TAKE 1/2 TABLET BY MOUTH DAILY    Diuretics (Including Combos) Protocol Passed    12/25/2017  7:00 AM       Passed - Blood pressure under 140/90    BP Readings from Last 3 Encounters:   12/12/17 112/80   10/31/17 118/80   10/19/17 119/79                Passed - Recent or future visit with authorizing provider's specialty    Patient had office visit in the last year or has a visit in the next 30 days with authorizing provider.  See chart review.              Passed - Patient is age 18 or older       Passed - No active pregancy on record       Passed - Normal serum creatinine on file in past 12 months    Recent Labs   Lab Test  12/12/17   1041   CR  0.70             Passed - Normal serum potassium on file in past 12 months    Recent Labs   Lab Test  12/12/17   1041   POTASSIUM  3.9                   Passed - Normal serum sodium on file in past 12 months    Recent Labs   Lab Test  12/12/17   1041   NA  140             Passed - No positive pregnancy test in past 12 months

## 2018-02-26 ENCOUNTER — TELEPHONE (OUTPATIENT)
Dept: FAMILY MEDICINE | Facility: CLINIC | Age: 53
End: 2018-02-26

## 2018-02-26 DIAGNOSIS — F33.0 MAJOR DEPRESSIVE DISORDER, RECURRENT EPISODE, MILD (H): ICD-10-CM

## 2018-02-26 NOTE — TELEPHONE ENCOUNTER
"Requested Prescriptions   Pending Prescriptions Disp Refills     citalopram (CELEXA) 20 MG tablet  Last Written Prescription Date:  11-  Last Fill Quantity: 90 tablet,  # refills: 0   Last office visit: 12/12/2017 with prescribing provider:  12-  Future Office Visit:     90 tablet 0     Sig: Take 1 tablet (20 mg) by mouth daily    SSRIs Protocol Failed    2/26/2018  8:29 AM       Failed - No positive pregnancy test in last 12 months       Passed - PHQ-9 score less than 5 in past 6 months    The PHQ-9 criteria is meant to fail. It requires a PHQ-9 score review         Passed - Patient is age 18 or older       Passed - No active pregnancy on record       Passed - Recent (6 mo) or future visit with authorizing provider's specialty    Patient had office visit in the last 6 months or has a visit in the next 30 days with authorizing provider.  See \"Patient Info\" tab in inbasket, or \"Choose Columns\" in Meds & Orders section of the refill encounter.              "

## 2018-02-27 RX ORDER — CITALOPRAM HYDROBROMIDE 20 MG/1
20 TABLET ORAL DAILY
Qty: 90 TABLET | Refills: 0 | Status: SHIPPED | OUTPATIENT
Start: 2018-02-27 | End: 2018-03-29

## 2018-02-27 NOTE — TELEPHONE ENCOUNTER
PHQ-9 SCORE 9/8/2017 10/31/2017 12/12/2017   Total Score - - -   Total Score MyChart - - -   Total Score 1 18 18     Routing refill request to provider for review/approval because:  PHQ-9 > [facility] protocol for RN rosalba Mulligan RN - Triage  [facility]

## 2018-02-28 NOTE — TELEPHONE ENCOUNTER
Script refill faxed. Remind pt to do follow up for med check  since she is due (  Last PH 9 was out of range and also Wellbutrin was added to help)

## 2018-03-29 ENCOUNTER — OFFICE VISIT (OUTPATIENT)
Dept: FAMILY MEDICINE | Facility: CLINIC | Age: 53
End: 2018-03-29
Payer: COMMERCIAL

## 2018-03-29 VITALS
TEMPERATURE: 98.6 F | DIASTOLIC BLOOD PRESSURE: 79 MMHG | HEART RATE: 71 BPM | WEIGHT: 203 LBS | BODY MASS INDEX: 34.66 KG/M2 | SYSTOLIC BLOOD PRESSURE: 113 MMHG | OXYGEN SATURATION: 98 % | HEIGHT: 64 IN

## 2018-03-29 DIAGNOSIS — F32.0 MILD MAJOR DEPRESSION (H): ICD-10-CM

## 2018-03-29 DIAGNOSIS — E03.9 HYPOTHYROIDISM, UNSPECIFIED TYPE: ICD-10-CM

## 2018-03-29 DIAGNOSIS — F33.0 MAJOR DEPRESSIVE DISORDER, RECURRENT EPISODE, MILD (H): ICD-10-CM

## 2018-03-29 PROCEDURE — 99214 OFFICE O/P EST MOD 30 MIN: CPT | Performed by: FAMILY MEDICINE

## 2018-03-29 RX ORDER — LEVOTHYROXINE SODIUM 112 UG/1
TABLET ORAL
Qty: 90 TABLET | Refills: 2 | Status: SHIPPED | OUTPATIENT
Start: 2018-03-29 | End: 2019-06-19

## 2018-03-29 RX ORDER — BUPROPION HYDROCHLORIDE 300 MG/1
300 TABLET ORAL EVERY MORNING
Qty: 90 TABLET | Refills: 1 | Status: SHIPPED | OUTPATIENT
Start: 2018-03-29 | End: 2018-09-20

## 2018-03-29 RX ORDER — CITALOPRAM HYDROBROMIDE 20 MG/1
20 TABLET ORAL DAILY
Qty: 90 TABLET | Refills: 1 | Status: SHIPPED | OUTPATIENT
Start: 2018-03-29 | End: 2018-09-20

## 2018-03-29 ASSESSMENT — ANXIETY QUESTIONNAIRES
1. FEELING NERVOUS, ANXIOUS, OR ON EDGE: NOT AT ALL
5. BEING SO RESTLESS THAT IT IS HARD TO SIT STILL: NOT AT ALL
7. FEELING AFRAID AS IF SOMETHING AWFUL MIGHT HAPPEN: NOT AT ALL
GAD7 TOTAL SCORE: 0
2. NOT BEING ABLE TO STOP OR CONTROL WORRYING: NOT AT ALL
3. WORRYING TOO MUCH ABOUT DIFFERENT THINGS: NOT AT ALL
6. BECOMING EASILY ANNOYED OR IRRITABLE: NOT AT ALL
IF YOU CHECKED OFF ANY PROBLEMS ON THIS QUESTIONNAIRE, HOW DIFFICULT HAVE THESE PROBLEMS MADE IT FOR YOU TO DO YOUR WORK, TAKE CARE OF THINGS AT HOME, OR GET ALONG WITH OTHER PEOPLE: NOT DIFFICULT AT ALL

## 2018-03-29 ASSESSMENT — PATIENT HEALTH QUESTIONNAIRE - PHQ9: 5. POOR APPETITE OR OVEREATING: NOT AT ALL

## 2018-03-29 NOTE — MR AVS SNAPSHOT
After Visit Summary   3/29/2018    Taylor Baeza    MRN: [id number]           Patient Information     Date Of Birth          1965        Visit Information        Provider Department      3/29/2018 1:00 PM Ashley Chavez MD [facility]        Today's Diagnoses     BMI 34.0-34.9,adult    -  1    Mild major depression (H)        Major depressive disorder, recurrent episode, mild (H)        Hypothyroidism, unspecified type          Care Instructions      Weight Management: Fact and Fiction    Knowing the truth about losing weight can help you separate what works from what doesn t. Don t be taken in by expensive weight-loss fads like pills, herbs, and special foods that promise unbelievable results. There s no magic way to lose weight. If you have questions about weight loss, ask your healthcare provider.  Fiction:  The faster I lose weight, the better.   Fact: Rapid weight loss is usually due to loss of water or muscle mass. What you re trying to get rid of is extra fat. Aim to lose a 1/2 pound to 2 pounds a week. Then you re more likely to lose fat rather than water or muscle.  Fiction:  Skipping meals will help me lose weight.   Fact: When you skip meals, you don t give your body the energy it needs to work. Hunger makes you more likely to overeat later on. It s best to spread your meals throughout the day. Eat at least three meals a day.  Fiction:  I can t start exercising until I lose weight.   Fact: The sooner you start exercising the better. Exercise helps burn more calories, tone your muscles, and keep your appetite in check. People who continue to exercise after they lose weight are more likely to keep the weight off.  Fiction:  The fewer calories I eat, the better.   Fact: This seems like it should be true, but it s not. When you eat too few calories, your body acts as if it s on a desert island. It thinks food is scarce, so it slows down your metabolism (how fast  you burn calories) to save energy. By eating too few calories, you make it harder to lose weight.  Fiction:  Once I lose weight, I can go back to living the way I did before.   Fact: Going back to your old eating habits and giving up exercise is a sure way to regain any weight you ve lost. The lifestyle changes that help you lose extra weight can also help keep it off. This is why you need to make realistic changes you can stick with.  Fiction:  Low-fat and fat-free mean low-calorie.   Fact: All foods, even fat-free ones, have calories. Eat too many calories and you ll gain weight. It s OK to treat yourself to a fat-free cookie or two. Just don t eat the whole box! A dietitian will help you figure this out, and will likely recommend that you eat three meals a day, with protein with each meal.   Date Last Reviewed: 2/4/2016 2000-2017 [employer]. [address] [place] [address], [place] [address]. All rights reserved. This information is not intended as a substitute for professional medical care. Always follow your healthcare professional's instructions.        Weight Management: Healthy Eating  Food is your body s fuel. You can t live without it. The key is to give your body enough nutrients and energy without eating too much. Reading food labels can help you make healthy choices. Also, learn new eating habits to manage your weight.     All the values on the label are based on one serving. The serving size is the average portion. Remember to multiply the values on the label by the number of servings you eat.   Eat less fat  A gram of fat has almost 2.5 times the calories of a gram of protein or carbohydrates. Try to balance your food choices so that only 20% to 35% of your calories comes from total fat. This means an average of 2  to 3  grams of fat for each 100 calories you eat.  Eat more fiber  High-fiber foods are digested more slowly than low-fiber foods, so you feel full longer. Try to get at least 25  grams of fiber each day for a 2000 calorie diet. Foods high in fiber include:    Vegetables and fruits    Whole-grain or bran breads, pastas, and cereals    Legumes (beans) and peas  As you begin to eat more fiber, be sure to drink plenty of water to keep your digestive system working smoothly.  Tips  Do's and don'ts include:     Don t skip meals. This often leads to overeating later on. It s best to spread your eating throughout the day.    Eat a variety of foods, not just a few favorites.    If you find yourself eating when you re not hungry, ask yourself why. Many of us eat when we re bored, stressed, or just to be polite. Listen to your body. If you re not hungry, get busy doing something else instead of eating.    Eat slower, shooting for 20 to 30 minutes for each meal. It takes 20 minutes for your stomach to tell your brain that it s full. Slow eaters tend to eat less and are still satisfied, while fast eaters may tend to be overeaters.     Pay attention to what you eat. Don t read or watch TV during your meal.  Date Last Reviewed: 1/31/2016 2000-2017 [employer]. [address] [place] [address], [place] [address]. All rights reserved. This information is not intended as a substitute for professional medical care. Always follow your healthcare professional's instructions.        Losing Weight for Heart Health  Excess weight is a major risk factor for heart disease. Losing weight has many benefits including lowering your blood pressure, improving your cholesterol level, and decreasing your risk for diseases such as diabetes and heart disease. It may help keep your arteries open so that your heart can get the oxygen-rich blood it needs. All in all, losing weight makes you healthier.     Exercise with a friend. When activity is fun, you're more likely to stick with it.   Calories and weight loss    Calories are the fuel your body burns for energy. You get the calories you need from the food you eat. For  healthy weight loss, women should eat at least 1,200 calories a day, men at least 1,500.    When you eat more calories than you need, your body stores the extra calories as fat. One pound of fat equals 3,500 calories.    To lose weight, try to reduce your total calorie intake by 500 calories. To do this, eat 250 calories less each day. Add activity to burn the other 250 calories. Walking 2.5 miles burns about 250 calories. Other more intense activities can burn more calories in the time you spend doing them, such as swimming and running. It is important to understand that reducing calorie intake is much more effective at weight loss than is exercise.    Eat a variety of healthy foods to get the nutrients you need.  Tips for losing weight    Drink 8 to 10 glasses of water a day.    Don t skip meals. Instead, eat smaller portions.    Eat your meals earlier in the day.    Cut out sugary drinks such as soda and fruit juices.    Make your later meals lighter than your earlier meals.   Brisk activity is best  Brisk activity gets your heart pumping faster and it makes it healthier. It s also a great way to burn calories. In fact, your body may keep burning calories for hours after you stop a brisk activity:    Begin by walking 10 minutes most days.    Add more time and speed to your walk. Build up as you feel able.    Aim for 3 to 4 sessions of aerobic exercise a week. Each session should last about 40 minutes and include moderate to vigorous physical activity.    The most important part of the activity is that you break a sweat. This indicates your heart is working hard enough to burn fat.  Date Last Reviewed: 6/1/2016 2000-2017 [employer]. [address] [place] [address], [place] [address]. All rights reserved. This information is not intended as a substitute for professional medical care. Always follow your healthcare professional's instructions.                Follow-ups after your visit        Who to contact   "   If you have questions or need follow up information about today's clinic visit or your schedule please contact [facility] [place] directly at [phone].  Normal or non-critical lab and imaging results will be communicated to you by [facility], letter or phone within 4 business days after the clinic has received the results. If you do not hear from us within 7 days, please contact the clinic through [employer]ight[employer] or phone. If you have a critical or abnormal lab result, we will notify you by phone as soon as possible.  Submit refill requests through [employer] or call your pharmacy and they will forward the refill request to us. Please allow 3 business days for your refill to be completed.          Additional Information About Your Visit        [facility] Information     [employer] gives you secure access to your electronic health record. If you see a primary care provider, you can also send messages to your care team and make appointments. If you have questions, please call your primary care clinic.  If you do not have a primary care provider, please call [phone] and they will assist you.        Care EveryWhere ID     This is your Care EveryWhere ID. This could be used by other organizations to access your [facility] medical records  [id number]        Your Vitals Were     Pulse Temperature Height Pulse Oximetry BMI (Body Mass Index)       71 98.6  F (37  C) (Tympanic) 5' 4\" (1.626 m) 98% 34.84 kg/m2        Blood Pressure from Last 3 Encounters:   03/29/18 113/79   12/12/17 112/80   10/31/17 118/80    Weight from Last 3 Encounters:   03/29/18 203 lb (92.1 kg)   12/12/17 205 lb (93 kg)   10/31/17 204 lb 6.4 oz (92.7 kg)              Today, you had the following     No orders found for display         Today's Medication Changes          These changes are accurate as of 3/29/18  1:29 PM.  If you have any questions, ask your nurse or doctor.               These medicines have changed or have updated prescriptions.     "    Dose/Directions    buPROPion 300 MG 24 hr tablet   Commonly known as:  WELLBUTRIN XL   This may have changed:    - medication strength  - how much to take   Used for:  Mild major depression (H), Major depressive disorder, recurrent episode, mild (H)   Changed by:  Ashley Chavez MD        Dose:  300 mg   Take 1 tablet (300 mg) by mouth every morning   Quantity:  90 tablet   Refills:  1            Where to get your medicines      These medications were sent to [id number] - [place] - [address] [facility]  [address] [facility] [address]     Phone:  [phone]     buPROPion 300 MG 24 hr tablet    citalopram 20 MG tablet    levothyroxine 112 MCG tablet                Primary Care Provider Office Phone # Fax #    Ashley Chavez [phone] [address]  [place] [address]        Equal Access to Services     [facility]: Hadii rafaela trevino hadasho Soconcetta, waaxda luqadaha, qaybta kaalmada adeegyada, kim marshall . So [facility] [phone].    ATENCIÓN: Si habla español, tiene a rome disposición servicios gratuitos de asistencia lingüística. [facility] [phone].    We comply with applicable federal civil rights laws and [place] laws. We do not discriminate on the basis of race, color, national origin, age, disability, sex, sexual orientation, or gender identity.            Thank you!     Thank you for choosing [facility] [place]  for your care. Our goal is always to provide you with excellent care. Hearing back from our patients is one way we can continue to improve our services. Please take a few minutes to complete the written survey that you may receive in the mail after your visit with us. Thank you!             Your Updated Medication List - Protect others around you: Learn how to safely use, store and throw away your medicines at www.disposemymeds.org.          This list is accurate as of 3/29/18  1:30 PM.  Always  use your most recent med list.                   Brand Name Dispense Instructions for use Diagnosis    buPROPion 300 MG 24 hr tablet    WELLBUTRIN XL    90 tablet    Take 1 tablet (300 mg) by mouth every morning    Mild major depression (H), Major depressive disorder, recurrent episode, mild (H)       chlorthalidone 25 MG tablet    HYGROTON    90 tablet    TAKE 1/2 TABLET BY MOUTH DAILY    Hypertension, goal below 140/90       citalopram 20 MG tablet    celeXA    90 tablet    Take 1 tablet (20 mg) by mouth daily    Major depressive disorder, recurrent episode, mild (H), Mild major depression (H)       fish oil-omega-3 fatty acids 1000 MG capsule      Take 1 capsule by mouth daily.        fluocinolone acetonide 0.01 % Oil     60 mL    Place 5 drops in ear(s) 2 times daily as needed    Dysfunction of Eustachian tube, unspecified laterality       levothyroxine 112 MCG tablet    SYNTHROID/LEVOTHROID    90 tablet    TAKE 1 TABLET (112 MCG) BY MOUTH DAILY    Hypothyroidism, unspecified type       metroNIDAZOLE 0.75 % cream    METROCREAM    100 g    1 application at night for rosacea.    Rosacea       vitamin D 1000 UNITS capsule      Take 2 capsules by mouth daily

## 2018-03-29 NOTE — PATIENT INSTRUCTIONS
Weight Management: Fact and Fiction    Knowing the truth about losing weight can help you separate what works from what doesn t. Don t be taken in by expensive weight-loss fads like pills, herbs, and special foods that promise unbelievable results. There s no magic way to lose weight. If you have questions about weight loss, ask your healthcare provider.  Fiction:  The faster I lose weight, the better.   Fact: Rapid weight loss is usually due to loss of water or muscle mass. What you re trying to get rid of is extra fat. Aim to lose a 1/2 pound to 2 pounds a week. Then you re more likely to lose fat rather than water or muscle.  Fiction:  Skipping meals will help me lose weight.   Fact: When you skip meals, you don t give your body the energy it needs to work. Hunger makes you more likely to overeat later on. It s best to spread your meals throughout the day. Eat at least three meals a day.  Fiction:  I can t start exercising until I lose weight.   Fact: The sooner you start exercising the better. Exercise helps burn more calories, tone your muscles, and keep your appetite in check. People who continue to exercise after they lose weight are more likely to keep the weight off.  Fiction:  The fewer calories I eat, the better.   Fact: This seems like it should be true, but it s not. When you eat too few calories, your body acts as if it s on a desert island. It thinks food is scarce, so it slows down your metabolism (how fast you burn calories) to save energy. By eating too few calories, you make it harder to lose weight.  Fiction:  Once I lose weight, I can go back to living the way I did before.   Fact: Going back to your old eating habits and giving up exercise is a sure way to regain any weight you ve lost. The lifestyle changes that help you lose extra weight can also help keep it off. This is why you need to make realistic changes you can stick with.  Fiction:  Low-fat and fat-free mean low-calorie.   Fact: All  foods, even fat-free ones, have calories. Eat too many calories and you ll gain weight. It s OK to treat yourself to a fat-free cookie or two. Just don t eat the whole box! A dietitian will help you figure this out, and will likely recommend that you eat three meals a day, with protein with each meal.   Date Last Reviewed: 2/4/2016 2000-2017 [employer]. [address] [place] [address], [place] [address]. All rights reserved. This information is not intended as a substitute for professional medical care. Always follow your healthcare professional's instructions.        Weight Management: Healthy Eating  Food is your body s fuel. You can t live without it. The key is to give your body enough nutrients and energy without eating too much. Reading food labels can help you make healthy choices. Also, learn new eating habits to manage your weight.     All the values on the label are based on one serving. The serving size is the average portion. Remember to multiply the values on the label by the number of servings you eat.   Eat less fat  A gram of fat has almost 2.5 times the calories of a gram of protein or carbohydrates. Try to balance your food choices so that only 20% to 35% of your calories comes from total fat. This means an average of 2  to 3  grams of fat for each 100 calories you eat.  Eat more fiber  High-fiber foods are digested more slowly than low-fiber foods, so you feel full longer. Try to get at least 25 grams of fiber each day for a 2000 calorie diet. Foods high in fiber include:    Vegetables and fruits    Whole-grain or bran breads, pastas, and cereals    Legumes (beans) and peas  As you begin to eat more fiber, be sure to drink plenty of water to keep your digestive system working smoothly.  Tips  Do's and don'ts include:     Don t skip meals. This often leads to overeating later on. It s best to spread your eating throughout the day.    Eat a variety of foods, not just a few favorites.    If  you find yourself eating when you re not hungry, ask yourself why. Many of us eat when we re bored, stressed, or just to be polite. Listen to your body. If you re not hungry, get busy doing something else instead of eating.    Eat slower, shooting for 20 to 30 minutes for each meal. It takes 20 minutes for your stomach to tell your brain that it s full. Slow eaters tend to eat less and are still satisfied, while fast eaters may tend to be overeaters.     Pay attention to what you eat. Don t read or watch TV during your meal.  Date Last Reviewed: 1/31/2016 2000-2017 [employer]. [address] [place] [address], [place] [address]. All rights reserved. This information is not intended as a substitute for professional medical care. Always follow your healthcare professional's instructions.        Losing Weight for Heart Health  Excess weight is a major risk factor for heart disease. Losing weight has many benefits including lowering your blood pressure, improving your cholesterol level, and decreasing your risk for diseases such as diabetes and heart disease. It may help keep your arteries open so that your heart can get the oxygen-rich blood it needs. All in all, losing weight makes you healthier.     Exercise with a friend. When activity is fun, you're more likely to stick with it.   Calories and weight loss    Calories are the fuel your body burns for energy. You get the calories you need from the food you eat. For healthy weight loss, women should eat at least 1,200 calories a day, men at least 1,500.    When you eat more calories than you need, your body stores the extra calories as fat. One pound of fat equals 3,500 calories.    To lose weight, try to reduce your total calorie intake by 500 calories. To do this, eat 250 calories less each day. Add activity to burn the other 250 calories. Walking 2.5 miles burns about 250 calories. Other more intense activities can burn more calories in the time you spend  doing them, such as swimming and running. It is important to understand that reducing calorie intake is much more effective at weight loss than is exercise.    Eat a variety of healthy foods to get the nutrients you need.  Tips for losing weight    Drink 8 to 10 glasses of water a day.    Don t skip meals. Instead, eat smaller portions.    Eat your meals earlier in the day.    Cut out sugary drinks such as soda and fruit juices.    Make your later meals lighter than your earlier meals.   Brisk activity is best  Brisk activity gets your heart pumping faster and it makes it healthier. It s also a great way to burn calories. In fact, your body may keep burning calories for hours after you stop a brisk activity:    Begin by walking 10 minutes most days.    Add more time and speed to your walk. Build up as you feel able.    Aim for 3 to 4 sessions of aerobic exercise a week. Each session should last about 40 minutes and include moderate to vigorous physical activity.    The most important part of the activity is that you break a sweat. This indicates your heart is working hard enough to burn fat.  Date Last Reviewed: 6/1/2016 2000-2017 [employer]. [address] [place] [address]. All rights reserved. This information is not intended as a substitute for professional medical care. Always follow your healthcare professional's instructions.

## 2018-03-29 NOTE — PROGRESS NOTES
SUBJECTIVE:   Taylor Baeza is a 52 year old female who presents to clinic today for the following health issues:      Medication Followup of  Levothyroxine, Wellbutrin 300mg, Citalopram     Taking Medication as prescribed: yes increased Wellbutrin to 300 mg     Side Effects:  None    Medication Helping Symptoms:  yes     Depression and Anxiety Follow-Up    Status since last visit: Improved , her fatigue has also improved a lot she is feeling more energetic. Mood is much better and thins Wellbutrin has helped a lot     Other associated symptoms:None    Complicating factors:     Significant life event: No     Current substance abuse: None    PHQ-9 10/31/2017 12/12/2017 3/29/2018   Total Score 18 18 2   Q9: Suicide Ideation Not at all Not at all Not at all     GAVIN-7 SCORE 9/8/2017 10/31/2017 3/29/2018   Total Score - - -   Total Score 5 7 0       PHQ-9  English  PHQ-9   Any Language  GAVIN-7  Suicide Assessment Five-step Evaluation and Treatment (SAFE-T)  Hypothyroidism Follow-up      Since last visit, patient describes the following symptoms: Weight stable , no hair loss, no skin changes, no constipation, no loose stools      Amount of exercise or physical activity: None    Problems taking medications regularly: No    Medication side effects: none    Diet: regular (no restrictions)    She is also frustrated with not be able not loose weight         PROBLEMS TO ADD ON...  Could use refill thyroid med's feeling fine. And had normal labs     Problem list and histories reviewed & adjusted, as indicated.  Additional history: as documented    Patient Active Problem List   Diagnosis     Hypothyroidism     Morbid obesity (H)     Rosacea     CARDIOVASCULAR SCREENING; LDL GOAL LESS THAN 130     Plantar fasciitis     Anxiety     Enthesopathy of ankle and tarsus     Snoring     GERD (gastroesophageal reflux disease)     Hypertension, goal below 140/90     Panic attacks     Vitamin D deficiency     Paresthesia     Hyperglycemia  "    Sleep disorder     Mild major depression (H)     Cystic acne     Other specified hypothyroidism     Hyperlipidemia LDL goal <130     Hypertension     Hypothyroid     Other insomnia     Idiopathic neuropathy     BMI 34.0-34.9,adult     Past Surgical History:   Procedure Laterality Date     BREAST SURGERY      breast reduction     COLONOSCOPY N/A 10/19/2017    Procedure: COMBINED COLONOSCOPY, SINGLE OR MULTIPLE BIOPSY/POLYPECTOMY BY BIOPSY;  colonoscopy;  Surgeon: Marquise Haskins MD;  Location:  GI     TONSILLECTOMY  1970       Social History   Substance Use Topics     Smoking status: Never Smoker     Smokeless tobacco: Never Used     Alcohol use 0.0 oz/week     0 Standard drinks or equivalent per week      Comment: 2 x week      Family History   Problem Relation Age of Onset     DIABETES Sister      Thyroid Disease Father      Hypertension Father      Hypertension Mother      DIABETES Maternal Grandmother      Breast Cancer No family hx of      Cancer - colorectal No family hx of      C.A.D. No family hx of            Reviewed and updated as needed this visit by clinical staff  Tobacco  Allergies  Meds       Reviewed and updated as needed this visit by Provider         ROS:  Constitutional, HEENT, cardiovascular, pulmonary, GI, , musculoskeletal, neuro, skin, endocrine and psych systems are negative, except as otherwise noted.    OBJECTIVE:     /79  Pulse 71  Temp 98.6  F (37  C) (Tympanic)  Ht 5' 4\" (1.626 m)  Wt 203 lb (92.1 kg)  SpO2 98%  BMI 34.84 kg/m2  Body mass index is 34.84 kg/(m^2).  GENERAL: healthy, alert and no distress  NECK: no adenopathy, no asymmetry, masses, or scars and thyroid normal to palpation  RESP: lungs clear to auscultation - no rales, rhonchi or wheezes  CV: regular rate and rhythm, normal S1 S2, no S3 or S4, no murmur, click or rub,   PSYCH: mentation appears normal, affect normal/bright        ASSESSMENT/PLAN:           (F32.0) Mild major depression " (H)  Comment:   Plan: buPROPion (WELLBUTRIN XL) 300 MG 24 hr tablet,         citalopram (CELEXA) 20 MG tablet            (F33.0) Major depressive disorder, recurrent episode, mild (H)  Comment:   Plan: buPROPion (WELLBUTRIN XL) 300 MG 24 hr tablet,         citalopram (CELEXA) 20 MG tablet            (E03.9) Hypothyroidism, unspecified type  Comment:   Plan: levothyroxine (SYNTHROID/LEVOTHROID) 112 MCG         tablet            (Z68.34) BMI 34.0-34.9,adult  (primary encounter diagnosis)  Comment:   Plan: Healthy diet and exercise reviewed.  Limit pop and juice intake.  Risks of obesity discussed.  Encourage exercise.  Limit TV/Computer/game time to one hour a day.    Patient expressed understanding and agreement with treatment plan. All patient's questions were answered, will let me know if has more later.  Medications: Rx's: Reviewed the potential side effects/complications of medications prescribed.       Ashley Chavez MD  [facility] [place]

## 2018-03-29 NOTE — NURSING NOTE
"Chief Complaint   Patient presents with     Recheck Medication       Initial /79  Pulse 71  Temp 98.6  F (37  C) (Tympanic)  Ht 5' 4\" (1.626 m)  Wt 203 lb (92.1 kg)  SpO2 98%  BMI 34.84 kg/m2 Estimated body mass index is 34.84 kg/(m^2) as calculated from the following:    Height as of this encounter: 5' 4\" (1.626 m).    Weight as of this encounter: 203 lb (92.1 kg).  Medication Reconciliation: complete  "

## 2018-03-30 ASSESSMENT — ANXIETY QUESTIONNAIRES: GAD7 TOTAL SCORE: 0

## 2018-03-30 ASSESSMENT — PATIENT HEALTH QUESTIONNAIRE - PHQ9: SUM OF ALL RESPONSES TO PHQ QUESTIONS 1-9: 2

## 2018-06-04 ENCOUNTER — TELEPHONE (OUTPATIENT)
Dept: FAMILY MEDICINE | Facility: CLINIC | Age: 53
End: 2018-06-04

## 2018-06-04 ENCOUNTER — MYC MEDICAL ADVICE (OUTPATIENT)
Dept: FAMILY MEDICINE | Facility: CLINIC | Age: 53
End: 2018-06-04

## 2018-06-04 NOTE — LETTER
June 20, 2018      Taylor Baeza  [address]  [facility] [address]        Dear Taylor,         Dr. Ashley Chavez MD has completed a review of your chart. She would like to know how you are doing with your depression symptoms. She would greatly appreciate it if you could please take a moment to complete and return the attached questionnaire.     Thank you,    Annmarie  for :  Dr. Ashley Agudelo

## 2018-06-04 NOTE — TELEPHONE ENCOUNTER
Pt is due now to update PHQ9.  gabrielhart message sent to pt. Follow up end date 8/12/18.   PHQ-9 SCORE 10/31/2017 12/12/2017 3/29/2018   Total Score - - -   Total Score MyChart - - -   Total Score 18 18 2     Jean CUETO CMA

## 2018-06-19 NOTE — TELEPHONE ENCOUNTER
Non-detailed message left to return our call.  Mee Mulligan RN - Triage  [facility]

## 2018-06-20 NOTE — TELEPHONE ENCOUNTER
2nd attempt made to call patient with voice mail left asking to return call.    Routing to team to send PHQ-9 via mail.  Mee Mulligan RN - Triage  [facility]

## 2018-06-26 ASSESSMENT — PATIENT HEALTH QUESTIONNAIRE - PHQ9
SUM OF ALL RESPONSES TO PHQ QUESTIONS 1-9: 2
10. IF YOU CHECKED OFF ANY PROBLEMS, HOW DIFFICULT HAVE THESE PROBLEMS MADE IT FOR YOU TO DO YOUR WORK, TAKE CARE OF THINGS AT HOME, OR GET ALONG WITH OTHER PEOPLE: NOT DIFFICULT AT ALL
SUM OF ALL RESPONSES TO PHQ QUESTIONS 1-9: 2

## 2018-06-26 NOTE — TELEPHONE ENCOUNTER
PHQ-9 SCORE 12/12/2017 3/29/2018 6/26/2018   Total Score - - -   Total Score MyChart - - 2 (Minimal depression)   Total Score 18 2 2

## 2018-06-27 ASSESSMENT — PATIENT HEALTH QUESTIONNAIRE - PHQ9: SUM OF ALL RESPONSES TO PHQ QUESTIONS 1-9: 2

## 2018-09-20 ENCOUNTER — OFFICE VISIT (OUTPATIENT)
Dept: FAMILY MEDICINE | Facility: CLINIC | Age: 53
End: 2018-09-20
Payer: COMMERCIAL

## 2018-09-20 VITALS
HEIGHT: 64 IN | HEART RATE: 61 BPM | SYSTOLIC BLOOD PRESSURE: 128 MMHG | WEIGHT: 184 LBS | OXYGEN SATURATION: 99 % | TEMPERATURE: 98.4 F | DIASTOLIC BLOOD PRESSURE: 80 MMHG | BODY MASS INDEX: 31.41 KG/M2

## 2018-09-20 DIAGNOSIS — F32.A DEPRESSION, UNSPECIFIED DEPRESSION TYPE: Primary | ICD-10-CM

## 2018-09-20 DIAGNOSIS — F41.9 ANXIETY: ICD-10-CM

## 2018-09-20 DIAGNOSIS — Z23 NEED FOR PROPHYLACTIC VACCINATION AND INOCULATION AGAINST INFLUENZA: ICD-10-CM

## 2018-09-20 DIAGNOSIS — G47.09 OTHER INSOMNIA: ICD-10-CM

## 2018-09-20 PROCEDURE — 99213 OFFICE O/P EST LOW 20 MIN: CPT | Mod: 25 | Performed by: FAMILY MEDICINE

## 2018-09-20 PROCEDURE — 90471 IMMUNIZATION ADMIN: CPT | Performed by: FAMILY MEDICINE

## 2018-09-20 PROCEDURE — 90686 IIV4 VACC NO PRSV 0.5 ML IM: CPT | Performed by: FAMILY MEDICINE

## 2018-09-20 RX ORDER — CITALOPRAM HYDROBROMIDE 20 MG/1
20 TABLET ORAL DAILY
Qty: 90 TABLET | Refills: 1 | Status: SHIPPED | OUTPATIENT
Start: 2018-09-20 | End: 2019-04-10

## 2018-09-20 RX ORDER — BUPROPION HYDROCHLORIDE 300 MG/1
300 TABLET ORAL EVERY MORNING
Qty: 90 TABLET | Refills: 1 | Status: SHIPPED | OUTPATIENT
Start: 2018-09-20 | End: 2019-05-26

## 2018-09-20 ASSESSMENT — ANXIETY QUESTIONNAIRES
IF YOU CHECKED OFF ANY PROBLEMS ON THIS QUESTIONNAIRE, HOW DIFFICULT HAVE THESE PROBLEMS MADE IT FOR YOU TO DO YOUR WORK, TAKE CARE OF THINGS AT HOME, OR GET ALONG WITH OTHER PEOPLE: SOMEWHAT DIFFICULT
7. FEELING AFRAID AS IF SOMETHING AWFUL MIGHT HAPPEN: NOT AT ALL
6. BECOMING EASILY ANNOYED OR IRRITABLE: NOT AT ALL
2. NOT BEING ABLE TO STOP OR CONTROL WORRYING: SEVERAL DAYS
3. WORRYING TOO MUCH ABOUT DIFFERENT THINGS: SEVERAL DAYS
5. BEING SO RESTLESS THAT IT IS HARD TO SIT STILL: NOT AT ALL
1. FEELING NERVOUS, ANXIOUS, OR ON EDGE: SEVERAL DAYS
GAD7 TOTAL SCORE: 3

## 2018-09-20 ASSESSMENT — PATIENT HEALTH QUESTIONNAIRE - PHQ9: 5. POOR APPETITE OR OVEREATING: NOT AT ALL

## 2018-09-20 NOTE — MR AVS SNAPSHOT
After Visit Summary   9/20/2018    Taylor Baeza    MRN: [id number]           Patient Information     Date Of Birth          1965        Visit Information        Provider Department      9/20/2018 1:00 PM Ashley Chavez MD [facility]        Today's Diagnoses     Need for prophylactic vaccination and inoculation against influenza    -  1    Major depressive disorder, recurrent episode, mild (H)        Mild major depression (H)          Care Instructions    Take medications as directed.  Treatment and symptomatic cares discussed   Follow up if problem or concern               Follow-ups after your visit        Follow-up notes from your care team     Return in about 6 months (around 3/20/2019), or sooner if problem , for Physical Exam, next month .      Who to contact     If you have questions or need follow up information about today's clinic visit or your schedule please [facility] directly at [phone].  Normal or non-critical lab and imaging results will be communicated to you by MyChart, letter or phone within 4 business days after the clinic has received the results. If you do not hear from us within 7 days, please contact the clinic through MIOXhart or phone. If you have a critical or abnormal lab result, we will notify you by phone as soon as possible.  Submit refill requests through EmpowrNet or call your pharmacy and they will forward the refill request to us. Please allow 3 business days for your refill to be completed.          Additional Information About Your Visit        MyChart Information     EmpowrNet gives you secure access to your electronic health record. If you see a primary care provider, you can also send messages to your care team and make appointments. If you have questions, please call your primary care clinic.  If you do not have a primary care provider, please call [phone] and they will assist you.        Care  "EveryWhere ID     This is your Care EveryWhere ID. This could be used by other organizations to access your [place] medical records  [id number]        Your Vitals Were     Pulse Temperature Height Pulse Oximetry BMI (Body Mass Index)       61 98.4  F (36.9  C) (Tympanic) 5' 4\" (1.626 m) 99% 31.58 kg/m2        Blood Pressure from Last 3 Encounters:   09/20/18 128/80   03/29/18 113/79   12/12/17 112/80    Weight from Last 3 Encounters:   09/20/18 184 lb (83.5 kg)   03/29/18 203 lb (92.1 kg)   12/12/17 205 lb (93 kg)              We Performed the Following     FLU VACCINE, SPLIT VIRUS, IM (QUADRIVALENT) [82757]- >3 YRS     Vaccine Administration, Initial [62585]          Where to get your medicines      These medications were sent to [id number] - [place], [place] - [address] [facility]  [address] [place] [address]     Phone:  [phone]     buPROPion 300 MG 24 hr tablet    citalopram 20 MG tablet          Primary Care Provider Office Phone # Fax #    Ashley Chavez [phone]       [address]  [place] [address]        Equal Access to Services     [facility] AH: Hadii aad ku hadasho Soomaali, waaxda luqadaha, qaybta kaalmada adeegyada, kim marshall . So [facility] [phone].    ATENCIÓN: Si habla español, tiene a rome disposición servicios gratuitos de asistencia lingüística. Llame al [phone].    We comply with applicable federal civil rights laws and [place] laws. We do not discriminate on the basis of race, color, national origin, age, disability, sex, sexual orientation, or gender identity.            Thank you!     Thank you for choosing [facility] [place]  for your care. Our goal is always to provide you with excellent care. Hearing back from our patients is one way we can continue to improve our services. Please take a few minutes to complete the written survey that you may receive in the mail after your visit with us. " Thank you!             Your Updated Medication List - Protect others around you: Learn how to safely use, store and throw away your medicines at www.disposemymeds.org.          This list is accurate as of 9/20/18  1:44 PM.  Always use your most recent med list.                   Brand Name Dispense Instructions for use Diagnosis    buPROPion 300 MG 24 hr tablet    WELLBUTRIN XL    90 tablet    Take 1 tablet (300 mg) by mouth every morning    Mild major depression (H), Major depressive disorder, recurrent episode, mild (H)       chlorthalidone 25 MG tablet    HYGROTON    90 tablet    TAKE 1/2 TABLET BY MOUTH DAILY    Hypertension, goal below 140/90       citalopram 20 MG tablet    celeXA    90 tablet    Take 1 tablet (20 mg) by mouth daily    Major depressive disorder, recurrent episode, mild (H), Mild major depression (H)       fish oil-omega-3 fatty acids 1000 MG capsule      Take 1 capsule by mouth daily.        fluocinolone acetonide 0.01 % Oil     60 mL    Place 5 drops in ear(s) 2 times daily as needed    Dysfunction of Eustachian tube, unspecified laterality       levothyroxine 112 MCG tablet    SYNTHROID/LEVOTHROID    90 tablet    TAKE 1 TABLET (112 MCG) BY MOUTH DAILY    Hypothyroidism, unspecified type       metroNIDAZOLE 0.75 % cream    METROCREAM    100 g    1 application at night for rosacea.    Rosacea       vitamin D 1000 units capsule      Take 2 capsules by mouth daily

## 2018-09-20 NOTE — LETTER
My Depression Action Plan  Name: Taylor Baeza   Date of Birth 1965  Date: 9/20/2018    My doctor: Ashley Chavez   My clinic: [address] [address]  [phone]          GREEN    ZONE   Good Control    What it looks like:     Things are going generally well. You have normal up s and down s. You may even feel depressed from time to time, but bad moods usually last less than a day.   What you need to do:  1. Continue to care for yourself (see self care plan)  2. Check your depression survival kit and update it as needed  3. Follow your physician s recommendations including any medication.  4. Do not stop taking medication unless you consult with your physician first.           YELLOW         ZONE Getting Worse    What it looks like:     Depression is starting to interfere with your life.     It may be hard to get out of bed; you may be starting to isolate yourself from others.    Symptoms of depression are starting to last most all day and this has happened for several days.     You may have suicidal thoughts but they are not constant.   What you need to do:     1. Call your care team, your response to treatment will improve if you keep your care team informed of your progress. Yellow periods are signs an adjustment may need to be made.     2. Continue your self-care, even if you have to fake it!    3. Talk to someone in your support network    4. Open up your depression survival kit           RED    ZONE Medical Alert - Get Help    What it looks like:     Depression is seriously interfering with your life.     You may experience these or other symptoms: You can t get out of bed most days, can t work or engage in other necessary activities, you have trouble taking care of basic hygiene, or basic responsibilities, thoughts of suicide or death that will not go away, self-injurious behavior.     What you need to do:  1. Call your  care team and request a same-day appointment. If they are not available (weekends or after hours) call your local crisis line, emergency room or 911.            Depression Self Care Plan / Survival Kit    Self-Care for Depression  Here s the deal. Your body and mind are really not as separate as most people think.  What you do and think affects how you feel and how you feel influences what you do and think. This means if you do things that people who feel good do, it will help you feel better.  Sometimes this is all it takes.  There is also a place for medication and therapy depending on how severe your depression is, so be sure to consult with your medical provider and/ or Behavioral Health Consultant if your symptoms are worsening or not improving.     In order to better manage my stress, I will:    Exercise  Get some form of exercise, every day. This will help reduce pain and release endorphins, the  feel good  chemicals in your brain. This is almost as good as taking antidepressants!  This is not the same as joining a gym and then never going! (they count on that by the way ) It can be as simple as just going for a walk or doing some gardening, anything that will get you moving.      Hygiene   Maintain good hygiene (Get out of bed in the morning, Make your bed, Brush your teeth, Take a shower, and Get dressed like you were going to work, even if you are unemployed).  If your clothes don't fit try to get ones that do.    Diet  I will strive to eat foods that are good for me, drink plenty of water, and avoid excessive sugar, caffeine, alcohol, and other mood-altering substances.  Some foods that are helpful in depression are: complex carbohydrates, B vitamins, flaxseed, fish or fish oil, fresh fruits and vegetables.    Psychotherapy  I agree to participate in Individual Therapy (if recommended).    Medication  If prescribed medications, I agree to take them.  Missing doses can result in serious side effects.  I  understand that drinking alcohol, or other illicit drug use, may cause potential side effects.  I will not stop my medication abruptly without first discussing it with my provider.    Staying Connected With Others  I will stay in touch with my friends, family members, and my primary care provider/team.    Use your imagination  Be creative.  We all have a creative side; it doesn t matter if it s oil painting, sand castles, or mud pies! This will also kick up the endorphins.    Witness Beauty  (AKA stop and smell the roses) Take a look outside, even in mid-winter. Notice colors, textures. Watch the squirrels and birds.     Service to others  Be of service to others.  There is always someone else in need.  By helping others we can  get out of ourselves  and remember the really important things.  This also provides opportunities for practicing all the other parts of the program.    Humor  Laugh and be silly!  Adjust your TV habits for less news and crime-drama and more comedy.    Control your stress  Try breathing deep, massage therapy, biofeedback, and meditation. Find time to relax each day.     My support system    Clinic Contact:  Phone number:    Contact 1:  Phone number:    Contact 2:  Phone number:    Sabianism/:  Phone number:    Therapist:  Phone number:    Local crisis center:    Phone number:    Other community support:  Phone number:

## 2018-09-20 NOTE — PATIENT INSTRUCTIONS
Take medications as directed.  Treatment and symptomatic cares discussed   Follow up if problem or concern

## 2018-09-20 NOTE — PROGRESS NOTES
SUBJECTIVE:   Taylor Baeza is a 53 year old female who presents to clinic today for the following health issues:      Depression Followup    Status since last visit: Stable , still feeling quite well on current medications.  She think Wellbutrin has helped her a lot.  Mood is fine.  she sleeps well although occasionally she wakes up early in the morning an hour or so before, she has to actually wake up. Sometimes she is able to go back to sleep.  She does not feel tired so she thinks she is doing okay,  comfortable with current medication wish to continue.     See PHQ-9 for current symptoms.  Other associated symptoms:  See phq-9 and yossi 7      Complicating factors:   Significant life event:  No   Current substance abuse:  None  Anxiety or Panic symptoms:  No    PHQ-9 12/12/2017 3/29/2018 6/26/2018   Total Score 18 2 2   Q9: Suicide Ideation Not at all Not at all Not at all       PHQ-9  English  PHQ-9   Any Language  Suicide Assessment Five-step Evaluation and Treatment (SAFE-T)    Amount of exercise or physical activity: 2-3 days/week for an average of 30-45 minutes    Problems taking medications regularly: No    Medication side effects: none    Diet: regular (no restrictions)        Problem list and histories reviewed & adjusted, as indicated.  Additional history: as documented    Patient Active Problem List   Diagnosis     Hypothyroidism     Morbid obesity (H)     Rosacea     CARDIOVASCULAR SCREENING; LDL GOAL LESS THAN 130     Plantar fasciitis     Anxiety     Enthesopathy of ankle and tarsus     Snoring     GERD (gastroesophageal reflux disease)     Hypertension, goal below 140/90     Panic attacks     Vitamin D deficiency     Paresthesia     Hyperglycemia     Sleep disorder     Mild major depression (H)     Cystic acne     Other specified hypothyroidism     Hyperlipidemia LDL goal <130     Hypertension     Hypothyroid     Other insomnia     Idiopathic neuropathy     BMI 34.0-34.9,adult     Past Surgical  "History:   Procedure Laterality Date     BREAST SURGERY      breast reduction     COLONOSCOPY N/A 10/19/2017    Procedure: COMBINED COLONOSCOPY, SINGLE OR MULTIPLE BIOPSY/POLYPECTOMY BY BIOPSY;  colonoscopy;  Surgeon: Marquise Haskins MD;  Location:  GI     TONSILLECTOMY  1970       Social History   Substance Use Topics     Smoking status: Never Smoker     Smokeless tobacco: Never Used     Alcohol use 0.0 oz/week     0 Standard drinks or equivalent per week      Comment: 2 x week      Family History   Problem Relation Age of Onset     Diabetes Sister      Thyroid Disease Father      Hypertension Father      Hypertension Mother      Diabetes Maternal Grandmother      Breast Cancer No family hx of      Cancer - colorectal No family hx of      C.A.D. No family hx of            Reviewed and updated as needed this visit by clinical staff       Reviewed and updated as needed this visit by Provider         ROS:  Constitutional, HEENT, cardiovascular, pulmonary, GI, , musculoskeletal, neuro, skin, endocrine and psych systems are negative, except as otherwise noted.    OBJECTIVE:     /80  Pulse 61  Temp 98.4  F (36.9  C) (Tympanic)  Ht 5' 4\" (1.626 m)  Wt 184 lb (83.5 kg)  SpO2 99%  BMI 31.58 kg/m2  Body mass index is 31.58 kg/(m^2).  GENERAL: healthy, alert and no distress  RESP: lungs clear to auscultation - no rales, rhonchi or wheezes  CV: regular rate and rhythm, normal S1 S2, no S3 or S4, no murmur, click or rub, no peripheral edema and peripheral pulses strong  ABDOMEN: soft, nontender, no hepatosplenomegaly, no masses and bowel sounds normal  PSYCH: mentation appears normal, affect normal/bright        ASSESSMENT/PLAN:         (F32.9) Depression, unspecified depression type  (primary encounter diagnosis)  Comment:   Plan: citalopram (CELEXA) 20 MG tablet, buPROPion         (WELLBUTRIN XL) 300 MG 24 hr tablet, DEPRESSION        ACTION PLAN (DAP)    (F41.9) Anxiety  Comment:   Plan: citalopram " (CELEXA) 20 MG tablet, buPROPion         (WELLBUTRIN XL) 300 MG 24 hr tablet, DEPRESSION        ACTION PLAN (DAP)            (G47.09) Other insomnia  Comment:   Plan: citalopram (CELEXA) 20 MG tablet, buPROPion         (WELLBUTRIN XL) 300 MG 24 hr tablet, DEPRESSION        ACTION PLAN (DAP)                        Discussed cares, talked about  treatment .  Willing to continue with same med's. . encouraged to see  to help and referral given . spent sometimes counseling patient. Follow up in 4-6 months, sooner if problem.       (Z23) Need for prophylactic vaccination and inoculation against influenza  (primary encounter diagnosis)  Comment:   Plan: FLU VACCINE, SPLIT VIRUS, IM (QUADRIVALENT)         [52025]- >3 YRS, Vaccine Administration,         Initial [54310]            Patient expressed understanding and agreement with treatment plan. All patient's questions were answered, will let me know if has more later.  Medications: Rx's: Reviewed the potential side effects/complications of medications prescribed.     Ashley Chavez MD  [facility] [place]      Injectable Influenza Immunization Documentation    1.  Is the person to be vaccinated sick today?   No    2. Does the person to be vaccinated have an allergy to a component   of the vaccine?   No  Egg Allergy Algorithm Link    3. Has the person to be vaccinated ever had a serious reaction   to influenza vaccine in the past?   No    4. Has the person to be vaccinated ever had Guillain-Barré syndrome?   No    Form completed by af

## 2018-09-21 ASSESSMENT — ANXIETY QUESTIONNAIRES: GAD7 TOTAL SCORE: 3

## 2018-09-21 ASSESSMENT — PATIENT HEALTH QUESTIONNAIRE - PHQ9: SUM OF ALL RESPONSES TO PHQ QUESTIONS 1-9: 3

## 2019-04-10 ENCOUNTER — OFFICE VISIT (OUTPATIENT)
Dept: FAMILY MEDICINE | Facility: CLINIC | Age: 54
End: 2019-04-10
Payer: COMMERCIAL

## 2019-04-10 VITALS
HEIGHT: 64 IN | TEMPERATURE: 97.8 F | HEART RATE: 83 BPM | BODY MASS INDEX: 35 KG/M2 | SYSTOLIC BLOOD PRESSURE: 124 MMHG | OXYGEN SATURATION: 96 % | DIASTOLIC BLOOD PRESSURE: 84 MMHG | WEIGHT: 205 LBS

## 2019-04-10 DIAGNOSIS — F32.0 MILD MAJOR DEPRESSION (H): ICD-10-CM

## 2019-04-10 DIAGNOSIS — K58.0 IRRITABLE BOWEL SYNDROME WITH DIARRHEA: ICD-10-CM

## 2019-04-10 DIAGNOSIS — K57.30 DIVERTICULOSIS OF COLON: ICD-10-CM

## 2019-04-10 DIAGNOSIS — F41.9 ANXIETY: ICD-10-CM

## 2019-04-10 DIAGNOSIS — I10 HYPERTENSION, GOAL BELOW 140/90: ICD-10-CM

## 2019-04-10 DIAGNOSIS — K62.5 RECTAL BLEEDING: ICD-10-CM

## 2019-04-10 DIAGNOSIS — R53.83 OTHER FATIGUE: Primary | ICD-10-CM

## 2019-04-10 DIAGNOSIS — E03.8 OTHER SPECIFIED HYPOTHYROIDISM: ICD-10-CM

## 2019-04-10 LAB
ALBUMIN SERPL-MCNC: 3.5 G/DL (ref 3.4–5)
ALP SERPL-CCNC: 64 U/L (ref 40–150)
ALT SERPL W P-5'-P-CCNC: 28 U/L (ref 0–50)
ANION GAP SERPL CALCULATED.3IONS-SCNC: 8 MMOL/L (ref 3–14)
AST SERPL W P-5'-P-CCNC: 19 U/L (ref 0–45)
BILIRUB SERPL-MCNC: 0.2 MG/DL (ref 0.2–1.3)
BUN SERPL-MCNC: 13 MG/DL (ref 7–30)
CALCIUM SERPL-MCNC: 8.7 MG/DL (ref 8.5–10.1)
CHLORIDE SERPL-SCNC: 111 MMOL/L (ref 94–109)
CO2 SERPL-SCNC: 23 MMOL/L (ref 20–32)
CREAT SERPL-MCNC: 0.62 MG/DL (ref 0.52–1.04)
ERYTHROCYTE [DISTWIDTH] IN BLOOD BY AUTOMATED COUNT: 13 % (ref 10–15)
GFR SERPL CREATININE-BSD FRML MDRD: >90 ML/MIN/{1.73_M2}
GLUCOSE SERPL-MCNC: 110 MG/DL (ref 70–99)
HCT VFR BLD AUTO: 41.7 % (ref 35–47)
HGB BLD-MCNC: 13.7 G/DL (ref 11.7–15.7)
MCH RBC QN AUTO: 31.7 PG (ref 26.5–33)
MCHC RBC AUTO-ENTMCNC: 32.9 G/DL (ref 31.5–36.5)
MCV RBC AUTO: 97 FL (ref 78–100)
PLATELET # BLD AUTO: 203 10E9/L (ref 150–450)
POTASSIUM SERPL-SCNC: 3.8 MMOL/L (ref 3.4–5.3)
PROT SERPL-MCNC: 6.9 G/DL (ref 6.8–8.8)
RBC # BLD AUTO: 4.32 10E12/L (ref 3.8–5.2)
SODIUM SERPL-SCNC: 142 MMOL/L (ref 133–144)
TSH SERPL DL<=0.005 MIU/L-ACNC: 1.18 MU/L (ref 0.4–4)
WBC # BLD AUTO: 7.3 10E9/L (ref 4–11)

## 2019-04-10 PROCEDURE — 82306 VITAMIN D 25 HYDROXY: CPT | Performed by: FAMILY MEDICINE

## 2019-04-10 PROCEDURE — 99214 OFFICE O/P EST MOD 30 MIN: CPT | Performed by: FAMILY MEDICINE

## 2019-04-10 PROCEDURE — 84443 ASSAY THYROID STIM HORMONE: CPT | Performed by: FAMILY MEDICINE

## 2019-04-10 PROCEDURE — 85027 COMPLETE CBC AUTOMATED: CPT | Performed by: FAMILY MEDICINE

## 2019-04-10 PROCEDURE — 80053 COMPREHEN METABOLIC PANEL: CPT | Performed by: FAMILY MEDICINE

## 2019-04-10 PROCEDURE — 36415 COLL VENOUS BLD VENIPUNCTURE: CPT | Performed by: FAMILY MEDICINE

## 2019-04-10 RX ORDER — CITALOPRAM HYDROBROMIDE 20 MG/1
TABLET ORAL
Refills: 1 | COMMUNITY
Start: 2019-03-17 | End: 2019-04-10

## 2019-04-10 RX ORDER — CHLORTHALIDONE 25 MG/1
25 TABLET ORAL DAILY
Qty: 30 TABLET | Refills: 0 | Status: SHIPPED | OUTPATIENT
Start: 2019-04-10 | End: 2019-05-13

## 2019-04-10 RX ORDER — CITALOPRAM HYDROBROMIDE 20 MG/1
30 TABLET ORAL DAILY
Qty: 45 TABLET | Refills: 1 | Status: SHIPPED | OUTPATIENT
Start: 2019-04-10 | End: 2019-06-24

## 2019-04-10 ASSESSMENT — ANXIETY QUESTIONNAIRES
IF YOU CHECKED OFF ANY PROBLEMS ON THIS QUESTIONNAIRE, HOW DIFFICULT HAVE THESE PROBLEMS MADE IT FOR YOU TO DO YOUR WORK, TAKE CARE OF THINGS AT HOME, OR GET ALONG WITH OTHER PEOPLE: SOMEWHAT DIFFICULT
2. NOT BEING ABLE TO STOP OR CONTROL WORRYING: NEARLY EVERY DAY
7. FEELING AFRAID AS IF SOMETHING AWFUL MIGHT HAPPEN: SEVERAL DAYS
1. FEELING NERVOUS, ANXIOUS, OR ON EDGE: NEARLY EVERY DAY
6. BECOMING EASILY ANNOYED OR IRRITABLE: SEVERAL DAYS
5. BEING SO RESTLESS THAT IT IS HARD TO SIT STILL: SEVERAL DAYS
GAD7 TOTAL SCORE: 13
3. WORRYING TOO MUCH ABOUT DIFFERENT THINGS: NEARLY EVERY DAY

## 2019-04-10 ASSESSMENT — PATIENT HEALTH QUESTIONNAIRE - PHQ9
5. POOR APPETITE OR OVEREATING: SEVERAL DAYS
SUM OF ALL RESPONSES TO PHQ QUESTIONS 1-9: 15

## 2019-04-10 ASSESSMENT — MIFFLIN-ST. JEOR: SCORE: 1514.87

## 2019-04-10 NOTE — PROGRESS NOTES
SUBJECTIVE:   Taylor Baeza is a 53 year old female who presents to clinic today for the following   health issues:      Gastrointestinal symptoms      Duration:  One  months     Description:           SYMPTOMS - Sx of  IB'S, multiple bm a day, diarrhea  X 1 month on and off, so unsure if it could be stress related              BLEEDING - bright red blood in the stool and bright red blood when wiping yesterday twice, although not always. Bm may be 3-4/ day, mostly she goes  once a day. She take imodium on and off and she thinks then she gets constipated and unsure if that would have irritated . Has some soreness in rectal area . No mucous in stool.        Intensity:  mild, moderate    Accompanying signs and symptoms:loose stools and abdominal cramping on and off mostly. Has more on rt side and mis abdomen to  back pain sometimes and it tends to be associated with too much acid or anxiety.  had u/s many yrs ago but no gall stone      Also had colonoscopy in 2017, had polyp removed. Also was told she has diverticulosis       History  Previous  similar problem: YES  Previous evaluation:  As above     Aggravating factors: none    Alleviating factors: lifestyle change including     Other Therapies tried: None      PROBLEMS TO ADD ON...  Has been feeling more tired lately. Has gained, weight. No fever or chills. No other uri sx     Depression and Anxiety Follow-Up    Status since last visit: Worsened     Other associated symptoms: See phq-9 and yossi 7    Complicating factors: stress     Significant life event: work stress.  is working too many yrs ,daughter has moved away few months ago     Current substance abuse: None    PHQ 3/29/2018 6/26/2018 9/20/2018   PHQ-9 Total Score 2 2 3   Q9: Thoughts of better off dead/self-harm past 2 weeks Not at all Not at all Not at all     YOSSI-7 SCORE 10/31/2017 3/29/2018 9/20/2018   Total Score - - -   Total Score 7 0 3       PHQ-9  English  PHQ-9   Any Language  YOSSI-7  Suicide  "Assessment Five-step Evaluation and Treatment (SAFE-T)    Additional history: as documented    Reviewed  and updated as needed this visit by clinical staff         Reviewed and updated as needed this visit by Provider         Patient Active Problem List   Diagnosis     Hypothyroidism     Morbid obesity (H)     Rosacea     CARDIOVASCULAR SCREENING; LDL GOAL LESS THAN 130     Plantar fasciitis     Anxiety     Enthesopathy of ankle and tarsus     Snoring     GERD (gastroesophageal reflux disease)     Hypertension, goal below 140/90     Panic attacks     Vitamin D deficiency     Paresthesia     Hyperglycemia     Sleep disorder     Mild major depression (H)     Cystic acne     Other specified hypothyroidism     Hyperlipidemia LDL goal <130     Hypertension     Hypothyroid     Other insomnia     Idiopathic neuropathy     BMI 34.0-34.9,adult     Past Surgical History:   Procedure Laterality Date     BREAST SURGERY      breast reduction     COLONOSCOPY N/A 10/19/2017    Procedure: COMBINED COLONOSCOPY, SINGLE OR MULTIPLE BIOPSY/POLYPECTOMY BY BIOPSY;  colonoscopy;  Surgeon: Marquise Haskins MD;  Location:  GI     TONSILLECTOMY  1970       Social History     Tobacco Use     Smoking status: Never Smoker     Smokeless tobacco: Never Used   Substance Use Topics     Alcohol use: Yes     Alcohol/week: 0.0 oz     Comment: 2 x week      Family History   Problem Relation Age of Onset     Diabetes Sister      Thyroid Disease Father      Hypertension Father      Hypertension Mother      Diabetes Maternal Grandmother      Breast Cancer No family hx of      Cancer - colorectal No family hx of      C.A.D. No family hx of            ROS:  Constitutional, HEENT, cardiovascular, pulmonary, GI, , musculoskeletal, neuro, skin, endocrine and psych systems are negative, except as otherwise noted.    OBJECTIVE:     /84   Pulse 83   Temp 97.8  F (36.6  C) (Tympanic)   Ht 1.626 m (5' 4\")   Wt 93 kg (205 lb)   SpO2 96%   BMI " 35.19 kg/m    Body mass index is 35.19 kg/m .  GENERAL: healthy, alert and no distress  NECK: no adenopathy, no asymmetry, masses, or scars and thyroid normal to palpation  RESP: lungs clear to auscultation - no rales, rhonchi or wheezes  CV: regular rate and rhythm, normal S1 S2, no S3 or S4,    ABDOMEN: soft, nontender, no hepatosplenomegaly, no masses and bowel sounds normal  MS: no edema  Rectal with small external hemorrhoids slightly irritated anal folds but no swelling or thrombosis , no acute blood   NEURO: Normal strength and tone, mentation intact and speech normal  PSYCH: mentation appears normal, affect normal / slightly  anxious, fatigued, speech pressured, judgement and insight intact and appearance well groomed        ASSESSMENT/PLAN:     (R53.83) Other fatigue  (primary encounter diagnosis)  Comment:   Plan: CBC with platelets, Comprehensive metabolic         panel, TSH with free T4 reflex, Vitamin D         Deficiency          (E03.8) Other specified hypothyroidism  Comment:   Plan: TSH with free T4 reflex                  (F32.0) Mild major depression (H)  Comment:   Plan: citalopram (CELEXA) 20 MG tablet    (F41.9) Anxiety  Comment:   Plan: citalopram (CELEXA) 20 MG tablet          Discussed cares, talked about signs and symptoms of anxiety/ depression and treatment options. Willing to increase the dose of citalopram to 30 mg. Pros/ cons of med's discussed . encouraged to see  to help. spent sometimes counseling patient. Follow up in 4-6 weeks, sooner if problem.       (I10) Hypertension, goal below 140/90  Comment: need refill   Plan: chlorthalidone (HYGROTON) 25 MG tablet              (K57.30) Diverticulosis of colon  Comment: per colonoscopy 2017  Plan: clinically her sx do not suggest diverticulitis. Need to monitor . F/u if any worsening or problem     (K58.0) Irritable bowel syndrome with diarrhea  Comment: has know hx of IBS   Plan:     (K62.5) Rectal bleeding  Comment:   Plan:  hydrocortisone (ANUSOL-HC) 2.5 % cream            Discussed possible differential diagnosis for her  Symptoms. Most likely  hemorrhoid, causing bleeding bc of IB'S related  Sx. discussed high fibre / fluid diet to regulate Bm.  gave Anusol cream   to help. Talked about warning s/s for which should be seen.   Cares and symptomatic treatment discussed follow up if  recurrent or ongoing problem , consider further evaluation if needed.         Check labs. refill sent.Cares and  treatment discussed follow. up if problem   Patient expressed understanding and agreement with treatment plan. All patient's questions were answered, will let me know if has more later.  Medications: Rx's: Reviewed the potential side effects/complications of medications prescribed.       Ashley Chavez MD  [facility] [place]

## 2019-04-10 NOTE — PATIENT INSTRUCTIONS
Patient Education     Diarrhea with Uncertain Cause (Adult)    Diarrhea is when stools are loose and watery. This can be caused by:    Viral infections    Bacterial infections    Food poisoning    Parasites    Irritable bowel syndrome (IBS)    Inflammatory bowel diseases such as ulcerative colitis, Crohn's disease, and celiac disease    Food intolerance, such as to lactose, the sugar found in milk and milk products    Reaction to medicines like antibiotics, laxatives, cancer drugs, and antacids  Along with diarrhea, you may also have:    Abdominal pain and cramping    Nausea and vomiting    Loss of bowel control    Fever and chills    Bloody stools  In some cases, antibiotics may help to treat diarrhea. You may have a stool sample test. This is done to see what is causing your diarrhea, and if antibiotics will help treat it. The results of a stool sample test may take up to 2 days. The healthcare provider may not give you antibiotics until he or she has the stool test results.  Diarrhea can cause dehydration. This is the loss of too much water and other fluids from the body. When this occurs, body fluid must be replaced. This can be done with oral rehydration solutions. Oral rehydration solutions are available at drugstores and grocery stores without a prescription. Sports drinks are not the best choice if you are very dehydrated. They have too much sugar and not enough electrolytes.  Home care  Follow all instructions given by your healthcare provider. Rest at home for the next 24 hours, or until you feel better. Avoid caffeine, tobacco, and alcohol. These can make diarrhea, cramping, and pain worse.  If taking medicines:    Over-the-counter nausea and diarrhea medicines are generally OK unless you experience fever or blood stool. Check with your doctor first in those circumstances.    You may use acetaminophen or NSAID medicines like ibuprofen or naproxen to reduce pain and fever. Don t use these if you have  chronic liver or kidney disease, or ever had a stomach ulcer or gastrointestinal bleeding. Don't use NSAID medicines if you are already taking one for another condition (like arthritis) or are on daily aspirin therapy (such as for heart disease or after a stroke). Talk with your healthcare provider first.    If antibiotics were prescribed, be sure you take them until they are finished. Don t stop taking them even when you feel better. Antibiotics must be taken as a full course.  To prevent the spread of illness:    Remember that washing with soap and water and using alcohol-based  is the best way to prevent the spread of infection. Dry your hands with a single use towel (like a paper towel).    Clean the toilet after each use.    Wash your hands before eating.    Wash your hands before and after preparing food. Keep in mind that people with diarrhea or vomiting should not prepare food for others.    Wash your hands after using cutting boards, countertops, and knives that have been in contact with raw foods.    Wash and then peel fruits and vegetables.    Keep uncooked meats away from cooked and ready-to-eat foods.    Use a food thermometer when cooking. Cook poultry to at least 165 F (74 C). Cook ground meat (beef, veal, pork, lamb) to at least 160 F (71 C). Cook fresh beef, veal, lamb, and pork to at least 145 F (63 C).    Don t eat raw or undercooked eggs (poached or rudy side up), poultry, meat, or unpasteurized milk and juices.  Food and drinks  The main goal while treating vomiting or diarrhea is to prevent dehydration. This is done by taking small amounts of liquids often.    Keep in mind that liquids are more important than food right now.    Drink only small amounts of liquids at a time.    Don t force yourself to eat, especially if you are having cramping, vomiting, or diarrhea. Don t eat large amounts at a time, even if you are hungry.    If you eat, avoid fatty, greasy, spicy, or fried foods.     Don t eat dairy foods or drink milk if you have diarrhea. These can make diarrhea worse.  During the first 24 hours you can try:    Oral rehydration solutions.  Sports drinks may be used if you are not too dehydrated and are otherwise healthy.    Soft drinks without caffeine    Ginger ale    Water (plain or flavored)    Decaf tea or coffee    Clear broth, consommé, or bouillon    Gelatin, popsicles, or frozen fruit juice bars  The second 24 hours, if you are feeling better, you can add:    Hot cereal, plain toast, bread, rolls, or crackers    Plain noodles, rice, mashed potatoes, chicken noodle soup, or rice soup    Unsweetened canned fruit (no pineapple)    Bananas  As you recover:    Limit fat intake to less than 15 grams per day. Don t eat margarine, butter, oils, mayonnaise, sauces, gravies, fried foods, peanut butter, meat, poultry, or fish.    Limit fiber. Don t eat raw or cooked vegetables, fresh fruits except bananas, or bran cereals.    Limit caffeine and chocolate.    Limit dairy.    Don t use spices or seasonings except salt.    Go back to your normal diet over time, as you feel better and your symptoms improve.    If the symptoms come back, go back to a simple diet or clear liquids.  Follow-up care  Follow up with your healthcare provider, or as advised. If a stool sample was taken or cultures were done, call the healthcare provider for the results as instructed.  Call 911  Call 911 if you have any of these symptoms:    Trouble breathing    Confusion    Extreme drowsiness or trouble walking    Loss of consciousness    Rapid heart rate    Chest pain    Stiff neck    Seizure   When to seek medical advice  Call your healthcare provider right away if any of these occur:    Abdominal pain that gets worse    Constant lower right abdominal pain    Continued vomiting and inability to keep liquids down    Diarrhea more than 5 times a day    Blood in vomit or stool    Dark urine or no urine for 8 hours, dry mouth  and tongue, tiredness, weakness, or dizziness    Drowsiness    New rash    You don t get better in 2 to 3 days    Fever of 100.4 F (38 C) or higher, or as directed by your healthcare provider  Date Last Reviewed: 6/1/2018 2000-2018 [employer]. [address] [place] [id number]. All rights reserved. This information is not intended as a substitute for professional medical care. Always follow your healthcare professional's instructions.

## 2019-04-11 LAB — DEPRECATED CALCIDIOL+CALCIFEROL SERPL-MC: 29 UG/L (ref 20–75)

## 2019-04-11 ASSESSMENT — ANXIETY QUESTIONNAIRES: GAD7 TOTAL SCORE: 13

## 2019-05-13 DIAGNOSIS — I10 HYPERTENSION, GOAL BELOW 140/90: ICD-10-CM

## 2019-05-13 RX ORDER — CHLORTHALIDONE 25 MG/1
25 TABLET ORAL DAILY
Qty: 30 TABLET | Refills: 10 | Status: SHIPPED | OUTPATIENT
Start: 2019-05-13 | End: 2020-04-08

## 2019-05-13 NOTE — TELEPHONE ENCOUNTER
"Requested Prescriptions   Pending Prescriptions Disp Refills     chlorthalidone (HYGROTON) 25 MG tablet [Pharmacy Med Name: Chlorthalidone Oral Tablet 25 MG] 30 tablet 0     Sig: Take 1 tablet (25 mg) by mouth daily  Last Written Prescription Date:  4/10/19  Last Fill Quantity: 30,  # refills: 0   Last office visit: 4/10/2019 with prescribing provider:  Scott   Future Office Visit:           Diuretics (Including Combos) Protocol Passed - 5/13/2019  8:01 AM        Passed - Blood pressure under 140/90 in past 12 months     BP Readings from Last 3 Encounters:   04/10/19 124/84   09/20/18 128/80   03/29/18 113/79                 Passed - Recent (12 mo) or future (30 days) visit within the authorizing provider's specialty     Patient had office visit in the last 12 months or has a visit in the next 30 days with authorizing provider or within the authorizing provider's specialty.  See \"Patient Info\" tab in inbasket, or \"Choose Columns\" in Meds & Orders section of the refill encounter.              Passed - Medication is active on med list        Passed - Patient is age 18 or older        Passed - No active pregancy on record        Passed - Normal serum creatinine on file in past 12 months     Recent Labs   Lab Test 04/10/19  0835   CR 0.62              Passed - Normal serum potassium on file in past 12 months     Recent Labs   Lab Test 04/10/19  0835   POTASSIUM 3.8                    Passed - Normal serum sodium on file in past 12 months     Recent Labs   Lab Test 04/10/19  0835                 Passed - No positive pregnancy test in past 12 months          "

## 2019-06-19 DIAGNOSIS — E03.9 HYPOTHYROIDISM, UNSPECIFIED TYPE: ICD-10-CM

## 2019-06-19 RX ORDER — LEVOTHYROXINE SODIUM 112 UG/1
TABLET ORAL
Qty: 90 TABLET | Refills: 3 | Status: SHIPPED | OUTPATIENT
Start: 2019-06-19 | End: 2020-01-09

## 2019-06-19 NOTE — TELEPHONE ENCOUNTER
"Requested Prescriptions   Pending Prescriptions Disp Refills     levothyroxine (SYNTHROID/LEVOTHROID) 112 MCG tablet [Pharmacy Med Name: Levothyroxine Sodium Oral Tablet 112 MCG] 90 tablet 1     Sig: TAKE ONE TABLET BY MOUTH ONE TIME DAILY  Last Written Prescription Date:  3/29/18  Last Fill Quantity: 90 tablet,  # refills: 2   Last office visit: 4/10/2019 with prescribing provider:  Ashley Chavez     Future Office Visit:           Thyroid Protocol Passed - 6/19/2019  8:43 AM        Passed - Patient is 12 years or older        Passed - Recent (12 mo) or future (30 days) visit within the authorizing provider's specialty     Patient had office visit in the last 12 months or has a visit in the next 30 days with authorizing provider or within the authorizing provider's specialty.  See \"Patient Info\" tab in inbasket, or \"Choose Columns\" in Meds & Orders section of the refill encounter.              Passed - Medication is active on med list        Passed - Normal TSH on file in past 12 months     Recent Labs   Lab Test 04/10/19  0835   TSH 1.18              Passed - No active pregnancy on record     If patient is pregnant or has had a positive pregnancy test, please check TSH.          Passed - No positive pregnancy test in past 12 months     If patient is pregnant or has had a positive pregnancy test, please check TSH.            "

## 2019-06-24 DIAGNOSIS — F41.9 ANXIETY: ICD-10-CM

## 2019-06-24 NOTE — TELEPHONE ENCOUNTER
"Requested Prescriptions   Pending Prescriptions Disp Refills     citalopram (CELEXA) 20 MG tablet [Pharmacy Med Name: Citalopram  Last Written Prescription Date:  4-  Last Fill Quantity: 45 tablet,  # refills: 1   Last office visit: 4/10/2019 with prescribing provider:     Future Office Visit:     Hydrobromide Oral Tablet 20 MG] 45 tablet 0     Sig: Take 1.5 tablets (30 mg) by mouth daily       SSRIs Protocol Passed - 6/24/2019  7:00 AM        Passed - Recent (12 mo) or future (30 days) visit within the authorizing provider's specialty     Patient had office visit in the last 12 months or has a visit in the next 30 days with authorizing provider or within the authorizing provider's specialty.  See \"Patient Info\" tab in inbasket, or \"Choose Columns\" in Meds & Orders section of the refill encounter.              Passed - Medication is active on med list        Passed - Patient is age 18 or older        Passed - No active pregnancy on record        Passed - No positive pregnancy test in last 12 months          "

## 2019-06-25 RX ORDER — CITALOPRAM HYDROBROMIDE 20 MG/1
30 TABLET ORAL DAILY
Qty: 45 TABLET | Refills: 0 | Status: SHIPPED | OUTPATIENT
Start: 2019-06-25 | End: 2019-07-23

## 2019-06-25 NOTE — TELEPHONE ENCOUNTER
Medication is being filled for 1 time refill only due to:  Per last office visit on 4/10/19, patient needs to follow up with PCP in 4-6 weeks.  Routing to TC to follow up with patient and schedule an appointment with PCP Chema Chua RN, BSN  [facility] [place]

## 2019-09-02 DIAGNOSIS — F41.9 ANXIETY: ICD-10-CM

## 2019-09-03 NOTE — TELEPHONE ENCOUNTER
"Requested Prescriptions   Pending Prescriptions Disp Refills     citalopram (CELEXA) 20 MG tablet [Pharmacy Med Name: Citalopram Hydrobromide Oral Tablet 20 MG] 45 tablet 0     Sig: Take 1 AND 1/2 tablets (30 mg) by mouth daily       SSRIs Protocol Passed - 9/2/2019  7:01 AM        Passed - Recent (12 mo) or future (30 days) visit within the authorizing provider's specialty     Patient had office visit in the last 12 months or has a visit in the next 30 days with authorizing provider or within the authorizing provider's specialty.  See \"Patient Info\" tab in inbasket, or \"Choose Columns\" in Meds & Orders section of the refill encounter.              Passed - Medication is active on med list        Passed - Patient is age 18 or older        Passed - No active pregnancy on record        Passed - No positive pregnancy test in last 12 months        citalopram (CELEXA) 20 MG tablet 45 tablet 0 7/29/2019       Last Written Prescription Date:  7/29/2019  Last Fill Quantity: 45,  # refills: 0   Last office visit: 4/10/2019 with prescribing provider:  Dr. Chavez   Future Office Visit:  Unknown     "

## 2019-09-04 RX ORDER — CITALOPRAM HYDROBROMIDE 20 MG/1
TABLET ORAL
Qty: 45 TABLET | Refills: 0 | Status: SHIPPED | OUTPATIENT
Start: 2019-09-04 | End: 2019-10-01

## 2019-09-04 NOTE — TELEPHONE ENCOUNTER
Routing refill request to provider for review/approval because:  Patient needs to be seen because:  Per LOV note from 4/10/2019:      Discussed cares, talked about signs and symptoms of anxiety/ depression and treatment options. Willing to increase the dose of citalopram to 30 mg. Pros/ cons of med's discussed . encouraged to see  to help. spent sometimes counseling patient. Follow up in 4-6 weeks, sooner if problem.       TAD MuellerN, RN  [facility]

## 2019-10-01 DIAGNOSIS — F41.9 ANXIETY: ICD-10-CM

## 2019-10-01 NOTE — TELEPHONE ENCOUNTER
"Requested Prescriptions   Pending Prescriptions Disp Refills     citalopram (CELEXA) 20 MG tablet [Pharmacy Med Name: Citalopram Hydrobromide Oral Tablet 20 MG] 45 tablet 0     Sig: TAKE 1 AND 1/2 TABLETS (30 MG) BY MOUTH DAILY- DUE FOR OFFICE VISIT FOR FURTHER REFILLS  Last Written Prescription Date:  9/4/19  Last Fill Quantity: 45,  # refills: 0   Last office visit: 4/10/2019 with prescribing provider:  Scott   Future Office Visit:           SSRIs Protocol Passed - 10/1/2019  7:00 AM        Passed - Recent (12 mo) or future (30 days) visit within the authorizing provider's specialty     Patient has had an office visit with the authorizing provider or a provider within the authorizing providers department within the previous 12 mos or has a future within next 30 days. See \"Patient Info\" tab in inbasket, or \"Choose Columns\" in Meds & Orders section of the refill encounter.         PHQ-9 SCORE 9/20/2018 4/10/2019 7/24/2019   PHQ-9 Total Score - - -   PHQ-9 Total Score MyChart - - -   PHQ-9 Total Score 3 15 6        Passed - Medication is active on med list        Passed - Patient is age 18 or older        Passed - No active pregnancy on record        Passed - No positive pregnancy test in last 12 months          "

## 2019-10-01 NOTE — TELEPHONE ENCOUNTER
Patient is due to follow up with PCP regarding depression. Patient has not followed up since last office visit and was told to follow up  In 4-6 weeks from April 2019. Patient was already given a evelyn period for 1 month supply of medication and has not followed up.       4/10/19 MD Note: Discussed cares, talked about signs and symptoms of anxiety/ depression and treatment options. Willing to increase the dose of citalopram to 30 mg. Pros/ cons of med's discussed . encouraged to see  to help. spent sometimes counseling patient. Follow up in 4-6 weeks, sooner if problem.       Routing to TC to assist with scheduling patient for office visit, once patient is scheduled can address further refills. Please call patient and route back to triage once patient is scheduled. Thank you.    Lovely Chua RN, BSN  [facility] [place]

## 2019-10-01 NOTE — LETTER
October 4, 2019      Taylor Baeza  [address] [place] [address]        Dear aTylor,    I care about your health and have reviewed your health plan. I have reviewed your medical conditions, medication list, and lab results and am making recommendations based on this review, to better manage your health.    You are in particular need of attention regarding:  -Medication    I am recommending that you:  -Schedule an appointment    Here is a list of Health Maintenance topics that are due now or due soon:  Health Maintenance Due   Topic Date Due     Discuss Advance Care Planning  1965     HIV Screening  07/17/1979     Zoster (Shingles) Vaccine (1 of 2) 07/17/2014     Preventive Care Visit  10/31/2018     Flu Vaccine (1) 09/01/2019     Mammogram  10/31/2019       Please call us at [phone] (or use WonderHowTo) to address the above recommendations.     Thank you for trusting [facility] and we appreciate the opportunity to serve you.  We look forward to supporting your healthcare needs in the future.    Healthy Regards,    Ashley Chavez MD

## 2019-10-02 NOTE — TELEPHONE ENCOUNTER
Nicky Murguia contacted Taylor on 10/02/19 and left a message. If patient calls back please schedule appointment as soon as possible for a med check.      .Nicky CARDOZA    [facility] [place]

## 2019-10-03 NOTE — TELEPHONE ENCOUNTER
Nicky Murguia contacted Taylor on 10/03/19 and left a message. If patient calls back please schedule appointment as soon as possible for a med check.        .Nicky CARDOZA    [facility] [place]

## 2019-10-04 NOTE — TELEPHONE ENCOUNTER
Nicky Murguia contacted Taylor on 10/04/19 and left a message. If patient calls back please schedule appointment as soon as possible for a med check. Letter sent.        .Nicky CARDOZA    [facility] [place]

## 2019-10-07 RX ORDER — CITALOPRAM HYDROBROMIDE 20 MG/1
TABLET ORAL
Qty: 45 TABLET | Refills: 0 | Status: SHIPPED | OUTPATIENT
Start: 2019-10-07 | End: 2020-01-09

## 2019-10-31 ENCOUNTER — OFFICE VISIT (OUTPATIENT)
Dept: FAMILY MEDICINE | Facility: CLINIC | Age: 54
End: 2019-10-31
Payer: COMMERCIAL

## 2019-10-31 VITALS
SYSTOLIC BLOOD PRESSURE: 124 MMHG | DIASTOLIC BLOOD PRESSURE: 76 MMHG | HEART RATE: 76 BPM | OXYGEN SATURATION: 96 % | HEIGHT: 64 IN | BODY MASS INDEX: 35.17 KG/M2 | TEMPERATURE: 97.2 F | WEIGHT: 206 LBS | RESPIRATION RATE: 14 BRPM

## 2019-10-31 DIAGNOSIS — F32.0 MILD MAJOR DEPRESSION (H): Primary | ICD-10-CM

## 2019-10-31 DIAGNOSIS — G47.09 OTHER INSOMNIA: ICD-10-CM

## 2019-10-31 DIAGNOSIS — H66.001 ACUTE SUPPURATIVE OTITIS MEDIA OF RIGHT EAR WITHOUT SPONTANEOUS RUPTURE OF TYMPANIC MEMBRANE, RECURRENCE NOT SPECIFIED: ICD-10-CM

## 2019-10-31 DIAGNOSIS — F41.9 ANXIETY: ICD-10-CM

## 2019-10-31 DIAGNOSIS — E03.8 OTHER SPECIFIED HYPOTHYROIDISM: ICD-10-CM

## 2019-10-31 DIAGNOSIS — F41.0 PANIC ATTACKS: ICD-10-CM

## 2019-10-31 PROCEDURE — 99214 OFFICE O/P EST MOD 30 MIN: CPT | Performed by: PHYSICIAN ASSISTANT

## 2019-10-31 RX ORDER — BUPROPION HYDROCHLORIDE 450 MG/1
450 TABLET, FILM COATED, EXTENDED RELEASE ORAL EVERY MORNING
Qty: 90 TABLET | Refills: 1 | Status: SHIPPED | OUTPATIENT
Start: 2019-10-31 | End: 2020-01-09

## 2019-10-31 RX ORDER — AMOXICILLIN 875 MG
875 TABLET ORAL 2 TIMES DAILY
Qty: 20 TABLET | Refills: 0 | Status: SHIPPED | OUTPATIENT
Start: 2019-10-31 | End: 2020-01-09

## 2019-10-31 RX ORDER — CITALOPRAM HYDROBROMIDE 20 MG/1
20 TABLET ORAL DAILY
Qty: 90 TABLET | Refills: 1 | Status: SHIPPED | OUTPATIENT
Start: 2019-10-31 | End: 2020-01-09

## 2019-10-31 ASSESSMENT — ANXIETY QUESTIONNAIRES
3. WORRYING TOO MUCH ABOUT DIFFERENT THINGS: SEVERAL DAYS
GAD7 TOTAL SCORE: 3
5. BEING SO RESTLESS THAT IT IS HARD TO SIT STILL: NOT AT ALL
7. FEELING AFRAID AS IF SOMETHING AWFUL MIGHT HAPPEN: NOT AT ALL
6. BECOMING EASILY ANNOYED OR IRRITABLE: SEVERAL DAYS
1. FEELING NERVOUS, ANXIOUS, OR ON EDGE: NOT AT ALL
2. NOT BEING ABLE TO STOP OR CONTROL WORRYING: SEVERAL DAYS

## 2019-10-31 ASSESSMENT — MIFFLIN-ST. JEOR: SCORE: 1514.41

## 2019-10-31 ASSESSMENT — PATIENT HEALTH QUESTIONNAIRE - PHQ9
SUM OF ALL RESPONSES TO PHQ QUESTIONS 1-9: 8
5. POOR APPETITE OR OVEREATING: NOT AT ALL

## 2019-10-31 NOTE — PROGRESS NOTES
Subjective     Taylor Baeza is a 54 year old female who presents to clinic today for the following health issues:    HPI   Depression and Anxiety Follow-Up    How are you doing with your depression since your last visit? No change    How are you doing with your anxiety since your last visit?  Worsened anxious about weight gaining and eating pattersn    Are you having other symptoms that might be associated with depression or anxiety? No    Have you had a significant life event? No     Do you have any concerns with your use of alcohol or other drugs? No    Social History     Tobacco Use     Smoking status: Never Smoker     Smokeless tobacco: Never Used   Substance Use Topics     Alcohol use: Yes     Alcohol/week: 0.0 standard drinks     Comment: 2 x week      Drug use: No     PHQ 4/10/2019 7/24/2019 10/31/2019   PHQ-9 Total Score 15 6 8   Q9: Thoughts of better off dead/self-harm past 2 weeks Not at all Not at all Not at all     GAVIN-7 SCORE 9/20/2018 4/10/2019 10/31/2019   Total Score - - -   Total Score 3 13 3     Last PHQ-9 10/31/2019   1.  Little interest or pleasure in doing things 0   2.  Feeling down, depressed, or hopeless 1   3.  Trouble falling or staying asleep, or sleeping too much 1   4.  Feeling tired or having little energy 1   5.  Poor appetite or overeating 3   6.  Feeling bad about yourself 1   7.  Trouble concentrating 1   8.  Moving slowly or restless 0   Q9: Thoughts of better off dead/self-harm past 2 weeks 0   PHQ-9 Total Score 8   Difficulty at work, home, or with people -     GAVIN-7  10/31/2019   1. Feeling nervous, anxious, or on edge 0   2. Not being able to stop or control worrying 1   3. Worrying too much about different things 1   4. Trouble relaxing 0   5. Being so restless that it is hard to sit still 0   6. Becoming easily annoyed or irritable 1   7. Feeling afraid, as if something awful might happen 0   GAVIN-7 Total Score 3   If you checked any problems, how difficult have they made  "it for you to do your work, take care of things at home, or get along with other people? -         Suicide Assessment Five-step Evaluation and Treatment (SAFE-T)      How many servings of fruits and vegetables do you eat daily?  4 or more    On average, how many sweetened beverages do you drink each day (soda, juice, sweet tea, etc)?   0    How many days per week do you miss taking your medication? 0        Taylor is currently taking Celexa 20 mg and Wellbutrin  mg daily. These medication have been working well for her anxiety and depression, but over the past several months she has developed some fear of going out to public places and and stopped participating in events outside of her home or work.  She cannot identify a specific trigger or cause of her new symptoms. Her stress at work is manageable and she has not had any new life events  Or stressors.  She feels like the Wellbutrin has helped her mood and she feels like she is eating more because she feels better.  Is having a hard time going to the gym due to fear.    Right ear has felt clogged for 3 week:  right ear discomfort and decreased hearing, plugging sensation x 3 weeks          Reviewed and updated as needed this visit by Provider         Review of Systems   ROS COMP: Constitutional, HEENT, cardiovascular, pulmonary, GI, , musculoskeletal, neuro, skin, endocrine and psych systems are negative, except as otherwise noted.      Objective    /76   Pulse 76   Temp 97.2  F (36.2  C) (Tympanic)   Resp 14   Ht 1.626 m (5' 4\")   Wt 93.4 kg (206 lb)   SpO2 96%   BMI 35.36 kg/m    Body mass index is 35.36 kg/m .  Physical Exam   GENERAL: healthy, alert and no distress  EYES: Eyes grossly normal to inspection, PERRL and conjunctivae and sclerae normal  HENT: right TM is erythematous, bulging, landmarks blunted, nose and mouth without ulcers or lesions  PSYCH: mentation appears normal, affect normal/bright    Diagnostic Test Results:  Labs " reviewed in Epic        Assessment & Plan     1. Mild major depression (H)  Taylor would benefit from seeing a therapist to help with more recent symptoms of agoraphobia and motivation to help with exercise and poor eating habits.  Will also increase Wellbutrin XR as this has been helping her. She will begin 450 mg daily and will follow up in 1-2 months.  - MENTAL HEALTH REFERRAL  - Adult; Outpatient Treatment; Individual/Couples/Family/Group Therapy/Health Psychology; [facility] [place]: [facility] [phone]; We will contact you to schedule the appointment or please call with any questions  - buPROPion HCl ER, XL, 450 MG TB24; Take 450 mg by mouth every morning  Dispense: 90 tablet; Refill: 1  - citalopram (CELEXA) 20 MG tablet; Take 1 tablet (20 mg) by mouth daily  Dispense: 90 tablet; Refill: 1    2. Anxiety  - MENTAL HEALTH REFERRAL  - Adult; Outpatient Treatment; Individual/Couples/Family/Group Therapy/Health Psychology; [facility] [place] [facility]: [facility] [phone]; We will contact you to schedule the appointment or please call with any questions  - buPROPion HCl ER, XL, 450 MG TB24; Take 450 mg by mouth every morning  Dispense: 90 tablet; Refill: 1  - citalopram (CELEXA) 20 MG tablet; Take 1 tablet (20 mg) by mouth daily  Dispense: 90 tablet; Refill: 1    3. Panic attacks  - MENTAL HEALTH REFERRAL  - Adult; Outpatient Treatment; Individual/Couples/Family/Group Therapy/Health Psychology; [facility] [place] [facility] [phone]; We will contact you to schedule the appointment or please call with any questions  - buPROPion HCl ER, XL, 450 MG TB24; Take 450 mg by mouth every morning  Dispense: 90 tablet; Refill: 1  - citalopram (CELEXA) 20 MG tablet; Take 1 tablet (20 mg) by mouth daily  Dispense: 90 tablet; Refill: 1    4. Other specified hypothyroidism  controlled    5. Other insomnia    6. Acute suppurative otitis media of right ear without spontaneous rupture of tympanic membrane, recurrence not  "specified  Right OM on exam today, amoxicillin sent to pharmacy.   - amoxicillin (AMOXIL) 875 MG tablet; Take 1 tablet (875 mg) by mouth 2 times daily for 10 days  Dispense: 20 tablet; Refill: 0     BMI:   Estimated body mass index is 35.36 kg/m  as calculated from the following:    Height as of this encounter: 1.626 m (5' 4\").    Weight as of this encounter: 93.4 kg (206 lb).           Follow up in 2 months    No follow-ups on file.    Samuel Anthony PA-C  [facility] [place]      "

## 2019-11-02 ASSESSMENT — ANXIETY QUESTIONNAIRES: GAD7 TOTAL SCORE: 3

## 2019-11-08 ENCOUNTER — HEALTH MAINTENANCE LETTER (OUTPATIENT)
Age: 54
End: 2019-11-08

## 2019-11-18 ENCOUNTER — TELEPHONE (OUTPATIENT)
Dept: FAMILY MEDICINE | Facility: CLINIC | Age: 54
End: 2019-11-18

## 2019-11-18 ENCOUNTER — OFFICE VISIT (OUTPATIENT)
Dept: FAMILY MEDICINE | Facility: CLINIC | Age: 54
End: 2019-11-18
Payer: COMMERCIAL

## 2019-11-18 VITALS
HEART RATE: 70 BPM | BODY MASS INDEX: 35.36 KG/M2 | OXYGEN SATURATION: 96 % | SYSTOLIC BLOOD PRESSURE: 120 MMHG | WEIGHT: 206 LBS | TEMPERATURE: 98.1 F | DIASTOLIC BLOOD PRESSURE: 80 MMHG

## 2019-11-18 DIAGNOSIS — H60.541 DERMATITIS OF EAR CANAL, RIGHT: Primary | ICD-10-CM

## 2019-11-18 PROCEDURE — 99213 OFFICE O/P EST LOW 20 MIN: CPT | Performed by: INTERNAL MEDICINE

## 2019-11-18 RX ORDER — NEOMYCIN SULFATE, POLYMYXIN B SULFATE, HYDROCORTISONE 3.5; 10000; 1 MG/ML; [USP'U]/ML; MG/ML
3 SOLUTION/ DROPS AURICULAR (OTIC) 4 TIMES DAILY
Qty: 5 ML | Refills: 0 | Status: SHIPPED | OUTPATIENT
Start: 2019-11-18 | End: 2019-12-05

## 2019-11-18 NOTE — PROGRESS NOTES
Subjective     Taylor Baeza is a 54 year old female who presents to clinic today for the following health issues:    HPI    Followup of  Ear infection.    Pt states that her right ear is getting better. It can be itchy and through out the day and when wake up will notice flakes or stuff coming out of her.        Taylor is here with right ear complaint.  Was seen 2-1/2 weeks ago and diagnosed with a right sided ear infection.  She was treated with a course of amoxicillin.  The acute ear infection symptoms have resolved.  But her ear is still feeling plugged, it feels itchy and irritated and a little bit sore, there is some drainage, ear feels wet especially when she wakes up in the morning.  She does have a history of intermittent ear itching for which she has a prescription for fluocinonide otic oil.  She has been just putting this on a Q-tip and dabbing it on the outside of the ear, has not wanted to put in the ear since she is not sure what is going on.  Her hearing is decreased as well.        Reviewed and updated as needed this visit by Provider         Review of Systems   HEENT reviewed,  otherwise negative unless noted above.        Objective    /80 (BP Location: Right arm, Patient Position: Sitting, Cuff Size: Adult Large)   Pulse 70   Temp 98.1  F (36.7  C) (Tympanic)   Wt 93.4 kg (206 lb)   SpO2 96%   BMI 35.36 kg/m    Body mass index is 35.36 kg/m .  Physical Exam   Gen: pleasant, well appearing woman, no distress  HEENT: L ear canal and TM normal. R ear canal is swollen but not particularly red, no exudates or discharge, TM appears dark with some scarring? Difficult to get a good view.             Assessment & Plan     1. Dermatitis of ear canal, right  Recommended a week of ear drops with steroid and antibiotic ointment.  Follow up with ENT in a couple weeks to re-examine ear and canal.    - neomycin-polymyxin-hydrocortisone (CORTISPORIN) 3.5-44212-7 otic solution; Place 3 drops into the right  ear 4 times daily for 7 days  Dispense: 5 mL; Refill: 0  - OTOLARYNGOLOGY REFERRAL         Return in about 2 weeks (around 12/2/2019) for with ENT.    Mercy Muñoz MD  [facility] [place]

## 2019-11-18 NOTE — TELEPHONE ENCOUNTER
11/18/2019    Call Regarding Preventive Health Screening Mammogram       Attempt 1    Message on voicemail      Outreach   DANIEL

## 2019-12-02 ENCOUNTER — TELEPHONE (OUTPATIENT)
Dept: FAMILY MEDICINE | Facility: CLINIC | Age: 54
End: 2019-12-02

## 2019-12-02 NOTE — TELEPHONE ENCOUNTER
Pt is due now to update PHQ9.  clark message sent to pt. Follow up end date 12/9/19.   PHQ-9 SCORE 4/10/2019 7/24/2019 10/31/2019   PHQ-9 Total Score - - -   PHQ-9 Total Score MyChart - - -   PHQ-9 Total Score 15 6 8     Jean CUETO CMA

## 2019-12-04 NOTE — TELEPHONE ENCOUNTER
Left a non-detailed message and call back number [phone].     Lovely Chua RN, BSN  [facility] [place]

## 2019-12-05 ENCOUNTER — MYC REFILL (OUTPATIENT)
Dept: FAMILY MEDICINE | Facility: CLINIC | Age: 54
End: 2019-12-05

## 2019-12-05 DIAGNOSIS — L71.9 ROSACEA: ICD-10-CM

## 2019-12-05 NOTE — TELEPHONE ENCOUNTER
12/5/2019    Call Regarding Preventive Health Screening Mammogram       Attempt 2    Message on voicemail    Comments: ALSO DUE FOR PHYSICAL      Outreach   LIBBY

## 2019-12-05 NOTE — TELEPHONE ENCOUNTER
PHQ-9 SCORE 7/24/2019 10/31/2019 12/5/2019   PHQ-9 Total Score - - -   PHQ-9 Total Score MyChart - - 18 (Moderately severe depression)   PHQ-9 Total Score 6 8 18     Please see updated PHQ9 screening  Routing to PCP to advise.    Lovely Chua RN, BSN  [facility] [place]

## 2019-12-05 NOTE — TELEPHONE ENCOUNTER
"Requested Prescriptions   Pending Prescriptions Disp Refills     metroNIDAZOLE (METROCREAM) 0.75 % external cream 100 g 0     Si application at night for rosacea.   Last Written Prescription Date:  17  Last Fill Quantity: 100g,  # refills: 0   Last office visit: 2019 with prescribing provider:  Wanda   Future Office Visit:        Topical Acne Medications Protocol Passed - 2019  7:10 AM        Passed - Patient is 12 years of age or older        Passed - Recent (12 mo) or future (30 days) visit within the authorizing provider's specialty     Patient has had an office visit with the authorizing provider or a provider within the authorizing providers department within the previous 12 mos or has a future within next 30 days. See \"Patient Info\" tab in inbasket, or \"Choose Columns\" in Meds & Orders section of the refill encounter.              Passed - Medication is active on med list          "

## 2019-12-05 NOTE — TELEPHONE ENCOUNTER
Sent MailWriter message with provider response. Patient currently communicating via MailWriter today.     Lovely Chua RN, BSN  [facility]

## 2019-12-11 NOTE — TELEPHONE ENCOUNTER
12/11/2019    Call Regarding Preventive Health Screening Mammogram and ReattributionPhysical       Attempt 3    Message on voicemail    Outreach   KT

## 2020-01-09 ENCOUNTER — OFFICE VISIT (OUTPATIENT)
Dept: FAMILY MEDICINE | Facility: CLINIC | Age: 55
End: 2020-01-09
Payer: COMMERCIAL

## 2020-01-09 VITALS
WEIGHT: 208 LBS | HEIGHT: 64 IN | HEART RATE: 65 BPM | SYSTOLIC BLOOD PRESSURE: 126 MMHG | DIASTOLIC BLOOD PRESSURE: 80 MMHG | OXYGEN SATURATION: 98 % | BODY MASS INDEX: 35.51 KG/M2 | TEMPERATURE: 98.4 F

## 2020-01-09 DIAGNOSIS — F41.9 ANXIETY: ICD-10-CM

## 2020-01-09 DIAGNOSIS — R53.83 FATIGUE, UNSPECIFIED TYPE: Primary | ICD-10-CM

## 2020-01-09 DIAGNOSIS — F32.0 MILD MAJOR DEPRESSION (H): ICD-10-CM

## 2020-01-09 DIAGNOSIS — E03.9 HYPOTHYROIDISM, UNSPECIFIED TYPE: ICD-10-CM

## 2020-01-09 DIAGNOSIS — F41.0 PANIC ATTACKS: ICD-10-CM

## 2020-01-09 LAB
ERYTHROCYTE [DISTWIDTH] IN BLOOD BY AUTOMATED COUNT: 12.5 % (ref 10–15)
HCT VFR BLD AUTO: 41.4 % (ref 35–47)
HGB BLD-MCNC: 14.1 G/DL (ref 11.7–15.7)
MCH RBC QN AUTO: 31.8 PG (ref 26.5–33)
MCHC RBC AUTO-ENTMCNC: 34.1 G/DL (ref 31.5–36.5)
MCV RBC AUTO: 94 FL (ref 78–100)
PLATELET # BLD AUTO: 352 10E9/L (ref 150–450)
RBC # BLD AUTO: 4.43 10E12/L (ref 3.8–5.2)
WBC # BLD AUTO: 8.1 10E9/L (ref 4–11)

## 2020-01-09 PROCEDURE — 82306 VITAMIN D 25 HYDROXY: CPT | Performed by: FAMILY MEDICINE

## 2020-01-09 PROCEDURE — 99214 OFFICE O/P EST MOD 30 MIN: CPT | Performed by: FAMILY MEDICINE

## 2020-01-09 PROCEDURE — 84443 ASSAY THYROID STIM HORMONE: CPT | Performed by: FAMILY MEDICINE

## 2020-01-09 PROCEDURE — 80053 COMPREHEN METABOLIC PANEL: CPT | Performed by: FAMILY MEDICINE

## 2020-01-09 PROCEDURE — 85027 COMPLETE CBC AUTOMATED: CPT | Performed by: FAMILY MEDICINE

## 2020-01-09 PROCEDURE — 36415 COLL VENOUS BLD VENIPUNCTURE: CPT | Performed by: FAMILY MEDICINE

## 2020-01-09 RX ORDER — CITALOPRAM HYDROBROMIDE 20 MG/1
30 TABLET ORAL DAILY
Qty: 45 TABLET | Refills: 1 | Status: SHIPPED | OUTPATIENT
Start: 2020-01-09 | End: 2020-03-05

## 2020-01-09 RX ORDER — LEVOTHYROXINE SODIUM 112 UG/1
TABLET ORAL
Qty: 90 TABLET | Refills: 3 | Status: SHIPPED | OUTPATIENT
Start: 2020-01-09 | End: 2021-01-27

## 2020-01-09 ASSESSMENT — ANXIETY QUESTIONNAIRES
1. FEELING NERVOUS, ANXIOUS, OR ON EDGE: MORE THAN HALF THE DAYS
1. FEELING NERVOUS, ANXIOUS, OR ON EDGE: MORE THAN HALF THE DAYS
3. WORRYING TOO MUCH ABOUT DIFFERENT THINGS: NEARLY EVERY DAY
GAD7 TOTAL SCORE: 13
7. FEELING AFRAID AS IF SOMETHING AWFUL MIGHT HAPPEN: NOT AT ALL
2. NOT BEING ABLE TO STOP OR CONTROL WORRYING: NEARLY EVERY DAY
GAD7 TOTAL SCORE: 10
7. FEELING AFRAID AS IF SOMETHING AWFUL MIGHT HAPPEN: NOT AT ALL
IF YOU CHECKED OFF ANY PROBLEMS ON THIS QUESTIONNAIRE, HOW DIFFICULT HAVE THESE PROBLEMS MADE IT FOR YOU TO DO YOUR WORK, TAKE CARE OF THINGS AT HOME, OR GET ALONG WITH OTHER PEOPLE: SOMEWHAT DIFFICULT
IF YOU CHECKED OFF ANY PROBLEMS ON THIS QUESTIONNAIRE, HOW DIFFICULT HAVE THESE PROBLEMS MADE IT FOR YOU TO DO YOUR WORK, TAKE CARE OF THINGS AT HOME, OR GET ALONG WITH OTHER PEOPLE: SOMEWHAT DIFFICULT
5. BEING SO RESTLESS THAT IT IS HARD TO SIT STILL: MORE THAN HALF THE DAYS
6. BECOMING EASILY ANNOYED OR IRRITABLE: NOT AT ALL
6. BECOMING EASILY ANNOYED OR IRRITABLE: SEVERAL DAYS
3. WORRYING TOO MUCH ABOUT DIFFERENT THINGS: NEARLY EVERY DAY
2. NOT BEING ABLE TO STOP OR CONTROL WORRYING: NEARLY EVERY DAY
5. BEING SO RESTLESS THAT IT IS HARD TO SIT STILL: SEVERAL DAYS

## 2020-01-09 ASSESSMENT — MIFFLIN-ST. JEOR: SCORE: 1523.48

## 2020-01-09 ASSESSMENT — PATIENT HEALTH QUESTIONNAIRE - PHQ9
5. POOR APPETITE OR OVEREATING: SEVERAL DAYS
SUM OF ALL RESPONSES TO PHQ QUESTIONS 1-9: 14
5. POOR APPETITE OR OVEREATING: MORE THAN HALF THE DAYS

## 2020-01-09 NOTE — PATIENT INSTRUCTIONS
Patient Education     Depression: Tips to Help Yourself    As your healthcare providers help treat your depression, you can also help yourself. Keep in mind that your illness affects you emotionally, physically, mentally, and socially. So full recovery will take time. Take care of your body and your soul, and be patient with yourself as you get better.  Self-care    Educate yourself. Read about treatment and medicine options. If you have the energy, attend local conferences or support groups. Keep a list of useful websites and helpful books and use them as needed. This illness is not your fault. Don t blame yourself for your depression.    Manage early symptoms. If you notice symptoms returning, experience triggers, or identify other factors that may lead to a depressive episode, get help as soon as possible. Ask trusted friends and family to monitor your behavior and let you know if they see anything of concern.    Work with your provider. Find a provider you can trust. Communicate honestly with that person and share information on your treatment for depression and your reaction to medicines.    Be prepared for a crisis. Know what to do if you experience a crisis. Keep the phone number of a crisis hotline and know the location of your community's urgent care centers and the closest emergency department.    Hold off on big decisions. Depression can cloud your judgment. So wait until you feel better before making major life decisions, such as changing jobs, moving, or getting  or .    Be patient. Recovering from depression is a process. Don t be discouraged if it takes some time to feel better.    Keep it simple. Depression saps your energy and concentration. So you won t be able to do all the things you used to do. Set small goals and do what you can.    Be with others. Don t isolate yourself--you ll only feel worse. Try to be with other people. And take part in fun activities when you can. Go to a  movie, ballgame, [employer] service, or social event. Talk openly with people you can trust. And accept help when it s offered.  Take care of your body  People with depression often lose the desire to take care of themselves. That only makes their problems worse. During treatment and afterward, make a point to:    Exercise. It s a great way to take care of your body. And studies have shown that exercise helps fight depression.    Avoid drugs and alcohol. These may ease the pain in the short term. But they ll only make your problems worse in the long run.    Get relief from stress. Ask your healthcare provider for relaxation exercises and techniques to help relieve stress.    Eat right. A balanced and healthy diet helps keep your body healthy.  Date Last Reviewed: 1/1/2017 2000-2019 [employer]. [address] [place] [address], [place] [address]. All rights reserved. This information is not intended as a substitute for professional medical care. Always follow your healthcare professional's instructions.           Patient Education     Weight Management: Healthy Eating  Food is your body s fuel. You can t live without it. The key is to give your body enough nutrients and energy without eating too much. Reading food labels can help you make healthy choices. Also, learn new eating habits to manage your weight. Nutrition labels are being redesigned by the FDA to emphasize the number of calories being consumed as well as the amount of more nutrients, such as added sugars, vitamin D, and potassium.     All the values on the label are based on one serving. The serving size is the average portion. Remember to multiply the values on the label by the number of servings you eat.   Eat less fat  A gram of fat has almost 2.5 times the calories of a gram of protein or carbohydrates. Try to balance your food choices so that only 20% to 35% of your calories comes from total fat. This means an average of 2  to 3  grams of fat  for each 100 calories you eat.  Eat more fiber  High-fiber foods are digested more slowly than low-fiber foods, so you feel full longer. Try to get at least 25 grams of fiber each day for a 2000 calorie diet. Foods high in fiber include:    Vegetables and fruits    Whole-grain or bran breads, pastas, and cereals    Legumes (beans) and peas  As you start to eat more fiber, be sure to drink plenty of water to keep your digestive system working smoothly.  Tips  Do's and don'ts include:     Don t skip meals. This often leads to overeating later on. It s best to spread your eating throughout the day.    Eat a variety of foods, not just a few favorites.    If you find yourself eating when you re not hungry, ask yourself why. Many of us eat when we re bored, stressed, or just to be polite. Listen to your body. If you re not hungry, get busy doing something else instead of eating.    Eat slower, shooting for 20 to 30 minutes for each meal. It takes 20 minutes for your stomach to tell your brain that it s full. Slow eaters tend to eat less and are still satisfied, while fast eaters may tend to be overeaters.     Pay attention to what you eat. Don t read or watch TV during your meal.  Date Last Reviewed: 4/1/2018 2000-2019 [employer]. [address] [place], [place] [id number]. All rights reserved. This information is not intended as a substitute for professional medical care. Always follow your healthcare professional's instructions.           Patient Education     Stress Relief: Relaxation    Focusing the mind helps provide stress relief. Taking 5 to 10 minutes to practice relaxation each day helps you feel more refreshed. The following exercises can be done almost anywhere. Try one or more until you find what works best for you.  Calm your mind  Find a quiet place where you won't be disturbed. Then try the following:    Sit comfortably. Take off your shoes. Turn off your cell phone and pager. Take a few  "deep breaths.    Focus your mind on one peaceful thought, image, or word. Then try to hold that thought for 5 minutes.    When other thoughts enter your mind, relax and refocus. Let the invading thoughts fall away.    When you're done, stand up slowly and stretch your arms over your head. With practice, this exercise can help you feel restored.  Calm your body  With practice, you can use mental cues to tell your body how to feel.    Sit comfortably and clear your mind. A few deep breaths will help.    Mentally focus on your left hand and repeat to yourself, \"My left hand feels warm and heavy.\" Keep doing this until your hand does feel heavier and warmer.    Repeat the exercise using your right hand. Then focus on your arms, legs, and feet until your whole body feels relaxed.    When you're done, stand up slowly and stretch your arms overhead.  Visualization  Visualization is like taking a mental vacation. It frees your mind while keeping your body in a calm state. To get started, picture yourself feeling warm and relaxed. Choose a peaceful setting that appeals to you and fill in the details. If you imagine a tropical beach, listen to the waves on the shore. Feel the sun on your face. Dig your toes in the sand. By using the power of your mind, you can take a soothing break when you need to.  Date Last Reviewed: 1/1/2017 2000-2019 [employer]. [address] [place], [place] [id number]. All rights reserved. This information is not intended as a substitute for professional medical care. Always follow your healthcare professional's instructions.           Patient Education     Progressive Relaxation    Your body needs relaxation to reduce stress and calm the fight-or-flight response. It helps to plan for 20 minutes of relaxation every day when you can take time for yourself. Sit or lie comfortably limiting distractions like phones. Put on some soft music or simply sit in silence.  Then incorporate the " progressive relaxation technique below.  How to do progressive relaxation  Progressive relaxation helps your whole body relax. To try this technique, follow these steps:  1. Find a quiet room. Sit in a comfortable chair or lie on your back.  2. Breathe in deeply to a slow count of 5. Feel your belly, chest, and back expand. Breathe out slowly to a count of 5. Do this for several minutes.  3. After a few minutes, breathe in deeply again, but this time tighten the muscles in your feet. Notice how it feels. Hold the tension for 3 seconds.  4. Breathe out while relaxing the tightened muscles. Notice how relaxed you feel.  5. Repeat steps 3 and 4 with another muscle group. You can move from your feet, calves, and thighs to your stomach, arms, and hands.  Remember the 4 A s    Avoid a stressor. For example, if someone is smoking when you re trying to quit, leave the room.    Accept a stressor you can t change, like a job loss, by knowing that your feelings are normal.    Alter how you deal with a stressor. For example, if a constantly ringing phone is a stressor, let the answering machine .    Adapt to some stressors. For example, when starting a new exercise program, instead of focusing on how hard it will be, think how good you will feel.  Date Last Reviewed: 6/1/2018 2000-2019 [employer]. [address] [place] [address], [place] [address]. All rights reserved. This information is not intended as a substitute for professional medical care. Always follow your healthcare professional's instructions.

## 2020-01-09 NOTE — PROGRESS NOTES
Subjective     Taylor Baeza is a 54 year old female who presents to clinic today for the following health issues:    HPI       Medication Followup of Levothyroxine / anxiety med's     Taking Medication as prescribed: yes    Side Effects:  None    Medication Helping Symptoms:  yes       Hypothyroidism Follow-up      Since last visit, patient describes the following symptoms:  Some ongoing fatigue,  weight gain.  always has some constipation but no recent change paying attention to more fiber etc does help       How many servings of fruits and vegetables do you eat daily?  4 or more    On average, how many sweetened beverages do you drink each day (Examples: soda, juice, sweet tea, etc.  Do NOT count diet or artificially sweetened beverages)?   0    How many days per week do you miss taking your medication? 0        PROBLEMS TO ADD ON..  Medications have helped but feels like still need improvement in symptoms. Unsure if fatigue could be stress related .  Depression and Anxiety Follow-Up    How are you doing with your depression since your last visit? Worsened,  may be Wellbutrin dose not help enough     How are you doing with your anxiety since your last visit?  Worsened slightly more anxious. No bad  panic attack but lexapro has helped with anxiety     Are you having other symptoms that might be associated with depression or anxiety? Yes: See phq-9 and yossi 7    Have you had a significant life event? No     Do you have any concerns with your use of alcohol or other drugs? No    Social History     Tobacco Use     Smoking status: Never Smoker     Smokeless tobacco: Never Used   Substance Use Topics     Alcohol use: Yes     Alcohol/week: 0.0 standard drinks     Comment: 2 x week      Drug use: No     PHQ 10/31/2019 12/5/2019 1/9/2020   PHQ-9 Total Score 8 18 14   Q9: Thoughts of better off dead/self-harm past 2 weeks Not at all Not at all Not at all     YOSSI-7 SCORE 4/10/2019 10/31/2019 1/9/2020   Total Score - - -    Total Score 13 3 13     Last PHQ-9 1/9/2020   1.  Little interest or pleasure in doing things 1   2.  Feeling down, depressed, or hopeless 1   3.  Trouble falling or staying asleep, or sleeping too much 1   4.  Feeling tired or having little energy 3   5.  Poor appetite or overeating 3   6.  Feeling bad about yourself 2   7.  Trouble concentrating 2   8.  Moving slowly or restless 1   Q9: Thoughts of better off dead/self-harm past 2 weeks 0   PHQ-9 Total Score 14   Difficulty at work, home, or with people Somewhat difficult     GAVIN-7  1/9/2020   1. Feeling nervous, anxious, or on edge 2   2. Not being able to stop or control worrying 3   3. Worrying too much about different things 3   4. Trouble relaxing 1   5. Being so restless that it is hard to sit still 1   6. Becoming easily annoyed or irritable 0   7. Feeling afraid, as if something awful might happen 0   GAVIN-7 Total Score 10   If you checked any problems, how difficult have they made it for you to do your work, take care of things at home, or get along with other people? Somewhat difficult         Suicide Assessment Five-step Evaluation and Treatment (SAFE-T)      Patient Active Problem List   Diagnosis     Hypothyroidism     Morbid obesity (H)     Rosacea     CARDIOVASCULAR SCREENING; LDL GOAL LESS THAN 130     Plantar fasciitis     Anxiety     Enthesopathy of ankle and tarsus     Snoring     GERD (gastroesophageal reflux disease)     Hypertension, goal below 140/90     Panic attacks     Vitamin D deficiency     Paresthesia     Hyperglycemia     Sleep disorder     Mild major depression (H)     Cystic acne     Other specified hypothyroidism     Hyperlipidemia LDL goal <130     Hypertension     Hypothyroid     Other insomnia     Idiopathic neuropathy     BMI 34.0-34.9,adult     Diverticulosis of colon     Past Surgical History:   Procedure Laterality Date     BREAST SURGERY      breast reduction     COLONOSCOPY N/A 10/19/2017    Procedure: COMBINED  COLONOSCOPY, SINGLE OR MULTIPLE BIOPSY/POLYPECTOMY BY BIOPSY;  colonoscopy;  Surgeon: Marquise Haskins MD;  Location:  GI     TONSILLECTOMY  1970       Social History     Tobacco Use     Smoking status: Never Smoker     Smokeless tobacco: Never Used   Substance Use Topics     Alcohol use: Yes     Alcohol/week: 0.0 standard drinks     Comment: 2 x week      Family History   Problem Relation Age of Onset     Diabetes Sister      Thyroid Disease Father      Hypertension Father      Hypertension Mother      Diabetes Maternal Grandmother      Breast Cancer No family hx of      Cancer - colorectal No family hx of      C.A.D. No family hx of            Reviewed and updated as needed this visit by Provider         Review of Systems   ROS COMP: Constitutional, HEENT, cardiovascular, pulmonary, GI, , musculoskeletal, neuro, skin, endocrine and psych systems are negative, except as otherwise noted.      Objective    There were no vitals taken for this visit.  There is no height or weight on file to calculate BMI.  Physical Exam   GENERAL: healthy, alert and no distress  NECK: no adenopathy, no asymmetry,  and thyroid normal to palpation  RESP: lungs clear to auscultation - no rales, rhonchi or wheezes  CV: regular rate and rhythm, normal S1 S2, no S3 or S4, no murmur,   ABDOMEN: soft, nontender, no hepatosplenomegaly, no masses and bowel sounds normal  MS: no edema  PSYCH: mentation appears normal, affect slightly uptight and anxious, fatigued, speech pressured, judgement and insight intact and appearance well groomed            Assessment & Plan   (R53.83) Fatigue, unspecified type  (primary encounter diagnosis)  Comment:   Plan: TSH with free T4 reflex, Vitamin D Deficiency,         Comprehensive metabolic panel, CBC with         platelets          Discussed possible differential diagnosis for her symptoms. Mostly worried about thyroid, stress could be contributing to some extent   . Check labs. encouraged exercise  "etc to help.    f/u as needed         (F32.0) Mild major depression (H)  Comment:   Plan: citalopram (CELEXA) 20 MG tablet            (F41.9) Anxiety  Comment:   Plan: citalopram (CELEXA) 20 MG tablet            (F41.0) Panic attacks  Comment:   Plan: citalopram (CELEXA) 20 MG tablet          Discussed cares, talked about signs and symptoms of anxiety/ depression and treatment options. Willing to increase the dose of citalopram to 20 mg. Pros/ cons of med's discussed . encouraged to see  to help and referral given . spent sometimes counseling patient. Follow up in 4-6 weeks,  sooner if problem.       (E03.9) Hypothyroidism, unspecified type  Comment:   Plan: TSH with free T4 reflex, levothyroxine         (SYNTHROID/LEVOTHROID) 112 MCG tablet            (Z68.35) BMI 35.0-35.9,adult  Comment:   Plan: Healthy diet and exercise reviewed. Risks of obesity discussed.  Encourage exercise.         Check labs. Script  sent.Cares and  treatment discussed follow up if problem   Patient expressed understanding and agreement with treatment plan. All patient's questions were answered, will let me know if has more later.  Medications: Rx's: Reviewed the potential side effects/complications of medications prescribed.          BMI:   Estimated body mass index is 35.7 kg/m  as calculated from the following:    Height as of this encounter: 1.626 m (5' 4\").    Weight as of this encounter: 94.3 kg (208 lb).   Weight management plan: Discussed healthy diet and exercise guidelines        Ashley Chavez MD  [facility] [place]    "

## 2020-01-10 DIAGNOSIS — E87.6 HYPOKALEMIA: Primary | ICD-10-CM

## 2020-01-10 LAB
ALBUMIN SERPL-MCNC: 4.4 G/DL (ref 3.4–5)
ALP SERPL-CCNC: 74 U/L (ref 40–150)
ALT SERPL W P-5'-P-CCNC: 29 U/L (ref 0–50)
ANION GAP SERPL CALCULATED.3IONS-SCNC: 7 MMOL/L (ref 3–14)
AST SERPL W P-5'-P-CCNC: 16 U/L (ref 0–45)
BILIRUB SERPL-MCNC: 0.5 MG/DL (ref 0.2–1.3)
BUN SERPL-MCNC: 14 MG/DL (ref 7–30)
CALCIUM SERPL-MCNC: 9.4 MG/DL (ref 8.5–10.1)
CHLORIDE SERPL-SCNC: 98 MMOL/L (ref 94–109)
CO2 SERPL-SCNC: 30 MMOL/L (ref 20–32)
CREAT SERPL-MCNC: 0.74 MG/DL (ref 0.52–1.04)
DEPRECATED CALCIDIOL+CALCIFEROL SERPL-MC: 29 UG/L (ref 20–75)
GFR SERPL CREATININE-BSD FRML MDRD: >90 ML/MIN/{1.73_M2}
GLUCOSE SERPL-MCNC: 84 MG/DL (ref 70–99)
POTASSIUM SERPL-SCNC: 2.9 MMOL/L (ref 3.4–5.3)
PROT SERPL-MCNC: 7.7 G/DL (ref 6.8–8.8)
SODIUM SERPL-SCNC: 135 MMOL/L (ref 133–144)
TSH SERPL DL<=0.005 MIU/L-ACNC: 2.47 MU/L (ref 0.4–4)

## 2020-01-10 RX ORDER — POTASSIUM CHLORIDE 1500 MG/1
20 TABLET, EXTENDED RELEASE ORAL DAILY
Qty: 30 TABLET | Refills: 1 | Status: SHIPPED | OUTPATIENT
Start: 2020-01-10 | End: 2020-03-05

## 2020-01-10 ASSESSMENT — ANXIETY QUESTIONNAIRES: GAD7 TOTAL SCORE: 10

## 2020-02-23 ENCOUNTER — HEALTH MAINTENANCE LETTER (OUTPATIENT)
Age: 55
End: 2020-02-23

## 2020-03-05 DIAGNOSIS — F32.0 MILD MAJOR DEPRESSION (H): ICD-10-CM

## 2020-03-05 DIAGNOSIS — E87.6 HYPOKALEMIA: ICD-10-CM

## 2020-03-05 DIAGNOSIS — F41.0 PANIC ATTACKS: ICD-10-CM

## 2020-03-05 DIAGNOSIS — F41.9 ANXIETY: ICD-10-CM

## 2020-03-05 RX ORDER — CITALOPRAM HYDROBROMIDE 20 MG/1
30 TABLET ORAL DAILY
Qty: 45 TABLET | Refills: 0 | Status: SHIPPED | OUTPATIENT
Start: 2020-03-05 | End: 2020-03-18

## 2020-03-05 RX ORDER — POTASSIUM CHLORIDE 1500 MG/1
TABLET, EXTENDED RELEASE ORAL
Qty: 30 TABLET | Refills: 0 | Status: SHIPPED | OUTPATIENT
Start: 2020-03-05 | End: 2020-06-02

## 2020-03-05 NOTE — TELEPHONE ENCOUNTER
"Requested Prescriptions   Pending Prescriptions Disp Refills     citalopram (CELEXA) 20 MG tablet [Pharmacy Med Name: Citalopram Hydrobromide Oral Tablet 20 MG] 45 tablet 0     Sig: Take 1.5 tablets (30 mg) by mouth daily       SSRIs Protocol Failed - 3/5/2020  7:01 AM        Failed - PHQ-9 score less than 5 in past 6 months     Please review last PHQ-9 score.           Passed - Medication is active on med list        Passed - Patient is age 18 or older        Passed - No active pregnancy on record        Passed - No positive pregnancy test in last 12 months        Passed - Recent (6 mo) or future (30 days) visit within the authorizing provider's specialty     Patient had office visit in the last 6 months or has a visit in the next 30 days with authorizing provider or within the authorizing provider's specialty.  See \"Patient Info\" tab in inbasket, or \"Choose Columns\" in Meds & Orders section of the refill encounter.            Last Written Prescription Date:  1/9/2020  Last Fill Quantity: 45,  # refills: 1   Last office visit: 1/9/2020 with prescribing provider:  1/9/2020   Future Office Visit:    PHQ 12/5/2019 1/9/2020 1/9/2020   PHQ-9 Total Score 18 14 14   Q9: Thoughts of better off dead/self-harm past 2 weeks Not at all Not at all Not at all     GAVIN-7 SCORE 10/31/2019 1/9/2020 1/9/2020   Total Score - - -   Total Score 3 13 10         "

## 2020-03-05 NOTE — TELEPHONE ENCOUNTER
"Requested Prescriptions   Pending Prescriptions Disp Refills     KLOR-CON 20 MEQ CR tablet [Pharmacy Med Name: Klor-Con M20 Oral Tablet Extended Release 20 MEQ] 30 tablet 0     Sig: TAKE ONE TABLET BY MOUTH ONE TIME DAILY       Potassium Supplements Protocol Failed - 3/5/2020  7:01 AM        Failed - Normal serum potassium in past 12 months     Recent Labs   Lab Test 01/09/20  1537   POTASSIUM 2.9*                    Passed - Recent (12 mo) or future (30 days) visit within the authorizing provider's department     Patient has had an office visit with the authorizing provider or a provider within the authorizing providers department within the previous 12 mos or has a future within next 30 days. See \"Patient Info\" tab in inbasket, or \"Choose Columns\" in Meds & Orders section of the refill encounter.              Passed - Medication is active on med list        Passed - Patient is age 18 or older        Last Written Prescription Date:  1/10/2020  Last Fill Quantity: 30,  # refills: 1   Last office visit: 1/9/2020 with prescribing provider:  1/9/2020   Future Office Visit:      "

## 2020-03-05 NOTE — TELEPHONE ENCOUNTER
Routing refill request to provider for review/approval because:  Labs out of range:  K  Jeanette Meyers RN   [facility] - Triage

## 2020-03-10 NOTE — TELEPHONE ENCOUNTER
Nicky Murguia contacted Taylor on 03/10/20 and left a message. If patient calls back please schedule appointment as soon as possible for a mood check.      .Nicky CARDOZA    [facility] [place]

## 2020-03-18 RX ORDER — CITALOPRAM HYDROBROMIDE 20 MG/1
30 TABLET ORAL DAILY
Qty: 45 TABLET | Refills: 2 | Status: SHIPPED | OUTPATIENT
Start: 2020-03-18 | End: 2020-06-02

## 2020-03-18 NOTE — TELEPHONE ENCOUNTER
Routing to team to inform that patient can do e-visit.     Lovely Chua RN, BSN  [facility] [place]

## 2020-03-19 NOTE — TELEPHONE ENCOUNTER
Called and left vm for patient to do Evisit or telephone visit.      .Nicky CARDOZA    [facility] [place]

## 2020-04-07 DIAGNOSIS — I10 HYPERTENSION, GOAL BELOW 140/90: ICD-10-CM

## 2020-04-08 RX ORDER — CHLORTHALIDONE 25 MG/1
TABLET ORAL
Qty: 30 TABLET | Refills: 5 | Status: SHIPPED | OUTPATIENT
Start: 2020-04-08 | End: 2020-06-02 | Stop reason: SINTOL

## 2020-04-08 NOTE — TELEPHONE ENCOUNTER
"Requested Prescriptions   Pending Prescriptions Disp Refills     chlorthalidone (HYGROTON) 25 MG tablet [Pharmacy Med Name: Chlorthalidone Oral Tablet 25 MG] 30 tablet 9     Sig: TAKE ONE TABLET BY MOUTH ONE TIME DAILY       Diuretics (Including Combos) Protocol Failed - 4/7/2020  7:00 AM        Failed - Normal serum potassium on file in past 12 months     Recent Labs   Lab Test 01/09/20  1537   POTASSIUM 2.9*                    Passed - Blood pressure under 140/90 in past 12 months     BP Readings from Last 3 Encounters:   01/09/20 126/80   11/18/19 120/80   10/31/19 124/76                 Passed - Recent (12 mo) or future (30 days) visit within the authorizing provider's specialty     Patient has had an office visit with the authorizing provider or a provider within the authorizing providers department within the previous 12 mos or has a future within next 30 days. See \"Patient Info\" tab in inbasket, or \"Choose Columns\" in Meds & Orders section of the refill encounter.              Passed - Medication is active on med list        Passed - Patient is age 18 or older        Passed - No active pregancy on record        Passed - Normal serum creatinine on file in past 12 months     Recent Labs   Lab Test 01/09/20  1537   CR 0.74              Passed - Normal serum sodium on file in past 12 months     Recent Labs   Lab Test 01/09/20  1537                 Passed - No positive pregnancy test in past 12 months             "

## 2020-05-15 ENCOUNTER — MYC REFILL (OUTPATIENT)
Dept: FAMILY MEDICINE | Facility: CLINIC | Age: 55
End: 2020-05-15

## 2020-05-15 DIAGNOSIS — H69.90 DYSFUNCTION OF EUSTACHIAN TUBE, UNSPECIFIED LATERALITY: ICD-10-CM

## 2020-05-18 RX ORDER — FLUOCINOLONE ACETONIDE 0.11 MG/ML
5 OIL AURICULAR (OTIC) 2 TIMES DAILY PRN
Qty: 60 ML | Refills: 5 | OUTPATIENT
Start: 2020-05-18

## 2020-05-18 NOTE — TELEPHONE ENCOUNTER
This hasn't been prescribed in over 2 years. Please have patient submit e-visit, etc to discuss.     Mercy Muñoz MD

## 2020-05-18 NOTE — TELEPHONE ENCOUNTER
Requested Prescriptions   Pending Prescriptions Disp Refills     fluocinolone acetonide 0.01 % OIL 60 mL 5     Sig: Place 5 drops in ear(s) 2 times daily as needed       There is no refill protocol information for this order      fluocinolone acetonide 0.01 % OIL   Last Written Prescription Date:  11/28/17  Last Fill Quantity: 60 ml,  # refills: 5   Last office visit: 1/9/2020 with prescribing provider:    Future Office Visit:

## 2020-05-18 NOTE — TELEPHONE ENCOUNTER
Aramt message sent.    .Nicky CARDOZA    [facility] [place]

## 2020-05-22 ENCOUNTER — E-VISIT (OUTPATIENT)
Dept: FAMILY MEDICINE | Facility: CLINIC | Age: 55
End: 2020-05-22
Payer: COMMERCIAL

## 2020-05-22 DIAGNOSIS — N30.90 BLADDER INFECTION: Primary | ICD-10-CM

## 2020-05-22 PROCEDURE — 99421 OL DIG E/M SVC 5-10 MIN: CPT | Performed by: FAMILY MEDICINE

## 2020-05-22 RX ORDER — NITROFURANTOIN 25; 75 MG/1; MG/1
100 CAPSULE ORAL 2 TIMES DAILY
Qty: 14 CAPSULE | Refills: 0 | Status: SHIPPED | OUTPATIENT
Start: 2020-05-22 | End: 2020-06-02

## 2020-05-22 NOTE — PATIENT INSTRUCTIONS
Urinary Tract Infections in Women    Urinary tract infections (UTIs) are most often caused by bacteria. These bacteria enter the urinary tract. The bacteria may come from outside the body. Or they may travel from the skin outside the rectum or vagina into the urethra. Female anatomy makes it easy for bacteria from the bowel to enter a woman s urinary tract, which is the most common source of UTI. This means women develop UTIs more often than men. Pain in or around the urinary tract is a common UTI symptom. But the only way to know for sure if you have a UTI for the healthcare provider to test your urine. The two tests that may be done are the urinalysis and urine culture.  Types of UTIs    Cystitis. A bladder infection (cystitis) is the most common UTI in women. You may have urgent or frequent urination. You may also have pain, burning when you urinate, and bloody urine.    Urethritis. This is an inflamed urethra, which is the tube that carries urine from the bladder to outside the body. You may have lower stomach or back pain. You may also have urgent or frequent urination.    Pyelonephritis. This is a kidney infection. If not treated, it can be serious and damage your kidneys. In severe cases, you may need to stay in the hospital. You may have a fever and lower back pain.  Medicines to treat a UTI  Most UTIs are treated with antibiotics. These kill the bacteria. The length of time you need to take them depends on the type of infection. It may be as short as 3 days. If you have repeated UTIs, you may need a low-dose antibiotic for several months. Take antibiotics exactly as directed. Don t stop taking them until all of the medicine is gone. If you stop taking the antibiotic too soon, the infection may not go away. You may also develop a resistance to the antibiotic. This can make it much harder to treat.  Lifestyle changes to treat and prevent UTIs  The lifestyle changes below will help get rid of your UTI.  They may also help prevent future UTIs.    Drink plenty of fluids. This includes water, juice, or other caffeine-free drinks. Fluids help flush bacteria out of your body.    Empty your bladder. Always empty your bladder when you feel the urge to urinate. And always urinate before going to sleep. Urine that stays in your bladder can lead to infection. Try to urinate before and after sex as well.    Practice good personal hygiene. Wipe yourself from front to back after using the toilet. This helps keep bacteria from getting into the urethra.    Use condoms during sex. These help prevent UTIs caused by sexually transmitted bacteria. Also don't use spermicides during sex. These can increase the risk for UTIs. Choose other forms of birth control instead. For women who tend to get UTIs after sex, a low-dose of a preventive antibiotic may be used. Be sure to discuss this option with your healthcare provider.    Follow up with your healthcare provider as directed. He or she may test to make sure the infection has cleared. If needed, more treatment may be started.  Date Last Reviewed: 1/1/2017 2000-2019 [employer]. [address] [place] [id number]. All rights reserved. This information is not intended as a substitute for professional medical care. Always follow your healthcare professional's instructions.

## 2020-05-30 ENCOUNTER — E-VISIT (OUTPATIENT)
Dept: FAMILY MEDICINE | Facility: CLINIC | Age: 55
End: 2020-05-30
Payer: COMMERCIAL

## 2020-05-30 DIAGNOSIS — N39.0 RECURRENT URINARY TRACT INFECTION: Primary | ICD-10-CM

## 2020-05-30 PROCEDURE — 99207 ZZC NON-BILLABLE SERV PER CHARTING: CPT | Performed by: FAMILY MEDICINE

## 2020-06-01 ENCOUNTER — TELEPHONE (OUTPATIENT)
Dept: FAMILY MEDICINE | Facility: CLINIC | Age: 55
End: 2020-06-01

## 2020-06-01 NOTE — TELEPHONE ENCOUNTER
RN called patient back. Patient is already scheduled for tomorrow with NO Arik, please see 5/30/2020 encounter. Patient was advised by Dr. Castellanos to be seen in person since symptoms are recurrent. Patient just wanted to confirm appointment for tomorrow. Patient verbalized understanding and agrees with plan.         Lovely Chua RN, BSN  [facility]

## 2020-06-01 NOTE — TELEPHONE ENCOUNTER
Patient calling, still having UTI symptoms. Would like to schedule a F2F visit with Dr. Chavez or female provider. Medication is not helping. Please advise  [phone] (home)   Thank you  Becky Velez

## 2020-06-02 ENCOUNTER — OFFICE VISIT (OUTPATIENT)
Dept: FAMILY MEDICINE | Facility: CLINIC | Age: 55
End: 2020-06-02
Payer: COMMERCIAL

## 2020-06-02 VITALS
WEIGHT: 213 LBS | TEMPERATURE: 97 F | SYSTOLIC BLOOD PRESSURE: 128 MMHG | DIASTOLIC BLOOD PRESSURE: 88 MMHG | BODY MASS INDEX: 36.56 KG/M2 | HEART RATE: 77 BPM | OXYGEN SATURATION: 98 %

## 2020-06-02 DIAGNOSIS — I10 HYPERTENSION, GOAL BELOW 140/90: ICD-10-CM

## 2020-06-02 DIAGNOSIS — N32.81 OVERACTIVE BLADDER: ICD-10-CM

## 2020-06-02 DIAGNOSIS — F32.0 MILD MAJOR DEPRESSION (H): ICD-10-CM

## 2020-06-02 DIAGNOSIS — R30.0 DYSURIA: Primary | ICD-10-CM

## 2020-06-02 DIAGNOSIS — F41.0 PANIC ATTACKS: ICD-10-CM

## 2020-06-02 DIAGNOSIS — F41.9 ANXIETY: ICD-10-CM

## 2020-06-02 LAB
ALBUMIN UR-MCNC: NEGATIVE MG/DL
APPEARANCE UR: CLEAR
BILIRUB UR QL STRIP: NEGATIVE
COLOR UR AUTO: YELLOW
GLUCOSE UR STRIP-MCNC: NEGATIVE MG/DL
HGB UR QL STRIP: NEGATIVE
KETONES UR STRIP-MCNC: NEGATIVE MG/DL
LEUKOCYTE ESTERASE UR QL STRIP: NEGATIVE
NITRATE UR QL: NEGATIVE
PH UR STRIP: 7.5 PH (ref 5–7)
SOURCE: ABNORMAL
SP GR UR STRIP: 1.02 (ref 1–1.03)
UROBILINOGEN UR STRIP-ACNC: 0.2 EU/DL (ref 0.2–1)

## 2020-06-02 PROCEDURE — 81003 URINALYSIS AUTO W/O SCOPE: CPT | Performed by: NURSE PRACTITIONER

## 2020-06-02 PROCEDURE — 99214 OFFICE O/P EST MOD 30 MIN: CPT | Performed by: NURSE PRACTITIONER

## 2020-06-02 RX ORDER — HYDROCHLOROTHIAZIDE 25 MG/1
25 TABLET ORAL DAILY
Qty: 30 TABLET | Refills: 5 | Status: SHIPPED | OUTPATIENT
Start: 2020-06-02 | End: 2020-08-17

## 2020-06-02 RX ORDER — TOLTERODINE 4 MG/1
4 CAPSULE, EXTENDED RELEASE ORAL DAILY
Qty: 30 CAPSULE | Refills: 1 | Status: SHIPPED | OUTPATIENT
Start: 2020-06-02 | End: 2020-08-17

## 2020-06-02 RX ORDER — CITALOPRAM HYDROBROMIDE 20 MG/1
10 TABLET ORAL DAILY
Qty: 45 TABLET | Refills: 2
Start: 2020-06-02 | End: 2020-08-17

## 2020-06-02 NOTE — PATIENT INSTRUCTIONS
1. Stop chlorthalidone. Start hydrochlorothiazide. Return for lab (ordered) in 3-4 weeks to check potassium.     2. Trial of Detrol LA. Let me know if there are issues or if it doesn't work. We can get you to urology.

## 2020-06-02 NOTE — PROGRESS NOTES
Subjective     Taylor Baeza is a 54 year old female who presents to clinic today for the following health issues:      URINARY TRACT SYMPTOMS - F/U   Onset: 2 weeks     Description:   Painful urination (Dysuria): no            Frequency: YES  Blood in urine (Hematuria): no   Delay in urine (Hesitency): YES    Intensity: moderate    Progression of Symptoms:  same    Accompanying Signs & Symptoms:  Fever/chills: no   Flank pain YES- right   Nausea and vomiting: no   Any vaginal symptoms: none   Abdominal/Pelvic Pain: YES    History:   History of frequent UTI's: no   History of kidney stones: no   Sexually Active: no   Possibility of pregnancy: No    Precipitating factors:   Sensitive to dairy, has been consuming more     Therapies Tried and outcome: Macrobid, helped for a few days     HPI: Taylor presents today with the complaint of ongoing urinary symptoms after treatment for UTI. She was treated virtually for this with Macrobid a couple weeks back. Primary symptoms were urinary frequency and hesitancy. No dysuria, fever, hematuria. She notes that she has been struggling progressively with frequency (many small voids per day). 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 No incontinence. No lab findings to suggest that she has diabetes (fasting BG always under 100).     She is on chlorthalidone for HTN. The dose was recently increased to 25 mg daily, and a follow up potassium lab was 2.9. She was asked to replete this with Potassium Chloride tabs daily. She did this but has not refilled this after one month of use.     Patient Active Problem List   Diagnosis     Hypothyroidism     Morbid obesity (H)     Rosacea     CARDIOVASCULAR SCREENING; LDL GOAL LESS THAN 130     Plantar fasciitis     Anxiety     Enthesopathy of ankle and tarsus     Snoring     GERD (gastroesophageal reflux  disease)     Hypertension, goal below 140/90     Panic attacks     Vitamin D deficiency     Paresthesia     Hyperglycemia     Sleep disorder     Mild major depression (H)     Cystic acne     Other specified hypothyroidism     Hyperlipidemia LDL goal <130     Hypertension     Hypothyroid     Other insomnia     Idiopathic neuropathy     BMI 35.0-35.9,adult     Diverticulosis of colon     Past Surgical History:   Procedure Laterality Date     BREAST SURGERY      breast reduction     COLONOSCOPY N/A 10/19/2017    Procedure: COMBINED COLONOSCOPY, SINGLE OR MULTIPLE BIOPSY/POLYPECTOMY BY BIOPSY;  colonoscopy;  Surgeon: Marquise Haskins MD;  Location:  GI     TONSILLECTOMY  1970       Social History     Tobacco Use     Smoking status: Never Smoker     Smokeless tobacco: Never Used   Substance Use Topics     Alcohol use: Yes     Alcohol/week: 0.0 standard drinks     Comment: 2 x week      Family History   Problem Relation Age of Onset     Diabetes Sister      Thyroid Disease Father      Hypertension Father      Hypertension Mother      Diabetes Maternal Grandmother      Breast Cancer No family hx of      Cancer - colorectal No family hx of      C.A.D. No family hx of            Reviewed and updated as needed this visit by Provider  Tobacco  Allergies  Meds  Problems  Med Hx  Surg Hx  Fam Hx         Review of Systems   Constitutional, cardiovascular, , neuro, endocrine systems are negative, except as otherwise noted.      Objective    /88 (BP Location: Left arm, Patient Position: Chair, Cuff Size: Adult Large)   Pulse 77   Temp 97  F (36.1  C) (Tympanic)   Wt 96.6 kg (213 lb)   SpO2 98%   BMI 36.56 kg/m    Body mass index is 36.56 kg/m .  Physical Exam   GENERAL: healthy, alert and no distress  RESP: lungs clear to auscultation - no rales, rhonchi or wheezes  CV: regular rate and rhythm, normal S1 S2, no S3 or S4, no murmur, click or rub, no peripheral edema and peripheral pulses strong  NEURO:  Normal strength and tone, mentation intact and speech normal  BACK: no CVA tenderness, no paralumbar tenderness    Diagnostic Test Results:  Results for orders placed or performed in visit on 06/02/20 (from the past 24 hour(s))   *UA reflex to Microscopic and Culture ([facility] (except [place] and [place])    Specimen: Midstream Urine   Result Value Ref Range    Color Urine Yellow     Appearance Urine Clear     Glucose Urine Negative NEG^Negative mg/dL    Bilirubin Urine Negative NEG^Negative    Ketones Urine Negative NEG^Negative mg/dL    Specific Gravity Urine 1.020 1.003 - 1.035    Blood Urine Negative NEG^Negative    pH Urine 7.5 (H) 5.0 - 7.0 pH    Protein Albumin Urine Negative NEG^Negative mg/dL    Urobilinogen Urine 0.2 0.2 - 1.0 EU/dL    Nitrite Urine Negative NEG^Negative    Leukocyte Esterase Urine Negative NEG^Negative    Source Midstream Urine            Assessment & Plan     Taylor was seen today for urinary problem. UA fails to demonstrate evidence of UTI. Likely a primary urologic issues (either neurogenic or structural). No evidence of DM. I offered to send her to urology, but she would like to have a trial of an agent that treats overactive bladder. I emphasized that using an agent like this could cause bigger problems if there is outflow obstruction, but she wishes to proceed cautiously anyway. I have ordered a 30 day supply of Detrol LA. She will follow up with me if she fails to note positive benefit, at which point I will send her to urology for further workup. She agrees with this approach.     Diagnoses and all orders for this visit:    Dysuria  -     *UA reflex to Microscopic and Culture ([facility] (except [place] and [place])    Hypertension, goal below 140/90  Comment: Because hypokalemia is common with chlorthalidone, I will switch her to hydrochlorothiazide and follow up with a BMP check in 3-4 weeks. Ideally, potassium with be normal and BP will  remain controlled. Discussed risks, benefits, alternatives, potential side effects, and proper administration of new medication / treatment. Agrees with plan of care. All questions answered.   -     hydrochlorothiazide (HYDRODIURIL) 25 MG tablet; Take 1 tablet (25 mg) by mouth daily  -     Basic metabolic panel; Future    Mild major depression (H)  -     citalopram (CELEXA) 20 MG tablet; Take 0.5 tablets (10 mg) by mouth daily    Anxiety  -     citalopram (CELEXA) 20 MG tablet; Take 0.5 tablets (10 mg) by mouth daily    Panic attacks  -     citalopram (CELEXA) 20 MG tablet; Take 0.5 tablets (10 mg) by mouth daily    Overactive bladder  -     tolterodine ER (DETROL LA) 4 MG 24 hr capsule; Take 1 capsule (4 mg) by mouth daily           See Patient Instructions    Return in about 4 weeks (around 6/30/2020) for Lab Work, persistent or worsening symptoms.    Viktor Elise NP  [facility] [place]

## 2020-06-29 ENCOUNTER — MYC MEDICAL ADVICE (OUTPATIENT)
Dept: FAMILY MEDICINE | Facility: CLINIC | Age: 55
End: 2020-06-29

## 2020-06-29 DIAGNOSIS — R53.83 FATIGUE, UNSPECIFIED TYPE: Primary | ICD-10-CM

## 2020-06-29 NOTE — TELEPHONE ENCOUNTER
Please see [employer] message below and advise.   Jeanette Meyers RN   [facility] ([facility] - Triage

## 2020-07-02 DIAGNOSIS — I10 HYPERTENSION, GOAL BELOW 140/90: ICD-10-CM

## 2020-07-02 DIAGNOSIS — R53.83 FATIGUE, UNSPECIFIED TYPE: ICD-10-CM

## 2020-07-02 PROCEDURE — 80048 BASIC METABOLIC PNL TOTAL CA: CPT | Performed by: NURSE PRACTITIONER

## 2020-07-02 PROCEDURE — 84443 ASSAY THYROID STIM HORMONE: CPT | Performed by: NURSE PRACTITIONER

## 2020-07-02 PROCEDURE — 36415 COLL VENOUS BLD VENIPUNCTURE: CPT | Performed by: NURSE PRACTITIONER

## 2020-07-03 LAB
ANION GAP SERPL CALCULATED.3IONS-SCNC: 6 MMOL/L (ref 3–14)
BUN SERPL-MCNC: 22 MG/DL (ref 7–30)
CALCIUM SERPL-MCNC: 9 MG/DL (ref 8.5–10.1)
CHLORIDE SERPL-SCNC: 101 MMOL/L (ref 94–109)
CO2 SERPL-SCNC: 28 MMOL/L (ref 20–32)
CREAT SERPL-MCNC: 0.78 MG/DL (ref 0.52–1.04)
GFR SERPL CREATININE-BSD FRML MDRD: 86 ML/MIN/{1.73_M2}
GLUCOSE SERPL-MCNC: 92 MG/DL (ref 70–99)
POTASSIUM SERPL-SCNC: 3.8 MMOL/L (ref 3.4–5.3)
SODIUM SERPL-SCNC: 135 MMOL/L (ref 133–144)
TSH SERPL DL<=0.005 MIU/L-ACNC: 0.82 MU/L (ref 0.4–4)

## 2020-07-12 ENCOUNTER — MYC REFILL (OUTPATIENT)
Dept: FAMILY MEDICINE | Facility: CLINIC | Age: 55
End: 2020-07-12

## 2020-07-12 DIAGNOSIS — I10 HYPERTENSION, GOAL BELOW 140/90: ICD-10-CM

## 2020-07-12 RX ORDER — HYDROCHLOROTHIAZIDE 25 MG/1
25 TABLET ORAL DAILY
Qty: 30 TABLET | Refills: 5 | Status: CANCELLED | OUTPATIENT
Start: 2020-07-12

## 2020-07-13 NOTE — TELEPHONE ENCOUNTER
Patient has refills remaining at pharmacy.  Sent patient a my chart message to contact pharmacy.  FREDERICK Linder, RN  [employer] Triage

## 2020-07-17 ENCOUNTER — HOSPITAL ENCOUNTER (OUTPATIENT)
Dept: MAMMOGRAPHY | Facility: CLINIC | Age: 55
Discharge: HOME | End: 2020-07-17
Attending: FAMILY MEDICINE | Admitting: FAMILY MEDICINE
Payer: COMMERCIAL

## 2020-07-17 DIAGNOSIS — Z12.31 VISIT FOR SCREENING MAMMOGRAM: ICD-10-CM

## 2020-07-17 PROCEDURE — 77063 BREAST TOMOSYNTHESIS BI: CPT

## 2020-08-17 ENCOUNTER — VIRTUAL VISIT (OUTPATIENT)
Dept: FAMILY MEDICINE | Facility: CLINIC | Age: 55
End: 2020-08-17
Payer: COMMERCIAL

## 2020-08-17 DIAGNOSIS — I10 ESSENTIAL HYPERTENSION: ICD-10-CM

## 2020-08-17 DIAGNOSIS — F41.9 ANXIETY: ICD-10-CM

## 2020-08-17 DIAGNOSIS — F32.0 MILD MAJOR DEPRESSION (H): Primary | ICD-10-CM

## 2020-08-17 DIAGNOSIS — Z13.6 CARDIOVASCULAR SCREENING; LDL GOAL LESS THAN 160: ICD-10-CM

## 2020-08-17 DIAGNOSIS — F41.0 PANIC ATTACKS: ICD-10-CM

## 2020-08-17 DIAGNOSIS — I10 HYPERTENSION, GOAL BELOW 140/90: ICD-10-CM

## 2020-08-17 PROCEDURE — 99214 OFFICE O/P EST MOD 30 MIN: CPT | Mod: TEL | Performed by: FAMILY MEDICINE

## 2020-08-17 RX ORDER — CITALOPRAM HYDROBROMIDE 20 MG/1
10 TABLET ORAL DAILY
Qty: 90 TABLET | Refills: 1 | Status: SHIPPED | OUTPATIENT
Start: 2020-08-17 | End: 2021-03-29

## 2020-08-17 RX ORDER — BUPROPION HYDROCHLORIDE 300 MG/1
300 TABLET ORAL EVERY MORNING
Qty: 90 TABLET | Refills: 1 | Status: SHIPPED | OUTPATIENT
Start: 2020-08-17 | End: 2021-03-29

## 2020-08-17 RX ORDER — BUPROPION HYDROCHLORIDE 150 MG/1
150 TABLET ORAL EVERY MORNING
Qty: 90 TABLET | Refills: 0 | Status: SHIPPED | OUTPATIENT
Start: 2020-08-17 | End: 2020-12-17

## 2020-08-17 RX ORDER — HYDROCHLOROTHIAZIDE 25 MG/1
25 TABLET ORAL DAILY
Qty: 90 TABLET | Refills: 1 | Status: SHIPPED | OUTPATIENT
Start: 2020-08-17 | End: 2021-03-29

## 2020-08-17 ASSESSMENT — PATIENT HEALTH QUESTIONNAIRE - PHQ9: 5. POOR APPETITE OR OVEREATING: NEARLY EVERY DAY

## 2020-08-17 ASSESSMENT — ANXIETY QUESTIONNAIRES
5. BEING SO RESTLESS THAT IT IS HARD TO SIT STILL: NEARLY EVERY DAY
IF YOU CHECKED OFF ANY PROBLEMS ON THIS QUESTIONNAIRE, HOW DIFFICULT HAVE THESE PROBLEMS MADE IT FOR YOU TO DO YOUR WORK, TAKE CARE OF THINGS AT HOME, OR GET ALONG WITH OTHER PEOPLE: VERY DIFFICULT
7. FEELING AFRAID AS IF SOMETHING AWFUL MIGHT HAPPEN: NEARLY EVERY DAY
2. NOT BEING ABLE TO STOP OR CONTROL WORRYING: NEARLY EVERY DAY
3. WORRYING TOO MUCH ABOUT DIFFERENT THINGS: NEARLY EVERY DAY
GAD7 TOTAL SCORE: 21
1. FEELING NERVOUS, ANXIOUS, OR ON EDGE: NEARLY EVERY DAY
6. BECOMING EASILY ANNOYED OR IRRITABLE: NEARLY EVERY DAY

## 2020-08-17 NOTE — PROGRESS NOTES
"Taylor Baeza is a 55 year old female who is being evaluated via a billable telephone visit.      The patient has been notified of following:     \"This telephone visit will be conducted via a call between you and your physician/provider. We have found that certain health care needs can be provided without the need for a physical exam.  This service lets us provide the care you need with a short phone conversation.  If a prescription is necessary we can send it directly to your pharmacy.  If lab work is needed we can place an order for that and you can then stop by our lab to have the test done at a later time.    Telephone visits are billed at different rates depending on your insurance coverage. During this emergency period, for some insurers they may be billed the same as an in-person visit.  Please reach out to your insurance provider with any questions.    If during the course of the call the physician/provider feels a telephone visit is not appropriate, you will not be charged for this service.\"    Patient has given verbal consent for Telephone visit?  Yes, Francesca Yang MA    What phone number would you like to be contacted at? [phone]    How would you like to obtain your AVS? MyChart    Subjective     Taylor Baeza is a 55 year old female who presents via phone visit today for the following health issues:    HPI    Hyperlipidemia Follow-Up      Are you regularly taking any medication or supplement to lower your cholesterol?   No    Are you having muscle aches or other side effects that you think could be caused by your cholesterol lowering medication?  No    Hypertension Follow-up      Do you check your blood pressure regularly outside of the clinic? No     Are you following a low salt diet? Yes    Are your blood pressures ever more than 140 on the top number (systolic) OR more   than 90 on the bottom number (diastolic), for example 140/90? No    Anxiety Follow-Up    How are you doing with your " anxiety since your last visit? Worsened     Are you having other symptoms that might be associated with anxiety? Yes:  not sleeping well at night, lots of headaches, stomach issue  . A bit nervous about school starting and going back to work and wondering if she could get a letter to put any restrictions on her work but comfortable watching.  Although willing to continue same med's     Have you had a significant life event? OTHER: Going back to school and having anxiety     Are you feeling depressed? Yes:  feeling pretty hopeless    Do you have any concerns with your use of alcohol or other drugs? No    Social History     Tobacco Use     Smoking status: Never Smoker     Smokeless tobacco: Never Used   Substance Use Topics     Alcohol use: Yes     Alcohol/week: 0.0 standard drinks     Comment: 2 x week      Drug use: No     GAVIN-7 SCORE 1/9/2020 1/9/2020 5/18/2020   Total Score - - -   Total Score - - 2 (minimal anxiety)   Total Score 13 10 2     PHQ 1/9/2020 1/9/2020 5/18/2020   PHQ-9 Total Score 14 14 2   Q9: Thoughts of better off dead/self-harm past 2 weeks Not at all Not at all Not at all     GAVIN-7  8/17/2020   1. Feeling nervous, anxious, or on edge 3   2. Not being able to stop or control worrying 3   3. Worrying too much about different things 3   4. Trouble relaxing 3   5. Being so restless that it is hard to sit still 3   6. Becoming easily annoyed or irritable 3   7. Feeling afraid, as if something awful might happen 3   GAVIN-7 Total Score 21   If you checked any problems, how difficult have they made it for you to do your work, take care of things at home, or get along with other people? Very difficult         How many servings of fruits and vegetables do you eat daily?  4 or more    On average, how many sweetened beverages do you drink each day (Examples: soda, juice, sweet tea, etc.  Do NOT count diet or artificially sweetened beverages)?   0    How many days per week do you exercise enough to make your  heart beat faster? 3 or less    How many minutes a day do you exercise enough to make your heart beat faster? 20 - 29    How many days per week do you miss taking your medication? 0                Reviewed and updated as needed this visit by Provider         Review of Systems   Constitutional, HEENT, cardiovascular, pulmonary, GI, , musculoskeletal, neuro, skin, endocrine and psych systems are negative, except as otherwise noted.       Objective          Vitals:  No vitals were obtained today due to virtual visit.    healthy, alert and no distress  PSYCH: Alert and oriented times 3; coherent speech, normal   rate and volume, able to articulate logical thoughts, able   to abstract reason, no tangential thoughts, no hallucinations   or delusions  Her affect is normal  RESP: No cough, no audible wheezing, able to talk in full sentences  Remainder of exam unable to be completed due to telephone visits            Assessment/Plan:    Assessment & Plan   (F32.0) Mild major depression (H)  (primary encounter diagnosis)  Comment:   Plan: buPROPion (WELLBUTRIN XL) 150 MG 24 hr tablet,         buPROPion (WELLBUTRIN XL) 300 MG 24 hr tablet,         citalopram (CELEXA) 20 MG tablet, Lipid panel         reflex to direct LDL Fasting            (F41.9) Anxiety  Comment:   Plan: buPROPion (WELLBUTRIN XL) 150 MG 24 hr tablet,         citalopram (CELEXA) 20 MG tablet, Lipid panel         reflex to direct LDL Fasting          Discussed cares, talked about signs and symptoms of anxiety/ depression and treatment options. Willing to continue same med's . A bit nervous about school starting and going back to work but comfortable watching. Pros/ cons of med's discussed . encouraged to see  to help and I declined any letter for work as it is her normal work routine so so some normal anxiety is expected for everyone with current covid situation, so reassurance and observation discussed using standard covid precautions etc  . spent  "sometimes counseling patient. Follow up in 4-6  months, sooner if problem.       (F41.0) Panic attacks  Comment: stable  Plan: buPROPion (WELLBUTRIN XL) 150 MG 24 hr tablet,         citalopram (CELEXA) 20 MG tablet            (Z13.6) CARDIOVASCULAR SCREENING; LDL GOAL LESS THAN 130  Comment:   Plan: Lipid panel reflex to direct LDL Fasting            (I10) Essential hypertension  Comment:   Plan: Lipid panel reflex to direct LDL Fasting            (I10) Hypertension, goal below 140/90  Comment: stable  Plan: hydrochlorothiazide (HYDRODIURIL) 25 MG tablet             Check fasting labs. refill sent.Cares and  treatment discussed follow. up if problem   Patient expressed understanding and agreement with treatment plan. All patient's questions were answered, will let me know if has more later.  Medications: Rx's: Reviewed the potential side effects/complications of medications prescribed.     BMI:   Estimated body mass index is 36.56 kg/m  as calculated from the following:    Height as of 1/9/20: 1.626 m (5' 4\").    Weight as of 6/2/20: 96.6 kg (213 lb).   Weight management plan: Discussed healthy diet and exercise guidelines      Ashley Chavez MD  [facility] [place]    Phone call duration:  20  minutes                "

## 2020-08-18 ASSESSMENT — ANXIETY QUESTIONNAIRES: GAD7 TOTAL SCORE: 21

## 2020-10-07 ENCOUNTER — VIRTUAL VISIT (OUTPATIENT)
Dept: FAMILY MEDICINE | Facility: CLINIC | Age: 55
End: 2020-10-07
Payer: COMMERCIAL

## 2020-10-07 DIAGNOSIS — G47.09 OTHER INSOMNIA: Primary | ICD-10-CM

## 2020-10-07 DIAGNOSIS — K21.9 GASTROESOPHAGEAL REFLUX DISEASE, UNSPECIFIED WHETHER ESOPHAGITIS PRESENT: ICD-10-CM

## 2020-10-07 DIAGNOSIS — F41.0 PANIC ATTACKS: ICD-10-CM

## 2020-10-07 DIAGNOSIS — F43.21 GRIEF REACTION: ICD-10-CM

## 2020-10-07 DIAGNOSIS — F32.0 MILD MAJOR DEPRESSION (H): ICD-10-CM

## 2020-10-07 DIAGNOSIS — F41.9 ANXIETY: ICD-10-CM

## 2020-10-07 PROCEDURE — 99214 OFFICE O/P EST MOD 30 MIN: CPT | Mod: GT | Performed by: FAMILY MEDICINE

## 2020-10-07 RX ORDER — OMEPRAZOLE 40 MG/1
40 CAPSULE, DELAYED RELEASE ORAL DAILY
Qty: 30 CAPSULE | Refills: 0 | Status: SHIPPED | OUTPATIENT
Start: 2020-10-07 | End: 2021-06-01

## 2020-10-07 RX ORDER — TRAZODONE HYDROCHLORIDE 50 MG/1
50 TABLET, FILM COATED ORAL AT BEDTIME
Qty: 30 TABLET | Refills: 0 | Status: SHIPPED | OUTPATIENT
Start: 2020-10-07 | End: 2021-10-19

## 2020-10-07 RX ORDER — ALPRAZOLAM 0.25 MG
0.25 TABLET ORAL 3 TIMES DAILY PRN
Qty: 14 TABLET | Refills: 0 | Status: SHIPPED | OUTPATIENT
Start: 2020-10-07 | End: 2021-06-01

## 2020-10-07 ASSESSMENT — PATIENT HEALTH QUESTIONNAIRE - PHQ9: SUM OF ALL RESPONSES TO PHQ QUESTIONS 1-9: 6

## 2020-10-07 NOTE — PATIENT INSTRUCTIONS
Patient Education     GERD (Adult)    The esophagus is a tube that carries food from the mouth to the stomach. A valve (the LES, lower esophageal sphincter) at the lower end of the esophagus prevents stomach acid from flowing upward. When this valve doesn't work properly, stomach contents may repeatedly flow back up (reflux) into the esophagus. This is called gastroesophageal reflux disease (GERD). GERD can irritate the esophagus. It can cause problems with pain, swallowing or breathing. In severe cases, GERD can cause recurrent pneumonia (from aspiration or breathing in particles) or other serious problems.  Symptoms of reflux include burning, pressure or sharp pain in the upper abdomen or mid to lower chest. The pain can spread to the neck, back, or shoulder. There may be belching, an acid taste in the back of the throat, chronic cough, or sore throat, or hoarseness. GERD symptoms often occur during the day after a big meal. They can also occur at night when lying down.   Home care  Lifestyle changes can help reduce symptoms. If needed, your healthcare provider may prescribe medicines. Symptoms often improve with treatment, but if treatment is stopped, the symptoms often return after a few months. So most persons with GERD will need to continue treatment or get treatment on and off.  Lifestyle changes    Limit or avoid fatty, fried, and spicy foods, as well as coffee, chocolate, mint, and foods with high acid content such as tomatoes and citrus fruit and juices (orange, grapefruit, lemon).    Don t eat large meals, especially at night. Frequent, smaller meals are best. Don't lie down right after eating. And don t eat anything 3 hours before going to bed.    Don't drink alcohol or smoke. As much as possible, stay away from second hand smoke.    If you are overweight, losing weight will reduce symptoms.     Don't wear tight clothing around your stomach area.    If your symptoms occur during sleep, use a foam wedge  "to elevate your upper body (not just your head.) Or, place 4\" blocks under the head of your bed. Or use 2 bed risers under your bedframe.  Medicines  If needed, medicines can help relieve the symptoms of GERD and prevent damage to the esophagus. Discuss a medicine plan with your healthcare provider. This may include one or more of the following medicines:    Antacids to help neutralize the normal acids in your stomach.    Acid blockers (Histamine or H2 blockers) to decrease acid production.    Acid inhibitors (proton pump inhibitors PPIs) to decrease acid production in a different way than the blockers. They may work better, but can take a little longer to take effect.  Take an antacid 30 to 60 minutes after eating and at bedtime, but not at the same time as an acid blocker.  Try not to take medicines such as ibuprofen and aspirin. If you are taking aspirin for your heart or other medical reasons, talk to your healthcare provider about stopping it.  Follow-up care  Follow up with your healthcare provider or as advised by our staff.  When to seek medical advice  Call your healthcare provider if any of the following occur:    Stomach pain gets worse or moves to the lower right abdomen (appendix area)    Chest pain appears or gets worse, or spreads to the back, neck, shoulder, or arm    An over-the-counter trial of medicine doesn't relieve your symptoms    Weight loss that can't be explained    Trouble or pain swallowing    Frequent vomiting (can t keep down liquids)    Blood in the stool or vomit (red or black in color)    Feeling weak or dizzy    Fever of 100.4 F (38 C) or higher, or as directed by your healthcare provider  Date Last Reviewed: 3/1/2018    2492-4603 [employer]. [address] [place] [address], [place] [address]. All rights reserved. This information is not intended as a substitute for professional medical care. Always follow your healthcare professional's instructions.         Take medications " as directed.  Prescription faxed   Cares and symptomatic cares discussed   Follow up  In 4-6 weeks, sooner if problem or concern

## 2020-10-07 NOTE — PROGRESS NOTES
"Taylor Baeza is a 55 year old female who is being evaluated via a billable video visit.      The patient has been notified of following:     \"This video visit will be conducted via a call between you and your physician/provider. We have found that certain health care needs can be provided without the need for an in-person physical exam.  This service lets us provide the care you need with a video conversation.  If a prescription is necessary we can send it directly to your pharmacy.  If lab work is needed we can place an order for that and you can then stop by our lab to have the test done at a later time.    Video visits are billed at different rates depending on your insurance coverage.  Please reach out to your insurance provider with any questions.    If during the course of the call the physician/provider feels a video visit is not appropriate, you will not be charged for this service.\"    Patient has given verbal consent for Video visit? Yes  How would you like to obtain your AVS? MyChart  If you are dropped from the video visit, the video invite should be resent to: Text to cell phone: [phone]  Will anyone else be joining your video visit? No      Subjective     Taylor Baeza is a 55 year old female who presents today via video visit for the following health issues:    HPI     GERD/Heartburn    Onset/Duration: 9/24 - increased stress with family deaths and job  Description: burning in center of chest / epigastric mid chest area , worse at night, burping belching and Tums does help   Intensity: moderate  Progression of Symptoms: worsening and waxing and waning  Accompanying Signs & Symptoms:  Does it feel like food gets stuck or trouble swallowing: no  Nausea: no  Vomiting (bloody?): no  Abdominal Pain: no  Black-Tarry stools: no  Bloody stools: no  History:  Previous similar episodes: no  Previous ulcers: no  Precipitating factors:   Caffeine use: no  Alcohol use: no  NSAID/Aspirin use: Advil daily " for the past few weeks , could have caused stomach upset. Has josie stressed bc of brother in law passed away with covid , also dad passed away recently as well unexpectedly   Tobacco use: no  Worse with no particular food or drink.  Alleviating factors: None  Therapies tried and outcome:             Lifestyle changes: None            Medications: TUMS      Video Start Time: 10:10  AM    Depression and Anxiety Follow-Up    How are you doing with your depression since your last visit? No change    How are you doing with your anxiety since your last visit?  Worsened more anxious,  sometimes hard to relax and feels like having  anxiety attacks lately,few times .     So could use  something to help .     Are you having other symptoms that might be associated with depression or anxiety? Yes , more acid reflux sx lately     Have you had a significant life event? Grief or Loss and OTHER: as per hpi  father passed away last month,  also lost brother in law with  of covid .     Also back to school so somewhat stressed but things are different and different work routine      Do you have any concerns with your use of alcohol or other drugs? No    Social History     Tobacco Use     Smoking status: Never Smoker     Smokeless tobacco: Never Used   Substance Use Topics     Alcohol use: Yes     Alcohol/week: 0.0 standard drinks     Comment: 2 x week      Drug use: No     PHQ 1/9/2020 5/18/2020 10/7/2020   PHQ-9 Total Score 14 2 6   Q9: Thoughts of better off dead/self-harm past 2 weeks Not at all Not at all Not at all     GAVIN-7 SCORE 1/9/2020 5/18/2020 8/17/2020   Total Score - - -   Total Score - 2 (minimal anxiety) -   Total Score 10 2 21     Last PHQ-9 10/7/2020   1.  Little interest or pleasure in doing things 0   2.  Feeling down, depressed, or hopeless 3   3.  Trouble falling or staying asleep, or sleeping too much 3   4.  Feeling tired or having little energy 0   5.  Poor appetite or overeating 0   6.  Feeling bad about  yourself 0   7.  Trouble concentrating 0   8.  Moving slowly or restless 0   Q9: Thoughts of better off dead/self-harm past 2 weeks 0   PHQ-9 Total Score 6   Difficulty at work, home, or with people -     GAVIN-7  8/17/2020   1. Feeling nervous, anxious, or on edge 3   2. Not being able to stop or control worrying 3   3. Worrying too much about different things 3   4. Trouble relaxing 3   5. Being so restless that it is hard to sit still 3   6. Becoming easily annoyed or irritable 3   7. Feeling afraid, as if something awful might happen 3   GAVIN-7 Total Score 21   If you checked any problems, how difficult have they made it for you to do your work, take care of things at home, or get along with other people? Very difficult       Suicide Assessment Five-step Evaluation and Treatment (SAFE-T)            Review of Systems   Constitutional, HEENT, cardiovascular, pulmonary, GI, , musculoskeletal, neuro, skin, endocrine and psych systems are negative, except as otherwise noted.      Objective           Vitals:  No vitals were obtained today due to virtual visit.    Physical Exam     GENERAL: Healthy, alert and no distress  EYES: Eyes grossly normal to inspection.  No discharge or erythema, or obvious scleral/conjunctival abnormalities.  RESP: No audible wheeze, cough, or visible cyanosis.  No visible retractions or increased work of breathing.    SKIN: Visible skin clear. No significant rash, abnormal pigmentation or lesions.  NEURO: Cranial nerves grossly intact.  Mentation and speech appropriate for age.  PSYCH: mentation appears normal, affect normal/bright, speech pressured, judgement and insight intact and appearance well groomed              Assessment & Plan          (K21.9) Gastroesophageal reflux disease, unspecified whether esophagitis present  Comment:   Plan: omeprazole (PRILOSEC) 40 MG DR capsule        Discussed possible differential diagnosis for her symptoms. Sounds like possible GERD also has some stress  and  anxiety lately could be contributing to her sx.   also talked about  causes of recurrent heart burn etc. She is comfortable watching .    also she is willing to try Prilosec 40 mg to see if helps with sx talked about reflux precautions etc . Suggest follow up in 4-6 week, sooner if problem. Consider  further evaluation if needed.       (F41.0) Panic attacks  Comment:   Plan: ALPRAZolam (XANAX) 0.25 MG tablet, MENTAL         HEALTH REFERRAL  - Adult; Outpatient Treatment;        Individual/Couples/Family/Group Therapy/Health         Psychology; [facility]: [facility]         [phone]; We will contact you to schedule        the appointment or please call with any         questions           (G47.09) Other insomnia  (primary encounter diagnosis)  Comment:   Plan: traZODone (DESYREL) 50 MG tablet           (F32.0) Mild major depression (H)  Comment:  Plan: MENTAL HEALTH REFERRAL  - Adult; Outpatient         Treatment; Individual/Couples/Family/Group         Therapy/Health Psychology; [facility]: [facility] [phone]; We will         contact you to schedule the appointment or         please call with any questions            (F41.9) Anxiety  Comment:   Plan: MENTAL HEALTH REFERRAL  - Adult; Outpatient         Treatment; Individual/Couples/Family/Group         Therapy/Health Psychology; [facility]: [facility] [phone]; We will         contact you to schedule the appointment or         please call with any questions           Discussed cares, talked about signs and symptoms of anxiety/ depression and treatment options. Willing to continue same med's for now.  gave few xanax to take as needed for panic attack . Pros/ cons of med's discussed . encouraged to see  to help and referral given . spent sometimes counseling patient. Follow up in 4-6 weeks, sooner if problem.       Script sent.Cares and  treatment discussed.  follow up if problem   Patient  expressed understanding and agreement with treatment plan. All patient's questions were answered, will let me know if has more later.  Medications: Rx's: Reviewed the potential side effects/complications of medications prescribed.       Return in about 6 weeks (around 11/18/2020) for sooner if problem .    Ashley Chavez MD  [facility] [place]      Video-Visit Details    Type of service:  Video Visit    Video End Time:10:43 AM    Originating Location (pt. Location): Home    Distant Location (provider location):  [facility] [place]     Platform used for Video Visit: [employer]

## 2020-12-06 ENCOUNTER — HEALTH MAINTENANCE LETTER (OUTPATIENT)
Age: 55
End: 2020-12-06

## 2020-12-15 DIAGNOSIS — F41.9 ANXIETY: ICD-10-CM

## 2020-12-15 DIAGNOSIS — F32.0 MILD MAJOR DEPRESSION (H): ICD-10-CM

## 2020-12-15 DIAGNOSIS — F41.0 PANIC ATTACKS: ICD-10-CM

## 2020-12-15 NOTE — TELEPHONE ENCOUNTER
Routing refill request to provider for review/approval because:  Labs out of range:  phq9    TAD MuellerN, RN  [employer] Triage

## 2020-12-17 RX ORDER — BUPROPION HYDROCHLORIDE 150 MG/1
150 TABLET ORAL EVERY MORNING
Qty: 90 TABLET | Refills: 0 | Status: SHIPPED | OUTPATIENT
Start: 2020-12-17 | End: 2021-03-29

## 2020-12-17 NOTE — TELEPHONE ENCOUNTER
Please have pt update PHQ-9 just to make sure she is stable on this dose ( if out of range should schedule follow up visit ,Virtual/ Video visit ok

## 2020-12-18 ENCOUNTER — MYC MEDICAL ADVICE (OUTPATIENT)
Dept: FAMILY MEDICINE | Facility: CLINIC | Age: 55
End: 2020-12-18

## 2020-12-19 ASSESSMENT — PATIENT HEALTH QUESTIONNAIRE - PHQ9
10. IF YOU CHECKED OFF ANY PROBLEMS, HOW DIFFICULT HAVE THESE PROBLEMS MADE IT FOR YOU TO DO YOUR WORK, TAKE CARE OF THINGS AT HOME, OR GET ALONG WITH OTHER PEOPLE: EXTREMELY DIFFICULT
SUM OF ALL RESPONSES TO PHQ QUESTIONS 1-9: 18
SUM OF ALL RESPONSES TO PHQ QUESTIONS 1-9: 18

## 2020-12-28 ASSESSMENT — PATIENT HEALTH QUESTIONNAIRE - PHQ9: SUM OF ALL RESPONSES TO PHQ QUESTIONS 1-9: 18

## 2020-12-28 NOTE — TELEPHONE ENCOUNTER
Her sx are worse, then before, need improvement and may be adjustment of med's, so should schedule a follow up visit   Virtual/ Video visit ok

## 2021-01-27 DIAGNOSIS — E03.9 HYPOTHYROIDISM, UNSPECIFIED TYPE: ICD-10-CM

## 2021-01-27 RX ORDER — LEVOTHYROXINE SODIUM 112 UG/1
TABLET ORAL
Qty: 90 TABLET | Refills: 1 | Status: SHIPPED | OUTPATIENT
Start: 2021-01-27 | End: 2021-03-29

## 2021-01-27 NOTE — TELEPHONE ENCOUNTER
Prescription approved per [facility].  Nirmala AARON RN  EP Triage

## 2021-03-29 ENCOUNTER — OFFICE VISIT (OUTPATIENT)
Dept: FAMILY MEDICINE | Facility: CLINIC | Age: 56
End: 2021-03-29
Payer: COMMERCIAL

## 2021-03-29 VITALS
WEIGHT: 216 LBS | DIASTOLIC BLOOD PRESSURE: 80 MMHG | HEART RATE: 61 BPM | BODY MASS INDEX: 36.88 KG/M2 | HEIGHT: 64 IN | OXYGEN SATURATION: 97 % | TEMPERATURE: 97.8 F | SYSTOLIC BLOOD PRESSURE: 112 MMHG

## 2021-03-29 DIAGNOSIS — E03.9 HYPOTHYROIDISM, UNSPECIFIED TYPE: ICD-10-CM

## 2021-03-29 DIAGNOSIS — E66.01 MORBID OBESITY (H): ICD-10-CM

## 2021-03-29 DIAGNOSIS — F41.0 PANIC ATTACKS: ICD-10-CM

## 2021-03-29 DIAGNOSIS — I10 HYPERTENSION, GOAL BELOW 140/90: ICD-10-CM

## 2021-03-29 DIAGNOSIS — R53.83 OTHER FATIGUE: Primary | ICD-10-CM

## 2021-03-29 DIAGNOSIS — F41.9 ANXIETY: ICD-10-CM

## 2021-03-29 DIAGNOSIS — E55.9 VITAMIN D DEFICIENCY: ICD-10-CM

## 2021-03-29 DIAGNOSIS — G47.09 OTHER INSOMNIA: ICD-10-CM

## 2021-03-29 DIAGNOSIS — F32.0 MILD MAJOR DEPRESSION (H): ICD-10-CM

## 2021-03-29 LAB
ALBUMIN SERPL-MCNC: 4 G/DL (ref 3.4–5)
ALP SERPL-CCNC: 92 U/L (ref 40–150)
ALT SERPL W P-5'-P-CCNC: 177 U/L (ref 0–50)
ANION GAP SERPL CALCULATED.3IONS-SCNC: 4 MMOL/L (ref 3–14)
AST SERPL W P-5'-P-CCNC: 66 U/L (ref 0–45)
BILIRUB SERPL-MCNC: 0.5 MG/DL (ref 0.2–1.3)
BUN SERPL-MCNC: 11 MG/DL (ref 7–30)
CALCIUM SERPL-MCNC: 8.7 MG/DL (ref 8.5–10.1)
CHLORIDE SERPL-SCNC: 104 MMOL/L (ref 94–109)
CO2 SERPL-SCNC: 30 MMOL/L (ref 20–32)
CREAT SERPL-MCNC: 0.78 MG/DL (ref 0.52–1.04)
DEPRECATED CALCIDIOL+CALCIFEROL SERPL-MC: 63 UG/L (ref 20–75)
GFR SERPL CREATININE-BSD FRML MDRD: 85 ML/MIN/{1.73_M2}
GLUCOSE SERPL-MCNC: 89 MG/DL (ref 70–99)
POTASSIUM SERPL-SCNC: 3.7 MMOL/L (ref 3.4–5.3)
PROT SERPL-MCNC: 7.7 G/DL (ref 6.8–8.8)
SODIUM SERPL-SCNC: 138 MMOL/L (ref 133–144)
TSH SERPL DL<=0.005 MIU/L-ACNC: 0.28 MU/L (ref 0.4–4)

## 2021-03-29 PROCEDURE — 84443 ASSAY THYROID STIM HORMONE: CPT | Performed by: FAMILY MEDICINE

## 2021-03-29 PROCEDURE — 82306 VITAMIN D 25 HYDROXY: CPT | Performed by: FAMILY MEDICINE

## 2021-03-29 PROCEDURE — 36415 COLL VENOUS BLD VENIPUNCTURE: CPT | Performed by: FAMILY MEDICINE

## 2021-03-29 PROCEDURE — 80053 COMPREHEN METABOLIC PANEL: CPT | Performed by: FAMILY MEDICINE

## 2021-03-29 PROCEDURE — 99214 OFFICE O/P EST MOD 30 MIN: CPT | Performed by: FAMILY MEDICINE

## 2021-03-29 PROCEDURE — 84439 ASSAY OF FREE THYROXINE: CPT | Performed by: FAMILY MEDICINE

## 2021-03-29 RX ORDER — LEVOTHYROXINE SODIUM 112 UG/1
112 TABLET ORAL DAILY
Qty: 90 TABLET | Refills: 1 | Status: SHIPPED | OUTPATIENT
Start: 2021-03-29 | End: 2021-10-19

## 2021-03-29 RX ORDER — HYDROCHLOROTHIAZIDE 25 MG/1
25 TABLET ORAL DAILY
Qty: 90 TABLET | Refills: 1 | Status: SHIPPED | OUTPATIENT
Start: 2021-03-29 | End: 2021-06-01

## 2021-03-29 RX ORDER — BUPROPION HYDROCHLORIDE 150 MG/1
150 TABLET ORAL EVERY MORNING
Qty: 90 TABLET | Refills: 0 | Status: SHIPPED | OUTPATIENT
Start: 2021-03-29 | End: 2021-06-01

## 2021-03-29 RX ORDER — BUPROPION HYDROCHLORIDE 300 MG/1
300 TABLET ORAL EVERY MORNING
Qty: 90 TABLET | Refills: 0 | Status: SHIPPED | OUTPATIENT
Start: 2021-03-29 | End: 2021-06-30

## 2021-03-29 RX ORDER — CITALOPRAM HYDROBROMIDE 20 MG/1
20 TABLET ORAL DAILY
Qty: 90 TABLET | Refills: 0 | Status: SHIPPED | OUTPATIENT
Start: 2021-03-29 | End: 2021-06-30

## 2021-03-29 ASSESSMENT — ANXIETY QUESTIONNAIRES
3. WORRYING TOO MUCH ABOUT DIFFERENT THINGS: NEARLY EVERY DAY
2. NOT BEING ABLE TO STOP OR CONTROL WORRYING: NEARLY EVERY DAY
6. BECOMING EASILY ANNOYED OR IRRITABLE: NEARLY EVERY DAY
1. FEELING NERVOUS, ANXIOUS, OR ON EDGE: NEARLY EVERY DAY
5. BEING SO RESTLESS THAT IT IS HARD TO SIT STILL: MORE THAN HALF THE DAYS
7. FEELING AFRAID AS IF SOMETHING AWFUL MIGHT HAPPEN: NEARLY EVERY DAY
GAD7 TOTAL SCORE: 20

## 2021-03-29 ASSESSMENT — PATIENT HEALTH QUESTIONNAIRE - PHQ9
5. POOR APPETITE OR OVEREATING: NEARLY EVERY DAY
SUM OF ALL RESPONSES TO PHQ QUESTIONS 1-9: 20

## 2021-03-29 ASSESSMENT — MIFFLIN-ST. JEOR: SCORE: 1554.77

## 2021-03-29 NOTE — PROGRESS NOTES
Assessment & Plan        (R53.83) Other fatigue  (primary encounter diagnosis)  Comment:   Plan: Comprehensive metabolic panel, TSH with free T4        reflex, T4 free           Discussed possible differential diagnosis for her symptoms. likely insomnia and  stress related vs thyoid or other causes .         Check labs follow up as needed       (F32.0) Mild major depression (H)  Comment:   Plan: buPROPion (WELLBUTRIN XL) 150 MG 24 hr tablet,         buPROPion (WELLBUTRIN XL) 300 MG 24 hr tablet,         citalopram (CELEXA) 20 MG tablet, MENTAL HEALTH        REFERRAL  - Adult; Outpatient Treatment;         Individual/Couples/Family/Group Therapy/Health         Psychology; [facility] [place]: [facility]         [phone]; We will contact you to schedule        the appointment or please call with any         questions            (F41.9) Anxiety  Comment:   Plan: buPROPion (WELLBUTRIN XL) 150 MG 24 hr tablet,         citalopram (CELEXA) 20 MG tablet, MENTAL HEALTH        REFERRAL  - Adult; Outpatient Treatment;         Individual/Couples/Family/Group Therapy/Health         Psychology; [facility]: [facility]         [phone]; We will contact you to schedule        the appointment or please call with any         questions            (F41.0) Panic attacks  Comment:  anxiety sx but panic attack not too bad   Plan: buPROPion (WELLBUTRIN XL) 150 MG 24 hr tablet,         citalopram (CELEXA) 20 MG tablet, MENTAL HEALTH        REFERRAL  - Adult; Outpatient Treatment;         Individual/Couples/Family/Group Therapy/Health         Psychology; [facility]: [facility]         [phone]; We will contact you to schedule        the appointment or please call with any         Questions    (G47.09) Other insomnia  Comment: not sleeping well   Plan: citalopram (CELEXA) 20 MG tablet              Discussed cares, talked about signs and symptoms of anxiety/ depression and treatment options. Willing to  "try citalopram to 20 mg. Pros/ cons of med's discussed .      encouraged to see  to help and referral given . spent sometimes counseling patient. Follow up in 3-4 weeks, sooner if problem.         (I10) Hypertension, goal below 140/90  Comment:   Plan: hydrochlorothiazide (HYDRODIURIL) 25 MG tablet            (E03.9) Hypothyroidism, unspecified type  Comment:   Plan: levothyroxine (SYNTHROID/LEVOTHROID) 112 MCG         tablet          check las adjust med's if needed        (E55.9) Vitamin D deficiency  Comment:   Plan: Vitamin D Deficiency            (E66.01) Morbid obesity (H)  Comment:   Plan: Healthy diet and exercise reviewed.Risks of obesity discussed.  Encourage exercise.       Check labs. Script  sent.Cares and  treatment discussed.   Patient expressed understanding and agreement with treatment plan. All patient's questions were answered, will let me know if has more later.  Medications: Rx's: Reviewed the potential side effects/complications of medications prescribed.        BMI:   Estimated body mass index is 37.08 kg/m  as calculated from the following:    Height as of this encounter: 1.626 m (5' 4\").    Weight as of this encounter: 98 kg (216 lb).   Weight management plan: Discussed healthy diet and exercise guidelines        Return in about 6 weeks (around 5/10/2021) for but return sooner if needed.    Ashley Chavez MD  [facility] [place] is a 55 year old who presents for the following health issues     HPI     Medication Followup of thyroid medication    Taking Medication as prescribed: yes    Side Effects:  no    Medication Helping Symptoms:  NO-is very tired has gained weight     Hypothyroidism Follow-up      Since last visit, patient describes the following symptoms:  Weight gain , more tired.  no hair loss, no skin changes, no constipation, no loose stools            feeling frsutrated with ongoing fatigue weight gain. Could do better with diet " "and exercise. No other uri sx. no fever or chills.            No exposure to any other illnesses etc.       Hypertension Follow-up      Do you check your blood pressure regularly outside of the clinic? Yes 120/ 80 or less . Denies any chest pain, palpitation sob. Leg edema     Are you following a low salt diet? Yes    Are your blood pressures ever more than 140 on the top number (systolic) OR more   than 90 on the bottom number (diastolic), for example 140/90? No    Depression and Anxiety Follow-Up    How are you doing with your depression since your last visit? Worsened mood is up and down and some stress work/ covid stress and \" feels stuck in life \"    How are you doing with your anxiety since your last visit?  Worsened See phq-9 and yossi 7        Are you having other symptoms that might be associated with depression or anxiety? See phq-9 and yossi 7    Have you had a significant life event? OTHER: work routine  Dad passed away 09/2020 ,     Do you have any concerns with your use of alcohol or other drugs? No    Social History     Tobacco Use     Smoking status: Never Smoker     Smokeless tobacco: Never Used   Substance Use Topics     Alcohol use: Yes     Alcohol/week: 0.0 standard drinks     Comment: 2 x week      Drug use: No     PHQ 5/18/2020 10/7/2020 12/19/2020   PHQ-9 Total Score 2 6 18   Q9: Thoughts of better off dead/self-harm past 2 weeks Not at all Not at all Not at all     YOSSI-7 SCORE 1/9/2020 5/18/2020 8/17/2020   Total Score - - -   Total Score - 2 (minimal anxiety) -   Total Score 10 2 21     Last PHQ-9 12/19/2020   1.  Little interest or pleasure in doing things 3   2.  Feeling down, depressed, or hopeless 2   3.  Trouble falling or staying asleep, or sleeping too much 3   4.  Feeling tired or having little energy 3   5.  Poor appetite or overeating 3   6.  Feeling bad about yourself 2   7.  Trouble concentrating 1   8.  Moving slowly or restless 1   Q9: Thoughts of better off dead/self-harm past 2 " weeks 0   PHQ-9 Total Score 18   Difficulty at work, home, or with people -     GAVIN-7  8/17/2020   1. Feeling nervous, anxious, or on edge 3   2. Not being able to stop or control worrying 3   3. Worrying too much about different things 3   4. Trouble relaxing 3   5. Being so restless that it is hard to sit still 3   6. Becoming easily annoyed or irritable 3   7. Feeling afraid, as if something awful might happen 3   GAVIN-7 Total Score 21   If you checked any problems, how difficult have they made it for you to do your work, take care of things at home, or get along with other people? Very difficult       Suicide Assessment Five-step Evaluation and Treatment (SAFE-T)                  PHQ Assesment Total Score(s) 3/29/2021   PHQ-9 Score 20   Some recent data might be hidden       GAVIN-7 Results 5/18/2020   GAVIN 7 TOTAL SCORE 2 (minimal anxiety)   Some recent data might be hidden                 Review of Systems   Constitutional, HEENT, cardiovascular, pulmonary, GI, , musculoskeletal, neuro, skin, endocrine and psych systems are negative, except as otherwise noted.      Objective    There were no vitals taken for this visit.  There is no height or weight on file to calculate BMI.  Physical Exam   GENERAL: healthy, alert and no distress  EYES: Eyes grossly normal to inspection,  and conjunctivae and sclerae normal  HENT: oropharynx clear and oral mucous membranes moist  NECK: no adenopathy, no asymmetry, masses, or scars and thyroid nontender to palpation  RESP: lungs clear to auscultation - no rales, rhonchi or wheezes  CV: regular rate and rhythm, normal S1 S2, no S3 or S4,   ABDOMEN: soft, nontender, no hepatosplenomegaly, no masses and bowel sounds normal  MS: no edema  NEURO: Normal strength and tone, mentation intact and speech normal  PSYCH: mentation appears normal, affect normal/bright, anxious, fatigued, speech pressured, judgement and insight intact and appearance well groomed

## 2021-03-29 NOTE — PATIENT INSTRUCTIONS
"  Patient Education     Tips for Sleep Hygiene  \"Sleep hygiene\" means having good sleep habits.Follow these tips to sleep better at night:     Get on a schedule. Go to bed and get up at about the same time every day.    Listen to your body. Only try to sleep when you actually feel tired or sleepy.    Be patient. If you haven't been able to get to sleep after about 30 minutes or more, get up and do something calming or boring until you feel sleepy. Then return to bed and try again.    Don't have caffeine (coffee, tea, cola drinks, chocolate and some medicines), alcohol or nicotine (cigarettes). These can make it harder for you to fall asleep and stay asleep.    Use your bed for sleeping only. That means no TV, computer or homework in bed, especially during the evening and before bedtime.    Don't nap during the day. If you must nap, make sure it is for less than 20 minutes.    Create sleep rituals that remind your body it is time to sleep. Examples include breathing exercises, stretching or reading a book.    Avoid all electronic media (smart phone, computer, tablet) within 2 hours of bed time. The \"blue light\" in these devices activates the part of the brain that keeps you awake.    Dim the lights at night.    Get early morning sources of light (walk in the sunshine) to help set sleep patterns at night.    Try a bath or shower before bed. Having a warm bath 1 to 2 hours before bedtime can help you feel sleepy. Hot baths can make you alert, so be mindful of the temperature.    Don't watch the clock. Checking the clock during the night can wake you up. It can also lead to negative thoughts such as, \"I will never fall asleep,\" which can increase anxiety and sleeplessness.    Use a sleep diary. Track your sleep schedule to know your sleep patterns and to see where you can improve.    Get regular exercise every day. Try not to do heavy exercise in the 4 hours before bedtime.    Eat a healthy, balanced diet.    Try eating " a light, healthy snack before bed, but avoid eating a heavy meal.    Create the right sleeping area. A cool, dark, quiet room is best. If needed, try earplugs, fans and blackout curtains.    Keep your daytime routine the same even if you have a bad night sleep. Avoiding activities the next day can make it harder to sleep.  For informational purposes only. Not to replace the advice of your health care provider.   Copyright   2013 [facility]. All rights reserved. [employer] 624035 - 01/16.           Patient Education     Anxiety Reaction  Anxiety is the feeling we all get when we think something bad might happen. It is a normal response to stress and normally causes only a mild reaction. When anxiety becomes more severe, it can interfere with daily life. In some cases, you may not even be aware of what you re anxious about. There may also be a genetic link. Or it may be a learned behavior in the home.   Both psychological and physical triggers cause stress reaction. It's often a response to fear or emotional stress, real or imagined. This stress may come from home, family, work, or social relationships.   During an anxiety reaction, you may feel:    Helpless    Nervous    Depressed    Grouchy  Your body may show signs of anxiety in many ways. You may experience:    Dry mouth    Shakiness    Dizziness    Weakness    Trouble breathing    Breathing fast (hyperventilating)    Chest pressure    Sweating    Headache    Nausea    Diarrhea    Tiredness    Inability to sleep    Sexual problems  Home care    Try to find the sources of stress in your life. They may not be obvious. These may include:  ? Daily hassles of life (such as traffic jams, missed appointments, or car troubles)  ? Major life changes, both good (new baby or job promotion) and bad (loss of job or loss of loved one)  ? Overload (feeling that you have too many responsibilities and can't take care of all of them at once)  ? Feeling helpless or  feeling that your problems can't be solved    Notice how your body reacts to stress. Learn to listen to your body signals. This will help you take action before the stress becomes severe.    When you can, do something about the source of your stress. (Avoid hassles, limit the amount of change that happens in your life at one time, and take a break when you feel overloaded).    Unfortunately, many stressful situations can't be avoided. It is necessary to learn how to better manage stress. There are many proven methods that will reduce your anxiety. These include simple things such as exercise, good nutrition, and adequate rest. Also, there are certain techniques that are helpful:  ? Relaxation  ? Breathing exercises  ? Visualization  ? Biofeedback  ? Meditation  For more information about this, talk with your healthcare provider. Or check online or at your local library or bookstore. You'll find many books and audiobooks on this subject.   Follow-up care  If you feel your anxiety is not responding to self-help measures, call your healthcare provider or make an appointment with a counselor. You may need short-term psychological counseling or medicine to help you manage stress.   Call 911  Call 911 if any of these happen:     Trouble breathing    Confusion    Drowsiness or trouble waking up    Fainting or loss of consciousness    Rapid heart rate    Seizure    New chest pain that becomes more severe, lasts longer, or spreads into your shoulder, arm, neck, jaw, or back  When to get medical advice  Call your healthcare provider right away if any of these happen:    Your symptoms get worse    Severe headache not eased by rest and mild pain reliever  [employer] last reviewed this educational content on 4/1/2020 2000-2020 [employer]. All rights reserved. This information is not intended as a substitute for professional medical care. Always follow your healthcare professional's instructions.           Patient  Education     Anxiety Reaction  Anxiety is the feeling we all get when we think something bad might happen. It is a normal response to stress and normally causes only a mild reaction. When anxiety becomes more severe, it can interfere with daily life. In some cases, you may not even be aware of what you re anxious about. There may also be a genetic link. Or it may be a learned behavior in the home.   Both psychological and physical triggers cause stress reaction. It's often a response to fear or emotional stress, real or imagined. This stress may come from home, family, work, or social relationships.   During an anxiety reaction, you may feel:    Helpless    Nervous    Depressed    Grouchy  Your body may show signs of anxiety in many ways. You may experience:    Dry mouth    Shakiness    Dizziness    Weakness    Trouble breathing    Breathing fast (hyperventilating)    Chest pressure    Sweating    Headache    Nausea    Diarrhea    Tiredness    Inability to sleep    Sexual problems  Home care    Try to find the sources of stress in your life. They may not be obvious. These may include:  ? Daily hassles of life (such as traffic jams, missed appointments, or car troubles)  ? Major life changes, both good (new baby or job promotion) and bad (loss of job or loss of loved one)  ? Overload (feeling that you have too many responsibilities and can't take care of all of them at once)  ? Feeling helpless or feeling that your problems can't be solved    Notice how your body reacts to stress. Learn to listen to your body signals. This will help you take action before the stress becomes severe.    When you can, do something about the source of your stress. (Avoid hassles, limit the amount of change that happens in your life at one time, and take a break when you feel overloaded).    Unfortunately, many stressful situations can't be avoided. It is necessary to learn how to better manage stress. There are many proven methods that  will reduce your anxiety. These include simple things such as exercise, good nutrition, and adequate rest. Also, there are certain techniques that are helpful:  ? Relaxation  ? Breathing exercises  ? Visualization  ? Biofeedback  ? Meditation  For more information about this, talk with your healthcare provider. Or check online or at your local library or bookstore. You'll find many books and audiobooks on this subject.   Follow-up care  If you feel your anxiety is not responding to self-help measures, call your healthcare provider or make an appointment with a counselor. You may need short-term psychological counseling or medicine to help you manage stress.   Call 911  Call 911 if any of these happen:     Trouble breathing    Confusion    Drowsiness or trouble waking up    Fainting or loss of consciousness    Rapid heart rate    Seizure    New chest pain that becomes more severe, lasts longer, or spreads into your shoulder, arm, neck, jaw, or back  When to get medical advice  Call your healthcare provider right away if any of these happen:    Your symptoms get worse    Severe headache not eased by rest and mild pain reliever  [employer] last reviewed this educational content on 4/1/2020 2000-2020 [employer]. All rights reserved. This information is not intended as a substitute for professional medical care. Always follow your healthcare professional's instructions.

## 2021-03-30 LAB — T4 FREE SERPL-MCNC: 1.31 NG/DL (ref 0.76–1.46)

## 2021-03-30 ASSESSMENT — ANXIETY QUESTIONNAIRES: GAD7 TOTAL SCORE: 20

## 2021-04-04 ENCOUNTER — MYC MEDICAL ADVICE (OUTPATIENT)
Dept: FAMILY MEDICINE | Facility: CLINIC | Age: 56
End: 2021-04-04

## 2021-04-04 DIAGNOSIS — H60.541 DERMATITIS OF EAR CANAL, RIGHT: ICD-10-CM

## 2021-04-05 RX ORDER — NEOMYCIN SULFATE, POLYMYXIN B SULFATE, HYDROCORTISONE 3.5; 10000; 1 MG/ML; [USP'U]/ML; MG/ML
3 SOLUTION/ DROPS AURICULAR (OTIC) 4 TIMES DAILY
Qty: 10 ML | Refills: 1 | Status: SHIPPED | OUTPATIENT
Start: 2021-04-05 | End: 2021-06-01

## 2021-04-05 NOTE — TELEPHONE ENCOUNTER
Routing refill request to provider for review/approval because:  Drug not on the [facility] refill protocol     Jeni Matt RN

## 2021-06-01 ENCOUNTER — OFFICE VISIT (OUTPATIENT)
Dept: OBGYN | Facility: CLINIC | Age: 56
End: 2021-06-01
Payer: COMMERCIAL

## 2021-06-01 VITALS
SYSTOLIC BLOOD PRESSURE: 124 MMHG | HEART RATE: 68 BPM | BODY MASS INDEX: 36.29 KG/M2 | RESPIRATION RATE: 16 BRPM | DIASTOLIC BLOOD PRESSURE: 66 MMHG | HEIGHT: 64 IN | WEIGHT: 212.6 LBS

## 2021-06-01 DIAGNOSIS — Z01.419 ENCOUNTER FOR GYNECOLOGICAL EXAMINATION WITHOUT ABNORMAL FINDING: Primary | ICD-10-CM

## 2021-06-01 LAB
BASOPHILS # BLD AUTO: 0 10E9/L (ref 0–0.2)
BASOPHILS NFR BLD AUTO: 0.2 %
DIFFERENTIAL METHOD BLD: NORMAL
EOSINOPHIL # BLD AUTO: 0.1 10E9/L (ref 0–0.7)
EOSINOPHIL NFR BLD AUTO: 1.6 %
ERYTHROCYTE [DISTWIDTH] IN BLOOD BY AUTOMATED COUNT: 12.2 % (ref 10–15)
HCT VFR BLD AUTO: 43.5 % (ref 35–47)
HGB BLD-MCNC: 14.3 G/DL (ref 11.7–15.7)
LYMPHOCYTES # BLD AUTO: 2.2 10E9/L (ref 0.8–5.3)
LYMPHOCYTES NFR BLD AUTO: 25.2 %
MCH RBC QN AUTO: 30.9 PG (ref 26.5–33)
MCHC RBC AUTO-ENTMCNC: 32.9 G/DL (ref 31.5–36.5)
MCV RBC AUTO: 94 FL (ref 78–100)
MONOCYTES # BLD AUTO: 0.6 10E9/L (ref 0–1.3)
MONOCYTES NFR BLD AUTO: 6.4 %
NEUTROPHILS # BLD AUTO: 5.7 10E9/L (ref 1.6–8.3)
NEUTROPHILS NFR BLD AUTO: 66.6 %
PLATELET # BLD AUTO: 340 10E9/L (ref 150–450)
RBC # BLD AUTO: 4.63 10E12/L (ref 3.8–5.2)
WBC # BLD AUTO: 8.5 10E9/L (ref 4–11)

## 2021-06-01 PROCEDURE — 85025 COMPLETE CBC W/AUTO DIFF WBC: CPT | Performed by: OBSTETRICS & GYNECOLOGY

## 2021-06-01 PROCEDURE — 99386 PREV VISIT NEW AGE 40-64: CPT | Performed by: OBSTETRICS & GYNECOLOGY

## 2021-06-01 PROCEDURE — 36415 COLL VENOUS BLD VENIPUNCTURE: CPT | Performed by: OBSTETRICS & GYNECOLOGY

## 2021-06-01 ASSESSMENT — PATIENT HEALTH QUESTIONNAIRE - PHQ9
SUM OF ALL RESPONSES TO PHQ QUESTIONS 1-9: 17
5. POOR APPETITE OR OVEREATING: SEVERAL DAYS

## 2021-06-01 ASSESSMENT — ANXIETY QUESTIONNAIRES
6. BECOMING EASILY ANNOYED OR IRRITABLE: NOT AT ALL
3. WORRYING TOO MUCH ABOUT DIFFERENT THINGS: SEVERAL DAYS
5. BEING SO RESTLESS THAT IT IS HARD TO SIT STILL: NOT AT ALL
IF YOU CHECKED OFF ANY PROBLEMS ON THIS QUESTIONNAIRE, HOW DIFFICULT HAVE THESE PROBLEMS MADE IT FOR YOU TO DO YOUR WORK, TAKE CARE OF THINGS AT HOME, OR GET ALONG WITH OTHER PEOPLE: SOMEWHAT DIFFICULT
1. FEELING NERVOUS, ANXIOUS, OR ON EDGE: SEVERAL DAYS
GAD7 TOTAL SCORE: 4
2. NOT BEING ABLE TO STOP OR CONTROL WORRYING: SEVERAL DAYS
7. FEELING AFRAID AS IF SOMETHING AWFUL MIGHT HAPPEN: NOT AT ALL

## 2021-06-01 ASSESSMENT — MIFFLIN-ST. JEOR: SCORE: 1539.35

## 2021-06-01 NOTE — PROGRESS NOTES
Taylor is a 55 year old  female who presents for annual exam.     Besides routine health maintenance, she has no other health concerns today .    HPI:  The patient's PCP is  Ashley Chavez MD.    Patient has excessive sleepiness  Had thryoid, cmp checked and Vit D by her pcp  Will check cbc  Periods quit years ago so anemia for blood loss not likely    Prior breast reduction        GYNECOLOGIC HISTORY:r current contraception method is: menopause.  Last Mammo: 19, Result: Normal, Next Mammo: This year  Pap:   Lab Results   Component Value Date    PAP NIL 10/31/2017      Colonoscopy: 10/19/2017  , Result: Normal, Next Colonoscopy: 1-2 years.  Dexa:  N/A    Health maintenance updated:  yes    HISTORY:  OB History    Para Term  AB Living   1 1 1 0 0 1   SAB TAB Ectopic Multiple Live Births   0 0 0 0 1      # Outcome Date GA Lbr Dion/2nd Weight Sex Delivery Anes PTL Lv   1 Term         CAROL ANN       Patient Active Problem List   Diagnosis     Hypothyroidism     Morbid obesity (H)     Rosacea     CARDIOVASCULAR SCREENING; LDL GOAL LESS THAN 130     Plantar fasciitis     Anxiety     Enthesopathy of ankle and tarsus     Snoring     GERD (gastroesophageal reflux disease)     Hypertension, goal below 140/90     Panic attacks     Vitamin D deficiency     Paresthesia     Hyperglycemia     Sleep disorder     Mild major depression (H)     Cystic acne     Other specified hypothyroidism     Hyperlipidemia LDL goal <130     Hypertension     Hypothyroid     Other insomnia     Idiopathic neuropathy     BMI 35.0-35.9,adult     Diverticulosis of colon     Past Surgical History:   Procedure Laterality Date     BREAST SURGERY      breast reduction     COLONOSCOPY N/A 10/19/2017    Procedure: COMBINED COLONOSCOPY, SINGLE OR MULTIPLE BIOPSY/POLYPECTOMY BY BIOPSY;  colonoscopy;  Surgeon: Marquise Haskins MD;  Location:  GI     TONSILLECTOMY        Social History     Tobacco Use     Smoking status: Never  "Smoker     Smokeless tobacco: Never Used   Substance Use Topics     Alcohol use: Yes     Alcohol/week: 0.0 standard drinks     Comment: 2 x week       Problem (# of Occurrences) Relation (Name,Age of Onset)    Diabetes (2) Sister, Maternal Grandmother    Hypertension (2) Father, Mother    Thyroid Disease (1) Father       Negative family history of: Breast Cancer, Cancer - colorectal, C.A.D.            Current Outpatient Medications   Medication Sig     buPROPion (WELLBUTRIN XL) 300 MG 24 hr tablet Take 1 tablet (300 mg) by mouth every morning     citalopram (CELEXA) 20 MG tablet Take 1 tablet (20 mg) by mouth daily     levothyroxine (SYNTHROID/LEVOTHROID) 112 MCG tablet Take 1 tablet (112 mcg) by mouth daily     traZODone (DESYREL) 50 MG tablet Take 1 tablet (50 mg) by mouth At Bedtime     No current facility-administered medications for this visit.      Allergies   Allergen Reactions     No Known Allergies        Past medical, surgical, social and family histories were reviewed and updated in EPIC.    ROS:   12 point review of systems negative other than symptoms noted below or in the HPI.  No urinary frequency or dysuria, bladder or kidney problems    EXAM:  /66 (BP Location: Right arm, Patient Position: Sitting, Cuff Size: Adult Regular)   Pulse 68   Resp 16   Ht 1.626 m (5' 4\")   Wt 96.4 kg (212 lb 9.6 oz)   Breastfeeding No   BMI 36.49 kg/m     BMI: Body mass index is 36.49 kg/m .    PHYSICAL EXAM:  Constitutional:   Appearance: Well nourished, well developed, alert, in no acute distress  Neck:  Lymph Nodes:  No lymphadenopathy present    Thyroid:  Gland size normal, nontender, no nodules or masses present  on palpation  Chest:  Respiratory Effort:  Breathing unlabored  Cardiovascular:    Heart: Auscultation:  Regular rate, normal rhythm, no murmurs present  Breasts: Inspection of Breasts:  No lymphadenopathy present., Palpation of Breasts and Axillae:  No masses present on palpation, no breast " tenderness., Axillary Lymph Nodes:  No lymphadenopathy present. and No nodularity, asymmetry or nipple discharge bilaterally.  Gastrointestinal:   Abdominal Examination:  Abdomen nontender to palpation, tone normal without rigidity or guarding, no masses present, umbilicus without lesions   Liver and Spleen:  No hepatomegaly present, liver nontender to palpation    Hernias:  No hernias present  Lymphatic: Lymph Nodes:  No other lymphadenopathy present  Skin:  General Inspection:  No rashes present, no lesions present, no areas of  discoloration  Neurologic:    Mental Status:  Oriented X3.  Normal strength and tone, sensory exam                grossly normal, mentation intact and speech normal.    Psychiatric:   Mentation appears normal and affect normal/bright.         Pelvic Exam:  External Genitalia:     Normal appearance for age, no discharge present, no tenderness present, no inflammatory lesions present, color normal  Vagina:     Normal vaginal vault without central or paravaginal defects, no discharge present, no inflammatory lesions present, no masses present  Bladder:     Nontender to palpation  Urethra:   Urethral Body:  Urethra palpation normal, urethra structural support normal   Urethral Meatus:  No erythema or lesions present  Cervix:     Appearance healthy, no lesions present, nontender to palpation, no bleeding present  Uterus:     Uterus: firm, normal sized and nontender, anteverted in position.   Adnexa:     No adnexal tenderness present, no adnexal masses present  Perineum:     Perineum within normal limits, no evidence of trauma, no rashes or skin lesions present  Anus:     Anus within normal limits, no hemorrhoids present  Inguinal Lymph Nodes:     No lymphadenopathy present  Pubic Hair:     Normal pubic hair distribution for age  Genitalia and Groin:     No rashes present, no lesions present, no areas of discoloration, no masses present      COUNSELING:   Reviewed preventive health counseling,  as reflected in patient instructions       Regular exercise       Healthy diet/nutrition    BMI: Body mass index is 36.49 kg/m .  Weight management plan: Patient was referred to their PCP to discuss a diet and exercise plan.    ASSESSMENT:  55 year old female with satisfactory annual exam.    ICD-10-CM    1. Encounter for gynecological examination without abnormal finding  Z01.419 CBC with platelets differential       PLAN:  CBC with diff sent  F/u with her pcp regarding her ongoing chronic fatigue, might need a sleep study  Mammogram  Pap not due this years  Patient denies symptoms suggesting depression  Working full time as a teacher in school

## 2021-06-02 ASSESSMENT — ANXIETY QUESTIONNAIRES: GAD7 TOTAL SCORE: 4

## 2021-06-28 DIAGNOSIS — F32.0 MILD MAJOR DEPRESSION (H): ICD-10-CM

## 2021-06-28 DIAGNOSIS — G47.09 OTHER INSOMNIA: ICD-10-CM

## 2021-06-28 DIAGNOSIS — F41.9 ANXIETY: ICD-10-CM

## 2021-06-28 DIAGNOSIS — F41.0 PANIC ATTACKS: ICD-10-CM

## 2021-06-29 NOTE — TELEPHONE ENCOUNTER
Routing refill request to provider for review/approval because:  Patient is requesting both bupropion  mg and 300 mg. Current medication list only has 300 mg.     PHQ 3/29/2021 6/1/2021 6/29/2021   PHQ-9 Total Score 20 17 2   Q9: Thoughts of better off dead/self-harm past 2 weeks Not at all Not at all Not at all     Margret Reynolds RN

## 2021-06-30 RX ORDER — BUPROPION HYDROCHLORIDE 150 MG/1
150 TABLET ORAL EVERY MORNING
Qty: 90 TABLET | Refills: 0 | Status: SHIPPED | OUTPATIENT
Start: 2021-06-30 | End: 2021-10-19

## 2021-06-30 RX ORDER — BUPROPION HYDROCHLORIDE 300 MG/1
TABLET ORAL
Qty: 90 TABLET | Refills: 0 | Status: SHIPPED | OUTPATIENT
Start: 2021-06-30 | End: 2021-09-16

## 2021-06-30 RX ORDER — CITALOPRAM HYDROBROMIDE 20 MG/1
20 TABLET ORAL DAILY
Qty: 90 TABLET | Refills: 0 | Status: SHIPPED | OUTPATIENT
Start: 2021-06-30 | End: 2021-10-19

## 2021-08-30 ENCOUNTER — HOSPITAL ENCOUNTER (OUTPATIENT)
Dept: MAMMOGRAPHY | Facility: CLINIC | Age: 56
Discharge: HOME | End: 2021-08-30
Attending: FAMILY MEDICINE | Admitting: FAMILY MEDICINE
Payer: COMMERCIAL

## 2021-08-30 DIAGNOSIS — Z12.31 SCREENING MAMMOGRAM, ENCOUNTER FOR: ICD-10-CM

## 2021-08-30 PROCEDURE — 77063 BREAST TOMOSYNTHESIS BI: CPT

## 2021-09-16 ENCOUNTER — MYC REFILL (OUTPATIENT)
Dept: FAMILY MEDICINE | Facility: CLINIC | Age: 56
End: 2021-09-16

## 2021-09-16 DIAGNOSIS — F32.0 MILD MAJOR DEPRESSION (H): ICD-10-CM

## 2021-09-16 RX ORDER — BUPROPION HYDROCHLORIDE 300 MG/1
TABLET ORAL
Qty: 90 TABLET | Refills: 0 | Status: SHIPPED | OUTPATIENT
Start: 2021-09-16 | End: 2021-10-19

## 2021-09-25 ENCOUNTER — HEALTH MAINTENANCE LETTER (OUTPATIENT)
Age: 56
End: 2021-09-25

## 2021-10-06 ENCOUNTER — MYC MEDICAL ADVICE (OUTPATIENT)
Dept: FAMILY MEDICINE | Facility: CLINIC | Age: 56
End: 2021-10-06

## 2021-10-06 DIAGNOSIS — F41.0 PANIC ATTACKS: ICD-10-CM

## 2021-10-06 RX ORDER — ALPRAZOLAM 0.25 MG
0.25 TABLET ORAL 3 TIMES DAILY PRN
Qty: 14 TABLET | Refills: 0 | Status: SHIPPED | OUTPATIENT
Start: 2021-10-06 | End: 2022-08-04

## 2021-10-06 NOTE — TELEPHONE ENCOUNTER
Due for office visit, only 2 weeks supply submitted.  No additional refills until seen in clinic.    Please call and inform pt to schedule a/an a medication check with PCP.

## 2021-10-06 NOTE — TELEPHONE ENCOUNTER
Controlled Substance Refill Request for   Requested Prescriptions   Pending Prescriptions Disp Refills     ALPRAZolam (XANAX) 0.25 MG tablet  Last Written Prescription Date:  10/7/2020  Last Fill Quantity: 14,  # refills: 0   Last office visit: 9/20/2021 with prescribing provider:  Scott   Future Office Visit:     14 tablet 0     Sig: Take 1 tablet (0.25 mg) by mouth 3 times daily as needed for anxiety       There is no refill protocol information for this order          Problem List Complete:  No     PROVIDER TO CONSIDER COMPLETION OF PROBLEM LIST AND OVERVIEW/CONTROLLED SUBSTANCE AGREEMENT      Controlled substance agreement:   Encounter-Level CSA:    There are no encounter-level csa.     Patient-Level CSA:    There are no patient-level csa.         Last Urine Drug Screen: No results found for: CDAUT, No results found for: COMDAT, No results found for: THC13, PCP13, COC13, MAMP13, OPI13, AMP13, BZO13, TCA13, MTD13, BAR13, OXY13, PPX13, BUP13     Processing:  Rx to be electronically transmitted to pharmacy by provider      [email or web address]       checked in past 3 months?  Yes 10/6/2021, no concerns   [place] reviewed:  reviewed- no concerns    [place]:  [email or web address]  Routing refill request to provider for review/approval because:  Drug not on the [facility] refill protocol

## 2021-10-19 ENCOUNTER — VIRTUAL VISIT (OUTPATIENT)
Dept: FAMILY MEDICINE | Facility: CLINIC | Age: 56
End: 2021-10-19
Payer: COMMERCIAL

## 2021-10-19 DIAGNOSIS — F41.0 PANIC ATTACKS: ICD-10-CM

## 2021-10-19 DIAGNOSIS — F32.0 MILD MAJOR DEPRESSION (H): ICD-10-CM

## 2021-10-19 DIAGNOSIS — Z11.4 ENCOUNTER FOR SCREENING FOR HIV: ICD-10-CM

## 2021-10-19 DIAGNOSIS — G47.09 OTHER INSOMNIA: ICD-10-CM

## 2021-10-19 DIAGNOSIS — F41.9 ANXIETY: ICD-10-CM

## 2021-10-19 DIAGNOSIS — E03.9 HYPOTHYROIDISM, UNSPECIFIED TYPE: Primary | ICD-10-CM

## 2021-10-19 DIAGNOSIS — E78.5 HYPERLIPIDEMIA LDL GOAL <130: ICD-10-CM

## 2021-10-19 PROCEDURE — 99214 OFFICE O/P EST MOD 30 MIN: CPT | Mod: GT | Performed by: FAMILY MEDICINE

## 2021-10-19 RX ORDER — BUPROPION HYDROCHLORIDE 300 MG/1
TABLET ORAL
Qty: 90 TABLET | Refills: 1 | Status: SHIPPED | OUTPATIENT
Start: 2021-10-19 | End: 2022-06-18

## 2021-10-19 RX ORDER — BUPROPION HYDROCHLORIDE 150 MG/1
150 TABLET ORAL EVERY MORNING
Qty: 90 TABLET | Refills: 1 | Status: SHIPPED | OUTPATIENT
Start: 2021-10-19 | End: 2022-07-05

## 2021-10-19 RX ORDER — LEVOTHYROXINE SODIUM 112 UG/1
112 TABLET ORAL DAILY
Qty: 90 TABLET | Refills: 3 | Status: SHIPPED | OUTPATIENT
Start: 2021-10-19 | End: 2022-08-04

## 2021-10-19 RX ORDER — CITALOPRAM HYDROBROMIDE 20 MG/1
20 TABLET ORAL DAILY
Qty: 90 TABLET | Refills: 1 | Status: SHIPPED | OUTPATIENT
Start: 2021-10-19 | End: 2022-08-04

## 2021-10-19 ASSESSMENT — PATIENT HEALTH QUESTIONNAIRE - PHQ9
SUM OF ALL RESPONSES TO PHQ QUESTIONS 1-9: 4
10. IF YOU CHECKED OFF ANY PROBLEMS, HOW DIFFICULT HAVE THESE PROBLEMS MADE IT FOR YOU TO DO YOUR WORK, TAKE CARE OF THINGS AT HOME, OR GET ALONG WITH OTHER PEOPLE: NOT DIFFICULT AT ALL
SUM OF ALL RESPONSES TO PHQ QUESTIONS 1-9: 4

## 2021-10-19 NOTE — PROGRESS NOTES
Taylor is a 56 year old who is being evaluated via a billable video visit.      How would you like to obtain your AVS? MyChart  If the video visit is dropped, the invitation should be resent by: Text to cell phone: [phone]  Will anyone else be joining your video visit? No      Video Start Time: 4:32 PM    Assessment & Plan               (F32.0) Mild major depression (H)  Comment:   Plan: buPROPion (WELLBUTRIN XL) 150 MG 24 hr tablet,         buPROPion (WELLBUTRIN XL) 300 MG 24 hr tablet,         citalopram (CELEXA) 20 MG tablet            (F41.9) Anxiety  Comment:   Plan: buPROPion (WELLBUTRIN XL) 150 MG 24 hr tablet,         citalopram (CELEXA) 20 MG tablet            Discussed cares, talked about signs and symptoms of anxiety/ depression and treatment options. Willing to continue same med's for now    Pros/ cons of med's discussed . encouraged to see  to help and referral given . spent sometimes counseling patient. Follow up in 4-6  months, sooner if problem.     (F41.0) Panic attacks  Comment: stable   Plan: buPROPion (WELLBUTRIN XL) 150 MG 24 hr tablet,         citalopram (CELEXA) 20 MG tablet            (G47.09) Other insomnia  Comment: stable , not taking trazodone any more   Plan: citalopram (CELEXA) 20 MG tablet            (E78.5) Hyperlipidemia LDL goal <130  Comment:   Plan: Lipid panel reflex to direct LDL Fasting            (Z11.4) Encounter for screening for HIV  Comment: pt wish to proceed   Plan: HIV Antigen Antibody Combo              (E03.9) Hypothyroidism, unspecified type  (primary encounter diagnosis)  Comment: stable due for labs as last TSH was out of range   Plan: TSH with free T4 reflex, levothyroxine         (SYNTHROID/LEVOTHROID) 112 MCG tablet    Check labs. refill sent.Cares and  treatment discussed follow. up if problem   Patient expressed understanding and agreement with treatment plan. All patient's questions were answered, will let me know if has more later.  Medications:  Rx's: Reviewed the potential side effects/complications of medications prescribed.       Ashley Chavez MD  [facility] GAYLA Vazquez is a 56 year old who presents for the following health issues     HPI     Anxiety Follow-Up    How are you doing with your anxiety since your last visit? No change, takes Xanax once every three weeks  occasionally for any situational  anxiety and it a prevent her form getting any panic attack etc    , doing med check, going ok and wasn't to continue with same med's has not really taken trazodone for long times and sleep is ok , so she thinks she is fine without it     Are you having other symptoms that might be associated with anxiety? No    Have you had a significant life event? OTHER: recently had a couple memorial services  So it causes a little bit stress but nothing overwhelming. Took xanax and it helped      Are you feeling depressed? No    Do you have any concerns with your use of alcohol or other drugs? No    Social History     Tobacco Use     Smoking status: Never Smoker     Smokeless tobacco: Never Used   Substance Use Topics     Alcohol use: Yes     Alcohol/week: 0.0 standard drinks     Comment: 2 x week      Drug use: No     GAVIN-7 SCORE 3/29/2021 6/1/2021 6/29/2021   Total Score - - -   Total Score - - 1 (minimal anxiety)   Total Score 20 4 1     PHQ 6/1/2021 6/29/2021 10/19/2021   PHQ-9 Total Score 17 2 4   Q9: Thoughts of better off dead/self-harm past 2 weeks Not at all Not at all Not at all     Last PHQ-9 10/19/2021   1.  Little interest or pleasure in doing things 0   2.  Feeling down, depressed, or hopeless 1   3.  Trouble falling or staying asleep, or sleeping too much 1   4.  Feeling tired or having little energy 1   5.  Poor appetite or overeating 1   6.  Feeling bad about yourself 0   7.  Trouble concentrating 0   8.  Moving slowly or restless 0   Q9: Thoughts of better off dead/self-harm past 2 weeks 0   PHQ-9 Total Score 4    Difficulty at work, home, or with people -     GAVIN-7  6/29/2021   1. Feeling nervous, anxious, or on edge 0   2. Not being able to stop or control worrying 0   3. Worrying too much about different things 0   4. Trouble relaxing 0   5. Being so restless that it is hard to sit still 0   6. Becoming easily annoyed or irritable 0   7. Feeling afraid, as if something awful might happen 1   GAVIN-7 Total Score 1   If you checked any problems, how difficult have they made it for you to do your work, take care of things at home, or get along with other people? -         How many servings of fruits and vegetables do you eat daily?  2-3    On average, how many sweetened beverages do you drink each day (Examples: soda, juice, sweet tea, etc.  Do NOT count diet or artificially sweetened beverages)?   0    How many days per week do you exercise enough to make your heart beat faster? 3 or less    How many minutes a day do you exercise enough to make your heart beat faster? 9 or less    How many days per week do you miss taking your medication? 0    Hyperlipidemia Follow-Up      Are you regularly taking any medication or supplement to lower your cholesterol?   No  no but wants her lipid checked she is trying to eat better and exercising     Are you having muscle aches or other side effects that you think could be caused by your cholesterol lowering medication?  Na     Hypothyroidism Follow-up      Since last visit, patient describes the following symptoms: TSH was slightly off range last time so due for follow unit(s)p check     has lost weight as she was trying to loose weight but she  Is stable .     , no other concerning sx and feeling well otherwise               Review of Systems   Constitutional, HEENT, cardiovascular, pulmonary, GI, , musculoskeletal, neuro, skin, endocrine and psych systems are negative, except as otherwise noted.      Objective           Vitals:  No vitals were obtained today due to virtual  visit.    Physical Exam   GENERAL: Healthy, alert and no distress  EYES: Eyes grossly normal to inspection.  No discharge or erythema, or obvious scleral/conjunctival abnormalities.  RESP: No audible wheeze, cough, or visible cyanosis.  No visible retractions or increased work of breathing.    SKIN: Visible skin clear. No significant rash, abnormal pigmentation or lesions.  NEURO: Cranial nerves grossly intact.  Mentation and speech appropriate for age.  PSYCH: Mentation appears normal, affect normal/bright, judgement and insight intact, normal speech and appearance well-groomed.            Video-Visit Details    Type of service:  Video Visit    Video End Time:4:53 PM    Originating Location (pt. Location): Home    Distant Location (provider location):  [facility]     Platform used for Video Visit: [employer]

## 2021-10-26 ENCOUNTER — ALLIED HEALTH/NURSE VISIT (OUTPATIENT)
Dept: FAMILY MEDICINE | Facility: CLINIC | Age: 56
End: 2021-10-26
Payer: COMMERCIAL

## 2021-10-26 ENCOUNTER — LAB (OUTPATIENT)
Dept: LAB | Facility: CLINIC | Age: 56
End: 2021-10-26
Payer: COMMERCIAL

## 2021-10-26 DIAGNOSIS — E03.9 HYPOTHYROIDISM, UNSPECIFIED TYPE: ICD-10-CM

## 2021-10-26 DIAGNOSIS — E78.5 HYPERLIPIDEMIA LDL GOAL <130: ICD-10-CM

## 2021-10-26 DIAGNOSIS — I10 ESSENTIAL HYPERTENSION: ICD-10-CM

## 2021-10-26 DIAGNOSIS — Z23 NEED FOR PROPHYLACTIC VACCINATION AND INOCULATION AGAINST INFLUENZA: Primary | ICD-10-CM

## 2021-10-26 DIAGNOSIS — F41.9 ANXIETY: ICD-10-CM

## 2021-10-26 DIAGNOSIS — F32.0 MILD MAJOR DEPRESSION (H): ICD-10-CM

## 2021-10-26 DIAGNOSIS — Z13.6 CARDIOVASCULAR SCREENING; LDL GOAL LESS THAN 160: ICD-10-CM

## 2021-10-26 DIAGNOSIS — Z11.4 ENCOUNTER FOR SCREENING FOR HIV: ICD-10-CM

## 2021-10-26 LAB
CHOLEST SERPL-MCNC: 183 MG/DL
FASTING STATUS PATIENT QL REPORTED: YES
HDLC SERPL-MCNC: 68 MG/DL
HIV 1+2 AB+HIV1 P24 AG SERPL QL IA: NONREACTIVE
LDLC SERPL CALC-MCNC: 105 MG/DL
NONHDLC SERPL-MCNC: 115 MG/DL
TRIGL SERPL-MCNC: 52 MG/DL
TSH SERPL DL<=0.005 MIU/L-ACNC: 2.05 MU/L (ref 0.4–4)

## 2021-10-26 PROCEDURE — 87389 HIV-1 AG W/HIV-1&-2 AB AG IA: CPT

## 2021-10-26 PROCEDURE — 99207 PR NO CHARGE NURSE ONLY: CPT

## 2021-10-26 PROCEDURE — 84443 ASSAY THYROID STIM HORMONE: CPT

## 2021-10-26 PROCEDURE — 90471 IMMUNIZATION ADMIN: CPT

## 2021-10-26 PROCEDURE — 36415 COLL VENOUS BLD VENIPUNCTURE: CPT

## 2021-10-26 PROCEDURE — 80061 LIPID PANEL: CPT

## 2021-10-26 PROCEDURE — 90682 RIV4 VACC RECOMBINANT DNA IM: CPT

## 2022-02-07 ENCOUNTER — E-VISIT (OUTPATIENT)
Dept: URGENT CARE | Facility: CLINIC | Age: 57
End: 2022-02-07
Payer: COMMERCIAL

## 2022-02-07 DIAGNOSIS — H10.31 ACUTE BACTERIAL CONJUNCTIVITIS OF RIGHT EYE: Primary | ICD-10-CM

## 2022-02-07 PROCEDURE — 99421 OL DIG E/M SVC 5-10 MIN: CPT | Performed by: NURSE PRACTITIONER

## 2022-02-07 RX ORDER — OFLOXACIN 3 MG/ML
1-2 SOLUTION/ DROPS OPHTHALMIC EVERY 4 HOURS
Qty: 5 ML | Refills: 0 | Status: SHIPPED | OUTPATIENT
Start: 2022-02-07 | End: 2022-08-04

## 2022-02-07 NOTE — PATIENT INSTRUCTIONS
Thank you for choosing us for your care. I have placed an order for a prescription so that you can start treatment. View your full visit summary for details by clicking on the link below. Your pharmacist will able to address any questions you may have about the medication.     If you re not feeling better within 2-3 days, please schedule an appointment.  You can schedule an appointment right here in [facility], or call [phone]  If the visit is for the same symptoms as your eVisit, we ll refund the cost of your eVisit if seen within seven days.      Bacterial Conjunctivitis    You have an infection in the membranes covering the white part of the eye. This part of the eye is called the conjunctiva. The infection is called conjunctivitis. The most common symptoms of conjunctivitis include a thick, pus-like discharge from the eye, swollen eyelids, redness, eyelids sticking together upon awakening, and a gritty or scratchy feeling in the eye. Your infection was caused by bacteria. It may be treated with medicine. With treatment, the infection takes about 7 to 10 days to resolve.   Home care    Use prescribed antibiotic eye drops or ointment as directed to treat the infection.    Apply a warm compress (towel soaked in warm water) to the affected eye 3 to 4 times a day. Do this just before applying medicine to the eye.    Use a warm, wet cloth to wipe away crusting of the eyelids in the morning. This is caused by mucus drainage during the night. You may also use saline irrigating solution or artificial tears to rinse away mucus in the eye. Do not put a patch over the eye.    Wash your hands before and after touching the infected eye. This is to prevent spreading the infection to the other eye, and to other people. Don't share your towels or washcloths with others.    You may use acetaminophen or ibuprofen to control pain, unless another medicine was prescribed. Talk with your healthcare provider before using these  medicines if you have chronic liver or kidney disease. Also talk with your provider if you have ever had a stomach ulcer or digestive bleeding.    Don't wear contact lenses until your eyes have healed and all symptoms are gone.    Follow-up care  Follow up with your healthcare provider, or as advised.  When to seek medical advice  Call your healthcare provider right away if any of these occur:    Worsening vision    Increasing pain in the eye    Increasing swelling or redness of the eyelid    Redness spreading around the eye  [employer] last reviewed this educational content on 4/1/2020 2000-2021 [employer]. All rights reserved. This information is not intended as a substitute for professional medical care. Always follow your healthcare professional's instructions.

## 2022-03-04 ENCOUNTER — MYC REFILL (OUTPATIENT)
Dept: FAMILY MEDICINE | Facility: CLINIC | Age: 57
End: 2022-03-04
Payer: COMMERCIAL

## 2022-03-04 DIAGNOSIS — I10 ESSENTIAL HYPERTENSION: ICD-10-CM

## 2022-03-07 RX ORDER — HYDROCHLOROTHIAZIDE 25 MG/1
25 TABLET ORAL DAILY
Qty: 90 TABLET | Refills: 1 | Status: SHIPPED | OUTPATIENT
Start: 2022-03-07 | End: 2022-09-30

## 2022-03-07 NOTE — TELEPHONE ENCOUNTER
Prescription approved per [facility] Refill Protocol.    Nirmala AARON RN  EP Triage

## 2022-07-02 ENCOUNTER — HEALTH MAINTENANCE LETTER (OUTPATIENT)
Age: 57
End: 2022-07-02

## 2022-07-28 ENCOUNTER — OFFICE VISIT (OUTPATIENT)
Dept: OBGYN | Facility: CLINIC | Age: 57
End: 2022-07-28
Payer: COMMERCIAL

## 2022-07-28 VITALS
DIASTOLIC BLOOD PRESSURE: 72 MMHG | HEIGHT: 64 IN | SYSTOLIC BLOOD PRESSURE: 124 MMHG | BODY MASS INDEX: 34.35 KG/M2 | WEIGHT: 201.2 LBS

## 2022-07-28 DIAGNOSIS — N64.4 BREAST PAIN: Primary | ICD-10-CM

## 2022-07-28 PROCEDURE — 99213 OFFICE O/P EST LOW 20 MIN: CPT | Performed by: NURSE PRACTITIONER

## 2022-07-28 NOTE — PROGRESS NOTES
SUBJECTIVE:                                                   Taylor Baeza is a 57 year old female who presents to clinic today for the following health issue(s):  Patient presents with:  Breast Pain: Patient states having pain in the left breast for the last two weeks.  Patient denies discharge.    Additional information:    HPI: Taylor comes into clinic today with 2 weeks on L breast pain. Pain is located at 3-4 o'clock up into the armpit area. Reports dull, constant pain. Pain has not increased or lessened in the past 2 weeks. Has taken Ibuprofen, which has helped sometimes.      No LMP recorded (lmp unknown). Patient is postmenopausal..     Patient is sexually active, .  Using none for contraception.    reports that she has never smoked. She has never used smokeless tobacco.    STD testing offered?  Declined    Health maintenance updated:  yes    Today's PHQ-2 Score:   PHQ-2 (  [employer]) 2021   Q1: Little interest or pleasure in doing things 1   Q2: Feeling down, depressed or hopeless 1   PHQ-2 Score 2   PHQ-2 Total Score (12-17 Years)- Positive if 3 or more points; Administer PHQ-A if positive 2   Q1: Little interest or pleasure in doing things Several days   Q2: Feeling down, depressed or hopeless Not at all   PHQ-2 Score 1     Today's PHQ-9 Score:   PHQ-9 SCORE 2022   PHQ-9 Total Score -   PHQ-9 Total Score MyChart 0   PHQ-9 Total Score 0     Today's GAVIN-7 Score:   GAVIN-7 SCORE 2021   Total Score -   Total Score 1 (minimal anxiety)   Total Score 1       Problem list and histories reviewed & adjusted, as indicated.  Additional history: as documented.    Patient Active Problem List   Diagnosis     Hypothyroidism     Morbid obesity (H)     Rosacea     CARDIOVASCULAR SCREENING; LDL GOAL LESS THAN 130     Plantar fasciitis     Anxiety     Enthesopathy of ankle and tarsus     Snoring     GERD (gastroesophageal reflux disease)     Hypertension, goal below 140/90     Panic attacks     Vitamin D  deficiency     Paresthesia     Hyperglycemia     Sleep disorder     Mild major depression (H)     Cystic acne     Other specified hypothyroidism     Hyperlipidemia LDL goal <130     Hypertension     Hypothyroid     Other insomnia     Idiopathic neuropathy     BMI 35.0-35.9,adult     Diverticulosis of colon     Past Surgical History:   Procedure Laterality Date     BREAST SURGERY      breast reduction     COLONOSCOPY N/A 10/19/2017    Procedure: COMBINED COLONOSCOPY, SINGLE OR MULTIPLE BIOPSY/POLYPECTOMY BY BIOPSY;  colonoscopy;  Surgeon: Marquise Haskins MD;  Location:  GI     TONSILLECTOMY  1970      Social History     Tobacco Use     Smoking status: Never Smoker     Smokeless tobacco: Never Used   Substance Use Topics     Alcohol use: Yes     Alcohol/week: 0.0 standard drinks     Comment: 2 x week       Problem (# of Occurrences) Relation (Name,Age of Onset)    Diabetes (2) Sister, Maternal Grandmother    Hypertension (2) Father, Mother    Thyroid Disease (1) Father       Negative family history of: Breast Cancer, Cancer - colorectal, C.A.D.            Current Outpatient Medications   Medication Sig     buPROPion (WELLBUTRIN XL) 150 MG 24 hr tablet Take 1 tablet (150 mg) by mouth every morning Along with 300mg for 450mg total dose     buPROPion (WELLBUTRIN XL) 300 MG 24 hr tablet Take 1 tablet (300 mg) by mouth every morning (along with 150mg tab)     citalopram (CELEXA) 20 MG tablet Take 1 tablet (20 mg) by mouth daily     hydrochlorothiazide (HYDRODIURIL) 25 MG tablet Take 1 tablet (25 mg) by mouth daily     levothyroxine (SYNTHROID/LEVOTHROID) 112 MCG tablet Take 1 tablet (112 mcg) by mouth daily     metroNIDAZOLE (METROCREAM) 0.75 % external cream Apply topically 2 times daily     ofloxacin (OCUFLOX) 0.3 % ophthalmic solution Place 1-2 drops into the right eye every 4 hours     ALPRAZolam (XANAX) 0.25 MG tablet Take 1 tablet (0.25 mg) by mouth 3 times daily as needed for anxiety (Patient not taking:  "Reported on 7/28/2022)     No current facility-administered medications for this visit.     Allergies   Allergen Reactions     No Known Allergies        ROS:  12 point review of systems negative other than symptoms noted below or in the HPI.      OBJECTIVE:     /72   Ht 1.626 m (5' 4\")   Wt 91.3 kg (201 lb 3.2 oz)   LMP  (LMP Unknown)   Breastfeeding No   BMI 34.54 kg/m    Body mass index is 34.54 kg/m .    Exam:  Constitutional:  Appearance: Well nourished, well developed alert, in no acute distress  Breasts:  Inspection of Breasts:  Symmetric bilaterally.  No puckering.  No skin changes.  Palpation of Breasts and Axillae:  No masses present on palpation, no breast tenderness Axillary Lymph Nodes:  No lymphadenopathy present. Felt like L pectoral muscle was tight/spasming.    In-Clinic Test Results:  No results found for this or any previous visit (from the past 24 hour(s)).    ASSESSMENT/PLAN:                                                        ICD-10-CM    1. Breast pain  N64.4 US Breast Left Complete 4 Quadrants     CANCELED: US Breast Left Complete 4 Quadrants         Referral placed for [facility]    I was present with the student who participated in the service and in the documentation of the note. I have verified the history and personally performed the physical exam and medical decision-making. I agree with the assessment and plan of care as documented in the note. MIGUEL Lange CNP  July 28, 2022          MIGUEL Lange CNP  [facility]    I was present with the [facility] student who participated in the documentation of the services provided. I have verified the history and personally performed the physical exam and medical decision making, as documented by the student and edited by me.     YOAN Dorsey Student    07/28/22   "

## 2022-08-01 ENCOUNTER — HOSPITAL ENCOUNTER (OUTPATIENT)
Dept: MAMMOGRAPHY | Facility: CLINIC | Age: 57
Discharge: HOME | End: 2022-08-01
Attending: NURSE PRACTITIONER
Payer: COMMERCIAL

## 2022-08-01 ENCOUNTER — HOSPITAL ENCOUNTER (OUTPATIENT)
Dept: ULTRASOUND IMAGING | Facility: CLINIC | Age: 57
Discharge: HOME | End: 2022-08-01
Attending: NURSE PRACTITIONER
Payer: COMMERCIAL

## 2022-08-01 DIAGNOSIS — N64.4 BREAST PAIN: ICD-10-CM

## 2022-08-01 PROCEDURE — 77062 BREAST TOMOSYNTHESIS BI: CPT

## 2022-08-01 PROCEDURE — 76642 ULTRASOUND BREAST LIMITED: CPT | Mod: LT

## 2022-08-04 ENCOUNTER — OFFICE VISIT (OUTPATIENT)
Dept: FAMILY MEDICINE | Facility: CLINIC | Age: 57
End: 2022-08-04
Payer: COMMERCIAL

## 2022-08-04 VITALS
RESPIRATION RATE: 16 BRPM | WEIGHT: 198.6 LBS | HEART RATE: 80 BPM | SYSTOLIC BLOOD PRESSURE: 122 MMHG | TEMPERATURE: 97.9 F | BODY MASS INDEX: 33.9 KG/M2 | DIASTOLIC BLOOD PRESSURE: 70 MMHG | OXYGEN SATURATION: 97 % | HEIGHT: 64 IN

## 2022-08-04 DIAGNOSIS — F41.9 ANXIETY: ICD-10-CM

## 2022-08-04 DIAGNOSIS — G47.09 OTHER INSOMNIA: ICD-10-CM

## 2022-08-04 DIAGNOSIS — E03.9 HYPOTHYROIDISM, UNSPECIFIED TYPE: ICD-10-CM

## 2022-08-04 DIAGNOSIS — F32.0 MILD MAJOR DEPRESSION (H): ICD-10-CM

## 2022-08-04 DIAGNOSIS — F41.0 PANIC ATTACKS: ICD-10-CM

## 2022-08-04 DIAGNOSIS — Z12.4 CERVICAL CANCER SCREENING: Primary | ICD-10-CM

## 2022-08-04 PROCEDURE — 36415 COLL VENOUS BLD VENIPUNCTURE: CPT | Performed by: FAMILY MEDICINE

## 2022-08-04 PROCEDURE — 99214 OFFICE O/P EST MOD 30 MIN: CPT | Mod: 25 | Performed by: FAMILY MEDICINE

## 2022-08-04 PROCEDURE — 84443 ASSAY THYROID STIM HORMONE: CPT | Performed by: FAMILY MEDICINE

## 2022-08-04 PROCEDURE — 90715 TDAP VACCINE 7 YRS/> IM: CPT | Performed by: FAMILY MEDICINE

## 2022-08-04 PROCEDURE — 90471 IMMUNIZATION ADMIN: CPT | Performed by: FAMILY MEDICINE

## 2022-08-04 RX ORDER — ALPRAZOLAM 0.25 MG
0.25 TABLET ORAL 3 TIMES DAILY PRN
Qty: 10 TABLET | Refills: 0 | Status: SHIPPED | OUTPATIENT
Start: 2022-08-04 | End: 2023-04-18

## 2022-08-04 RX ORDER — LEVOTHYROXINE SODIUM 112 UG/1
112 TABLET ORAL DAILY
Qty: 90 TABLET | Refills: 3 | Status: SHIPPED | OUTPATIENT
Start: 2022-08-04 | End: 2023-04-18

## 2022-08-04 RX ORDER — CITALOPRAM HYDROBROMIDE 20 MG/1
20 TABLET ORAL DAILY
Qty: 90 TABLET | Refills: 0 | Status: SHIPPED | OUTPATIENT
Start: 2022-08-04 | End: 2023-02-26

## 2022-08-04 RX ORDER — BUPROPION HYDROCHLORIDE 300 MG/1
TABLET ORAL
Qty: 90 TABLET | Refills: 0 | COMMUNITY
Start: 2022-08-04 | End: 2022-11-30

## 2022-08-04 RX ORDER — BUPROPION HYDROCHLORIDE 150 MG/1
TABLET ORAL
Qty: 30 TABLET | Refills: 1 | Status: SHIPPED | OUTPATIENT
Start: 2022-08-04 | End: 2023-03-23

## 2022-08-04 ASSESSMENT — PATIENT HEALTH QUESTIONNAIRE - PHQ9: SUM OF ALL RESPONSES TO PHQ QUESTIONS 1-9: 3

## 2022-08-04 ASSESSMENT — ANXIETY QUESTIONNAIRES
GAD7 TOTAL SCORE: 5
7. FEELING AFRAID AS IF SOMETHING AWFUL MIGHT HAPPEN: SEVERAL DAYS
4. TROUBLE RELAXING: SEVERAL DAYS
1. FEELING NERVOUS, ANXIOUS, OR ON EDGE: SEVERAL DAYS
3. WORRYING TOO MUCH ABOUT DIFFERENT THINGS: SEVERAL DAYS
6. BECOMING EASILY ANNOYED OR IRRITABLE: NOT AT ALL
GAD7 TOTAL SCORE: 5
GAD7 TOTAL SCORE: 5
7. FEELING AFRAID AS IF SOMETHING AWFUL MIGHT HAPPEN: SEVERAL DAYS
2. NOT BEING ABLE TO STOP OR CONTROL WORRYING: SEVERAL DAYS
5. BEING SO RESTLESS THAT IT IS HARD TO SIT STILL: NOT AT ALL

## 2022-08-04 ASSESSMENT — PAIN SCALES - GENERAL: PAINLEVEL: NO PAIN (0)

## 2022-08-04 NOTE — PROGRESS NOTES
"  Assessment & Plan       (F32.0) Mild major depression (H)  Comment:   Plan: buPROPion (WELLBUTRIN XL) 150 MG 24 hr tablet,         buPROPion (WELLBUTRIN XL) 300 MG 24 hr tablet,         citalopram (CELEXA) 20 MG tablet            (F41.9) Anxiety  Comment:   Plan: buPROPion (WELLBUTRIN XL) 150 MG 24 hr tablet,         citalopram (CELEXA) 20 MG tablet            (F41.0) Panic attacks  Comment: stable , rarely needs xanax   Plan: buPROPion (WELLBUTRIN XL) 150 MG 24 hr tablet,         citalopram (CELEXA) 20 MG tablet, ALPRAZolam         (XANAX) 0.25 MG tablet            (G47.09) Other insomnia  Comment: improved   Plan: citalopram (CELEXA) 20 MG tablet            Discussed cares, talked about signs and symptoms of anxiety/ depression and treatment options. Willing to continue the same med's for now, although she can slowly consider reducing Wellbutrin dose so at least she can herself off 150 pill slowly over several weeks and stay on Wellbutrin 300 mg dose if she has no issue or problem staying on lower dose. \  She is willing to give a try whenever she feels ready. . Pros/ cons of med's discussed . encouraged to see  to help and referral given . spent sometimes counseling patient. Follow up in 2-3 months, sooner if problem.     (E03.9) Hypothyroidism, unspecified type  Comment:   Plan: levothyroxine (SYNTHROID/LEVOTHROID) 112 MCG         tablet, TSH with free T4 reflex          Check labs. refill sent.Cares and  treatment discussed follow. up if problem   Patient expressed understanding and agreement with treatment plan. All patient's questions were answered, will let me know if has more later.  Medications: Rx's: Reviewed the potential side effects/complications of medications prescribed.       BMI:   Estimated body mass index is 34.09 kg/m  as calculated from the following:    Height as of this encounter: 1.626 m (5' 4\").    Weight as of this encounter: 90.1 kg (198 lb 9.6 oz).   Weight management plan: " Discussed healthy diet and exercise guidelines      Ashley Chavez MD  [facility] GAYLA Vazquez is a 57 year old, presenting for the following health issues:  No chief complaint on file.      History of Present Illness       Mental Health Follow-up:  Patient presents to follow-up on Anxiety.    Patient's anxiety since last visit has been:  Better  The patient is having other symptoms associated with anxiety.  Any significant life events: grief or loss  Patient is feeling anxious or having panic attacks.  Patient has no concerns about alcohol or drug use.    Hypothyroidism:     Since last visit, patient describes the following symptoms::  Anxiety and Fatigue    She eats 2-3 servings of fruits and vegetables daily.She consumes 0 sweetened beverage(s) daily.She exercises with enough effort to increase her heart rate 10 to 19 minutes per day.  She exercises with enough effort to increase her heart rate 3 or less days per week.   She is taking medications regularly.  Today's GAVIN-7 Score: 5       Depression and Anxiety Follow-Up    How are you doing with your depression since your last visit? Improved over all doing much better no problem taking med's has been on this dose for a while     How are you doing with your anxiety since your last visit?  No change    Are you having other symptoms that might be associated with depression or anxiety? No concerning sx. Has occasional panic attack in certain situation and could use refill on xanax to use as needed     Over all mood is much better with current med's,     Have you had a significant life event? No     Do you have any concerns with your use of alcohol or other drugs? No    Social History     Tobacco Use     Smoking status: Never Smoker     Smokeless tobacco: Never Used   Substance Use Topics     Alcohol use: Yes     Alcohol/week: 0.0 standard drinks     Comment: 2 x week      Drug use: No     PHQ 10/19/2021 7/13/2022 8/4/2022    PHQ-9 Total Score 4 0 3   Q9: Thoughts of better off dead/self-harm past 2 weeks Not at all Not at all Not at all     GAVIN-7 SCORE 6/1/2021 6/29/2021 8/4/2022   Total Score - - -   Total Score - 1 (minimal anxiety) 5 (mild anxiety)   Total Score 4 1 5     Last PHQ-9 8/4/2022   1.  Little interest or pleasure in doing things 0   2.  Feeling down, depressed, or hopeless 0   3.  Trouble falling or staying asleep, or sleeping too much 1   4.  Feeling tired or having little energy 1   5.  Poor appetite or overeating 1   6.  Feeling bad about yourself 0   7.  Trouble concentrating 0   8.  Moving slowly or restless 0   Q9: Thoughts of better off dead/self-harm past 2 weeks 0   PHQ-9 Total Score 3   Difficulty at work, home, or with people Not difficult at all     GAVIN-7  8/4/2022   1. Feeling nervous, anxious, or on edge 1   2. Not being able to stop or control worrying 1   3. Worrying too much about different things 1   4. Trouble relaxing 1   5. Being so restless that it is hard to sit still 0   6. Becoming easily annoyed or irritable 0   7. Feeling afraid, as if something awful might happen 1   GAVIN-7 Total Score 5   If you checked any problems, how difficult have they made it for you to do your work, take care of things at home, or get along with other people? -             Review of Systems   Constitutional, HEENT, cardiovascular, pulmonary, GI, , musculoskeletal, neuro, skin, endocrine and psych systems are negative, except as otherwise noted.      Objective    LMP  (LMP Unknown)   There is no height or weight on file to calculate BMI.  Physical Exam   GENERAL: healthy, alert and no distress  EYES: Eyes grossly normal to inspection,  HENT: ear canals and TM's normal, nose and mouth without ulcers or lesions  RESP: lungs clear to auscultation - no rales, rhonchi or wheezes  CV: regular rate and rhythm, normal S1 S2, no S3 or S4,   ABDOMEN: soft, nontender, no hepatosplenomegaly, no masses and bowel sounds normal  MS:  no edema  NEURO: Normal strength and tone, mentation intact and speech normal  PSYCH: mentation appears normal, affect normal/    ..

## 2022-08-05 LAB — TSH SERPL DL<=0.005 MIU/L-ACNC: 1.82 MU/L (ref 0.4–4)

## 2022-09-19 ENCOUNTER — MYC MEDICAL ADVICE (OUTPATIENT)
Dept: FAMILY MEDICINE | Facility: CLINIC | Age: 57
End: 2022-09-19

## 2022-09-19 DIAGNOSIS — L30.9 DERMATITIS: Primary | ICD-10-CM

## 2022-09-26 RX ORDER — NEOMYCIN SULFATE, POLYMYXIN B SULFATE, HYDROCORTISONE 3.5; 10000; 1 MG/ML; [USP'U]/ML; MG/ML
3 SOLUTION/ DROPS AURICULAR (OTIC) 3 TIMES DAILY PRN
Qty: 10 ML | Refills: 0 | Status: SHIPPED | OUTPATIENT
Start: 2022-09-26 | End: 2023-06-06

## 2022-09-26 NOTE — TELEPHONE ENCOUNTER
Attempt #1    MyChart response sent to pt to inform that otic drops have been sent to [facility].    Rita Shah,  [facility]

## 2022-09-26 NOTE — TELEPHONE ENCOUNTER
Please see [employer] message and advise.     Pharmacy is pended.    Kyleigh Pablo RN  [facility] Triage Team

## 2022-09-27 NOTE — TELEPHONE ENCOUNTER
[employer] Message Last Read in [employer]   9/26/2022  1:33 PM by Taylor Shah,  [facility]

## 2022-09-28 DIAGNOSIS — I10 ESSENTIAL HYPERTENSION: ICD-10-CM

## 2022-09-30 RX ORDER — HYDROCHLOROTHIAZIDE 25 MG/1
25 TABLET ORAL DAILY
Qty: 90 TABLET | Refills: 0 | Status: SHIPPED | OUTPATIENT
Start: 2022-09-30 | End: 2022-12-13

## 2022-09-30 NOTE — TELEPHONE ENCOUNTER
90 day evelyn refill sent.   Patient needs additional labs for future refills.     Hang Peralta PA-C

## 2022-09-30 NOTE — TELEPHONE ENCOUNTER
Routing refill request to provider for review/approval because:  Labs not current:    Sodium   Date Value Ref Range Status   03/29/2021 138 133 - 144 mmol/L Final      Potassium   Date Value Ref Range Status   03/29/2021 3.7 3.4 - 5.3 mmol/L Final      Creatinine   Date Value Ref Range Status   03/29/2021 0.78 0.52 - 1.04 mg/dL Final    Margret Reynolds RN

## 2022-11-16 ENCOUNTER — NURSE TRIAGE (OUTPATIENT)
Dept: FAMILY MEDICINE | Facility: CLINIC | Age: 57
End: 2022-11-16

## 2022-11-16 NOTE — TELEPHONE ENCOUNTER
SITUATION:   Patient calling in for right abdomen pain for the past 1.5 weeks. The pain is most noticeable in the morning. Patient thought it was related to stretching.   After the patient goes to the bathroom in the morning the patient felt relief.   Pain is rated at a 4-5/10, near the right hip, dull ache, pain radiates to the lower back.   Denies concerns with urination, abnormal bleeding, abnormal stools (soft and normal stools for patient). Patient does have her appendix.      BACKGROUND:   Patient has not had this pain before.     HOME TREATMENTS:  Fiber-  Heating Pad  Increase Fluids - 8 cups in a day       NURSE RECOMMENDATION:         PLAN:   Advised sameday appointment. If the patient is unable to get a same day appointment. Then will go to . Discussed when to go to ER. If symptoms worsen patient will go to the ER.     Next 5 appointments (look out 90 days)    Nov 18, 2022 11:00 AM  (Arrive by 10:40 AM)  Provider Visit with Lacey Waite PA-C  [facility] ([facility] - [address] [address] [phone]           RECOMMENDED DISPOSITION:  Same day appointment.   Will comply with recommendation: yes   If further questions/concerns or if Sx do not improve, worsen or new Sx develop, call your PCP or [facility] as soon as possible.    NOTES:  Disposition was determined by the first positive assessment question, therefore all previous assessment questions were negative.     TAD VegasN, RN, PHN  [facility]  November 16, 2022    Reason for Disposition    MODERATE pain (e.g., interferes with normal activities that comes and goes (cramps) lasts > 24 hours  (Exception: Pain with Vomiting or Diarrhea - see that Protocol.)    Additional Information    Negative: Passed out (i.e., fainted, collapsed and was not responding)    Negative: Shock suspected (e.g., cold/pale/clammy skin, too  weak to stand, low BP, rapid pulse)    Negative: Sounds like a life-threatening emergency to the triager    Negative: Chest pain    Negative: Pain is mainly in upper abdomen (if needed ask: 'is it mainly above the belly button?')    Negative: Abdominal pain and pregnant < 20 weeks    Negative: Abdominal pain and pregnant 20 or more weeks    Negative: SEVERE abdominal pain (e.g., excruciating)    Negative: Vomiting red blood or black (coffee ground) material    Negative: Bloody, black, or tarry bowel movements  (Exception: Chronic-unchanged black-grey bowel movements and is taking iron pills or Pepto-Bismol.)    Negative: Constant abdominal pain lasting > 2 hours    Negative: Vomiting bile (green color)    Negative: Patient sounds very sick or weak to the triager    Negative: Vomiting and abdomen looks much more swollen than usual    Negative: White of the eyes have turned yellow (i.e., jaundice)    Negative: Blood in urine (red, pink, or tea-colored)    Negative: Fever > 103 F (39.4 C)    Negative: Fever > 101 F (38.3 C) and over 60 years of age    Negative: Fever > 100.0 F (37.8 C) and has diabetes mellitus or a weak immune system (e.g., HIV positive, cancer chemotherapy, organ transplant, splenectomy, chronic steroids)    Negative: Fever > 100.0 F (37.8 C) and bedridden (e.g., nursing home patient, stroke, chronic illness, recovering from surgery)    Negative: Pregnant or could be pregnant (i.e., missed last menstrual period)    Protocols used: ABDOMINAL PAIN - FEMALE-A-[place]

## 2022-11-18 ENCOUNTER — OFFICE VISIT (OUTPATIENT)
Dept: FAMILY MEDICINE | Facility: CLINIC | Age: 57
End: 2022-11-18
Payer: COMMERCIAL

## 2022-11-18 VITALS
HEART RATE: 92 BPM | TEMPERATURE: 97.1 F | OXYGEN SATURATION: 97 % | BODY MASS INDEX: 35.61 KG/M2 | WEIGHT: 201 LBS | HEIGHT: 63 IN | DIASTOLIC BLOOD PRESSURE: 89 MMHG | SYSTOLIC BLOOD PRESSURE: 133 MMHG

## 2022-11-18 DIAGNOSIS — K58.8 OTHER IRRITABLE BOWEL SYNDROME: ICD-10-CM

## 2022-11-18 DIAGNOSIS — S76.011A STRAIN OF RIGHT PSOAS MUSCLE, INITIAL ENCOUNTER: Primary | ICD-10-CM

## 2022-11-18 DIAGNOSIS — S86.811A STRAIN OF CALF MUSCLE, RIGHT, INITIAL ENCOUNTER: ICD-10-CM

## 2022-11-18 DIAGNOSIS — M72.2 PLANTAR FASCIITIS: ICD-10-CM

## 2022-11-18 PROCEDURE — 99213 OFFICE O/P EST LOW 20 MIN: CPT | Performed by: PHYSICIAN ASSISTANT

## 2022-11-18 ASSESSMENT — PAIN SCALES - GENERAL: PAINLEVEL: NO PAIN (0)

## 2022-11-18 NOTE — PROGRESS NOTES
Assessment & Plan       ICD-10-CM    1. Strain of right psoas muscle, initial encounter  S76.011A Physical Therapy Referral      2. Plantar fasciitis  M72.2 Physical Therapy Referral      3. Strain of calf muscle, right, initial encounter  S86.811A Physical Therapy Referral      4. Other irritable bowel syndrome  K58.8         - Suspect tightness of psoas muscle as etiology for majority of sx, based on exam and reported symptoms. May be related to ongoing tightness and pain down RLE. Referred to PT for further evaluation.  - Mild flare of IBS may be contributing, but doubt it is major . Reassured patient of benign abdominal exam. Recommend continued dietary changes and OTC medication use prn.    30 minutes spent on the date of the encounter doing chart review, history and exam, documentation and further activities as noted above    Return in about 2 weeks (around 12/2/2022), or if symptoms worsen or fail to improve.    [facility] GAYLA Vazquez is a 57 year old, presenting for the following health issues:  Abdominal Pain      History of Present Illness       Reason for visit:  Pain on right side  Symptom onset:  3-7 days ago  Symptom intensity:  Moderate  Symptom progression:  Improving    She eats 2-3 servings of fruits and vegetables daily.She consumes 0 sweetened beverage(s) daily.She exercises with enough effort to increase her heart rate 9 or less minutes per day.  She exercises with enough effort to increase her heart rate 3 or less days per week.   She is taking medications regularly.     - Patient developed Rt sided lower abdominal/flank/back pain x1-1.5 wks. Waxes and wanes. Notes flare of Rt plantar fasciitis and calf tightness recently; has been doing more stretching and exercises to help with this. History IBS, endorses poor diet recently that may be triggering symptoms; has increased fiber intake, using Miralax prn with good movement of  "bowels.    Review of Systems   Constitutional, HEENT, cardiovascular, pulmonary, GI, , musculoskeletal, neuro, skin, endocrine and psych systems are negative, except as otherwise noted.      Objective    /89   Pulse 92   Temp 97.1  F (36.2  C) (Temporal)   Ht 1.607 m (5' 3.25\")   Wt 91.2 kg (201 lb)   LMP  (LMP Unknown)   SpO2 97%   BMI 35.32 kg/m    Body mass index is 35.32 kg/m .  Physical Exam   GENERAL:  WDWN, no acute distress  PSYCH: pleasant, cooperative  EYES: no discharge, no injection  HENT:  Normocephalic. Moist mucus membranes.  NECK:  Supple, symmetric  ABDOMEN:  Soft, non-tender, non-distended. Bowel sounds are present. No palpable masses  EXTREMITIES:  No gross deformities, moves all 4 limbs spontaneously, no peripheral edema  SKIN:  Warm and dry, no rash or suspicious lesions    NEUROLOGIC:  alert, sensation grossly intact.                    "

## 2022-11-27 DIAGNOSIS — F32.0 MILD MAJOR DEPRESSION (H): ICD-10-CM

## 2022-11-30 RX ORDER — BUPROPION HYDROCHLORIDE 300 MG/1
TABLET ORAL
Qty: 90 TABLET | Refills: 0 | Status: SHIPPED | OUTPATIENT
Start: 2022-11-30 | End: 2023-03-15

## 2022-11-30 NOTE — TELEPHONE ENCOUNTER
PHQ-9 and GAD7 Scores  12/19/2020   3/29/2021   6/1/2021   6/29/2021   10/19/2021   7/13/2022   8/4/2022    PHQ9 TOTAL SCORE 18 (Moderately severe depression) - - 2 (Minimal depression) 4 (Minimal depression) 0 -   PHQ-9 Total Score 18 20 17 2 4 0 3   GAVIN-7 Total Score - 20 4 1 - - 5    12/19/2020 3/29/2021 6/1/2021 6/29/2021 10/19/2021 7/13/2022 8/4/2022     1/13/23 has appointment with Dr. Waite.    Last seen 11/18/22 Dr. Waite.    Refilled per [facility] protocol.  Sarai Mccann RN

## 2022-12-13 ENCOUNTER — VIRTUAL VISIT (OUTPATIENT)
Dept: FAMILY MEDICINE | Facility: CLINIC | Age: 57
End: 2022-12-13
Payer: COMMERCIAL

## 2022-12-13 DIAGNOSIS — M25.551 HIP PAIN, RIGHT: Primary | ICD-10-CM

## 2022-12-13 DIAGNOSIS — E66.01 MORBID OBESITY (H): ICD-10-CM

## 2022-12-13 DIAGNOSIS — I10 HYPERTENSION, GOAL BELOW 140/90: ICD-10-CM

## 2022-12-13 DIAGNOSIS — I10 ESSENTIAL HYPERTENSION: ICD-10-CM

## 2022-12-13 DIAGNOSIS — K58.9 IRRITABLE BOWEL SYNDROME, UNSPECIFIED TYPE: ICD-10-CM

## 2022-12-13 DIAGNOSIS — Z13.220 LIPID SCREENING: ICD-10-CM

## 2022-12-13 PROCEDURE — 99214 OFFICE O/P EST MOD 30 MIN: CPT | Mod: 95 | Performed by: FAMILY MEDICINE

## 2022-12-13 RX ORDER — HYDROCHLOROTHIAZIDE 25 MG/1
25 TABLET ORAL DAILY
Qty: 90 TABLET | Refills: 0 | Status: SHIPPED | OUTPATIENT
Start: 2022-12-13 | End: 2023-04-18

## 2022-12-13 ASSESSMENT — ENCOUNTER SYMPTOMS: BACK PAIN: 1

## 2022-12-13 NOTE — PROGRESS NOTES
"Taylor is a 57 year old who is being evaluated via a billable video visit.      How would you like to obtain your AVS? MyChart  If the video visit is dropped, the invitation should be resent by: Text to cell phone: [phone]  Will anyone else be joining your video visit? No  {If patient encounters technical issues they should call [phone] :018102}        {PROVIDER CHARTING PREFERENCE:668367}    Subjective   Taylor is a 57 year old{ACCOMPANIED BY STATEMENT (Optional):336887}, presenting for the following health issues:  No chief complaint on file.      History of Present Illness       Reason for visit:  Pain on lower right side and around to my back. Seems related to my bowel movements. It comes and goes but is really sharp Had it almost 2 months now.    She eats 2-3 servings of fruits and vegetables daily.She consumes 0 sweetened beverage(s) daily.She exercises with enough effort to increase her heart rate 20 to 29 minutes per day.  She exercises with enough effort to increase her heart rate 4 days per week.   She is taking medications regularly.       {SUPERLIST (Optional):066503}  {additonal problems for provider to add (Optional):928266}    Review of Systems   {ROS COMP (Optional):079665}      Objective           Vitals:  No vitals were obtained today due to virtual visit.    Physical Exam   {video visit exam brief selected:947356::\"GENERAL: Healthy, alert and no distress\",\"EYES: Eyes grossly normal to inspection.  No discharge or erythema, or obvious scleral/conjunctival abnormalities.\",\"RESP: No audible wheeze, cough, or visible cyanosis.  No visible retractions or increased work of breathing.  \",\"SKIN: Visible skin clear. No significant rash, abnormal pigmentation or lesions.\",\"NEURO: Cranial nerves grossly intact.  Mentation and speech appropriate for age.\",\"PSYCH: Mentation appears normal, affect normal/bright, judgement and insight intact, normal speech and appearance well-groomed.\"}    {Diagnostic Test " "Results (Optional):941464}    {AMBULATORY ATTESTATION (Optional):608787}        Video-Visit Details    Video Start Time: {video visit start/end time for provider to select:576630}    Type of service:  Video Visit    Video End Time:{video visit start/end time for provider to select:979103}    Originating Location (pt. Location): {video visit patient location:962742::\"Home\"}    {PROVIDER LOCATION On-site should be selected for visits conducted from your clinic location or adjoining [facility] hospital, academic office, or other nearby [facility] building. Off-site should be selected for all other provider locations, including home:334220}    Distant Location (provider location):  {virtual location provider:056836}    Platform used for Video Visit: {Virtual Visit Platforms:552557::\"[employer]\"}    "

## 2022-12-13 NOTE — PATIENT INSTRUCTIONS
Check labs  Take medications as directed.  Treatment and symptomatic cares discussed   Follow up and follow up if no improvement if after PT

## 2022-12-13 NOTE — PROGRESS NOTES
Taylor is a 57 year old who is being evaluated via a billable telephone visit.      What phone number would you like to be contacted at? [phone]  How would you like to obtain your AVS? Joseph  Distant Location (provider location):  On-site    Assessment & Plan     (B62.368) Hip pain, right  (primary encounter diagnosis)  Comment:  Rt  groin pain/ back / hip stiffness , also suspect possible osteoarthritis?   Plan: XR Hip Right 2-3 Views     discussed back and hip  cares and symptomatic treatment including  adequate pain control, heat,  stretches etc.    Comfortable taking OTC pain med's as needed and  Willing to proceed with  physical therapy       Order placed for rt hip xray. She can schedule if not better        she will do follow up if no improvement or problem.    Consider further evaluation per ortho if needed  if needed.         (K58.9) Irritable bowel syndrome, unspecified type  Comment: on and off diarrhea - doubt that is the cause of her groin - hip pain    Plan:   stable .discussed keep bm regula/ probiotic etc/ f/u if problem                 (I10) Hypertension, goal below 140/90  Comment:   Plan: COMPREHENSIVE METABOLIC PANEL,         hydrochlorothiazide (HYDRODIURIL) 25 MG tablet            (I10) Essential hypertension  Comment:   Plan: COMPREHENSIVE METABOLIC PANEL,         hydrochlorothiazide (HYDRODIURIL) 25 MG tablet          BP in adequate control. Discussed cares, low fat low salt  diet etc. Check labs, call pt with results. Refill given       . encouraged home BP monitoring. Follow up recheck in 6 months, sooner if problem.     (Z13.220) Lipid screening  Comment: will do fasting   Plan: Lipid panel reflex to direct LDL Fasting            (E66.01) Morbid obesity (H)  Comment:   Plan: Healthy diet and exercise reviewed.Risks of obesity discussed.  Encourage exercise.                Check labs. refill sent.Cares and  treatment discussed. follow up if problem   Patient expressed understanding and  agreement with treatment plan. All patient's questions were answered, will let me know if has more later.  Medications: Rx's: Reviewed the potential side effects/complications of medications prescribed.       Ashley Chavez MD  [facility] GAYLA Vazquez is a 57 year old, presenting for the following health issues:  Back Pain (Pain on lower right side and around to my back. Seems related to my bowel movements. It comes and goes but is really sharp Had it almost 2 months now.)      Back Pain     History of Present Illness       Reason for visit:  Pain on lower right side and around to my back. Seems related to my bowel movements. It comes and goes but is really sharp Had it almost 2 months now.    She eats 2-3 servings of fruits and vegetables daily.She consumes 0 sweetened beverage(s) daily.She exercises with enough effort to increase her heart rate 20 to 29 minutes per day.  She exercises with enough effort to increase her heart rate 4 days per week.   She is taking medications regularly.   no urinary sx or no constipation has ibs's more diarrhea sometimes. So not sure if related to bm but has ongoing since last visit and same pain    Feels like more on rt groin area and goes to rt side of back and hip area.   No new urinary or gi sx.it feels like dull ache bending forward, walking is also uncomfortable , it improves wit Advil and heat pack back is stiff on and off and goes around back and side of thigh. No numbness or tingling but sometime rt leg feels sore bc sh also has planter fascitis. Has  PT appointment coming up on 12/20 to evaluate her rt leg , but  just wanted to get checked. She thinsk some day it is really bad some days not as bad, but concerned bc o recurrent sx. It does bothered at night certain position , also moving around can be around    no urinary sx or no constipation.  has ibs's more diarrhea sometimes, it feels better after bm no bulge etc but worries about  possible hernia . So not sure if related to bm but has ongoing since last visit and same pain     Hypertension Follow-up      Do you check your blood pressure regularly outside of the clinic? No not checking lately but usually good and heart is feeling ok . Past due for ;a and need refill     Are you following a low salt diet? Yes    Are your blood pressures ever more than 140 on the top number (systolic) OR more   than 90 on the bottom number (diastolic), for example 140/90? No          Review of Systems   Musculoskeletal: Positive for back pain.      Constitutional, HEENT, cardiovascular, pulmonary, GI, , musculoskeletal, neuro, skin, endocrine and psych systems are negative, except as otherwise noted.      Objective           Vitals:  No vitals were obtained today due to virtual visit.    Physical Exam   healthy, alert and no distress  EYES- with glasses but grossly normal to inspection   PSYCH: Alert and oriented times 3; coherent speech, normal   rate and volume, able to articulate logical thoughts, able   to abstract reason, no tangential thoughts, no hallucinations   or delusions  Her affect is normal  RESP: breathing looks comfortable No cough, no audible wheezing, able to talk in full sentences  MS- - as she stands - she is standing, she is pointing   toward rt groin for her source of pain , and she is pointing taht it goes around her hip   Neuro looks grossly intact   Remainder of exam unable to be completed due to virtual visits                Phone call duration: 39  minutes

## 2022-12-23 ENCOUNTER — DOCUMENTATION ONLY (OUTPATIENT)
Dept: LAB | Facility: CLINIC | Age: 57
End: 2022-12-23

## 2022-12-23 DIAGNOSIS — I10 HYPERTENSION, GOAL BELOW 140/90: ICD-10-CM

## 2022-12-23 DIAGNOSIS — E03.8 OTHER SPECIFIED HYPOTHYROIDISM: Primary | ICD-10-CM

## 2022-12-23 DIAGNOSIS — E78.5 HYPERLIPIDEMIA LDL GOAL <130: ICD-10-CM

## 2022-12-23 NOTE — PROGRESS NOTES
"Taylor [address]ens Dr has an upcoming lab appointment:    Future Appointments   Date Time Provider Department Center   12/26/2022  9:15 AM [facility]   1/13/2023 10:30 AM Lacey Waite PA-C [facility]   1/17/2023  1:50 PM Yajaira Bee, [facility]     The Appointment Note reads as: \"Yearly lab work for thyroid and cholesterol\"    There is no Thyroid order available.   Please review and place either future orders or HMPO (Review of Health Maintenance Protocol Orders), as appropriate.    Health Maintenance Due   Topic     CMP      Thank You,  Francesca Jensen MLT (ASCP)  "

## 2023-01-12 ENCOUNTER — LAB (OUTPATIENT)
Dept: LAB | Facility: CLINIC | Age: 58
End: 2023-01-12
Payer: COMMERCIAL

## 2023-01-12 DIAGNOSIS — E78.5 HYPERLIPIDEMIA LDL GOAL <130: ICD-10-CM

## 2023-01-12 DIAGNOSIS — E03.8 OTHER SPECIFIED HYPOTHYROIDISM: ICD-10-CM

## 2023-01-12 DIAGNOSIS — I10 HYPERTENSION, GOAL BELOW 140/90: ICD-10-CM

## 2023-01-12 LAB
ALBUMIN SERPL BCG-MCNC: 4.4 G/DL (ref 3.5–5.2)
ALP SERPL-CCNC: 68 U/L (ref 35–104)
ALT SERPL W P-5'-P-CCNC: 17 U/L (ref 10–35)
ANION GAP SERPL CALCULATED.3IONS-SCNC: 15 MMOL/L (ref 7–15)
AST SERPL W P-5'-P-CCNC: 23 U/L (ref 10–35)
BILIRUB SERPL-MCNC: 0.3 MG/DL
BUN SERPL-MCNC: 16 MG/DL (ref 6–20)
CALCIUM SERPL-MCNC: 9.5 MG/DL (ref 8.6–10)
CHLORIDE SERPL-SCNC: 101 MMOL/L (ref 98–107)
CHOLEST SERPL-MCNC: 190 MG/DL
CREAT SERPL-MCNC: 0.81 MG/DL (ref 0.51–0.95)
DEPRECATED HCO3 PLAS-SCNC: 24 MMOL/L (ref 22–29)
GFR SERPL CREATININE-BSD FRML MDRD: 84 ML/MIN/1.73M2
GLUCOSE SERPL-MCNC: 105 MG/DL (ref 70–99)
HDLC SERPL-MCNC: 59 MG/DL
LDLC SERPL CALC-MCNC: 118 MG/DL
NONHDLC SERPL-MCNC: 131 MG/DL
POTASSIUM SERPL-SCNC: 3.3 MMOL/L (ref 3.4–5.3)
PROT SERPL-MCNC: 6.9 G/DL (ref 6.4–8.3)
SODIUM SERPL-SCNC: 140 MMOL/L (ref 136–145)
TRIGL SERPL-MCNC: 66 MG/DL
TSH SERPL DL<=0.005 MIU/L-ACNC: 1.96 UIU/ML (ref 0.3–4.2)

## 2023-01-12 PROCEDURE — 84443 ASSAY THYROID STIM HORMONE: CPT

## 2023-01-12 PROCEDURE — 36415 COLL VENOUS BLD VENIPUNCTURE: CPT

## 2023-01-12 PROCEDURE — 80061 LIPID PANEL: CPT

## 2023-01-12 PROCEDURE — 80053 COMPREHEN METABOLIC PANEL: CPT

## 2023-01-17 ENCOUNTER — THERAPY VISIT (OUTPATIENT)
Dept: PHYSICAL THERAPY | Facility: CLINIC | Age: 58
End: 2023-01-17
Attending: PHYSICIAN ASSISTANT
Payer: COMMERCIAL

## 2023-01-17 DIAGNOSIS — M72.2 PLANTAR FASCIITIS: ICD-10-CM

## 2023-01-17 DIAGNOSIS — M25.551 HIP PAIN, RIGHT: ICD-10-CM

## 2023-01-17 DIAGNOSIS — M79.671 RIGHT FOOT PAIN: ICD-10-CM

## 2023-01-17 DIAGNOSIS — S86.811A STRAIN OF CALF MUSCLE, RIGHT, INITIAL ENCOUNTER: ICD-10-CM

## 2023-01-17 DIAGNOSIS — S76.011A STRAIN OF RIGHT PSOAS MUSCLE, INITIAL ENCOUNTER: ICD-10-CM

## 2023-01-17 PROCEDURE — 97110 THERAPEUTIC EXERCISES: CPT | Mod: GP | Performed by: PHYSICAL THERAPIST

## 2023-01-17 PROCEDURE — 97162 PT EVAL MOD COMPLEX 30 MIN: CPT | Mod: GP | Performed by: PHYSICAL THERAPIST

## 2023-01-17 NOTE — PROGRESS NOTES
Physical Therapy Initial Evaluation  Subjective:  She reports that she has had plantar fasciitis for many years.   She has been doing stretches/massage/icing for her plantar fasciitis pain reoccurred in 9/2022 with no improvement of symptoms. This time it is going up into her calf muscle and it is very tight with intermittent tingling, which is different than prior episodes.  Then she noticed some R hip pain, but that has improved.  She has been putting ice/heat on it and avoiding painful activities such as shoveling.  So in the fall she started the pain worsening with walking. Pt has a dog and has been a  walking dogs.  She also raises service dogs.  She recently retired from teaching.    The history is provided by the patient. No  was used.   Patient Health History  Taylor Baeza being seen for Pain on right leg and foot.     Problem began: 9/1/2022.   Problem occurred: Past issues with this pain   Pain is reported as 9/10 on pain scale.  General health as reported by patient is good.  Pertinent medical history includes: fibromyalgia, high blood pressure, numbness/tingling, overweight, pain at night/rest and thyroid problems.     Medical allergies: none.   Surgeries include:  Other. Other surgery history details: Breast reduction.    Current medications:  Anti-depressants, high blood pressure medication and thyroid medication.    Current occupation is Retired teacher.                     Therapist Generated HPI Evaluation         Type of problem:  Right foot.    This is a chronic condition.  Condition occurred with:  Insidious onset.  Where condition occurred: for unknown reasons.  Patient reports pain:  Longitudinal arch and lateral (R heel).  Pain is described as sharp and aching and is constant.  Pain radiates to:  Lower leg (R groin/R low back). Pain timing: worse wtih walking/standing.  Since onset symptoms are unchanged.  Associated symptoms:  Loss of motion/stiffness and  tingling (intermittent tingling into R calf). Symptoms are exacerbated by standing and walking  Relieved by: tylenol/advil.    Previous treatment includes physical therapy. There was significant improvement following previous treatment.  Barriers include:  None as reported by patient.    Therapist Generated HPI Evaluation         Type of problem:  Right hip.    This is a chronic condition.  Condition occurred with:  Insidious onset.  Where condition occurred: for unknown reasons.  Patient reports pain:  Groin.  Pain is described as aching and is intermittent.  Pain radiates to:  Low back. Pain is worse in the A.M..  Since onset symptoms are gradually improving.  Symptoms are exacerbated by lying on extremity and walking (shoveling)  Relieved by: nothing specific.                              Objective:  Standing Alignment:                Ankle/Foot:  Pes planus L and pes planus R    Gait:    Gait Type:  Normal                    Lumbar/SI Evaluation    Lumbar Myotomes:  Lumbar myotomes: pain at R low back/hip with MMT of R hip flexor/R quad.  T12-L3 (Hip Flex):  Left: 5    Right: 5  L2-4 (Quads):  Left:  5    Right:  5  L4 (Ankle DF):  Left:  5    Right:  5  L5 (Great Toe Ext): Left: 5    Right: 5                                                                    [place] Lumbar Evaluation    Posture:  Sitting: fair  Standing: good  Lordosis: Accentuated  Lateral Shift: no  Correction of Posture: no effect  Other Observations: felt really good on low back to have lumbar support - unsure if it helped with her R calf pain/foot pain  Movement Loss:  Flexion (Flex): nil  Extension (EXT): mod  Side Glide R (SG R): nil  Side Glide L (SG L): nil  Test Movements:      HOLLY: During: decreases  After: no better  Mechanical Response: no effect  Repeat HOLLY: During: decreases  After: no better  Mechanical Response: no effect  EIL: During: peripheralizing  After: worse  Mechanical Response: no effect  Repeat EIL: During:  peripheralizing  After: worse  Mechanical Response: no effect        Conclusion: other (R LE pain/tingling/tightness possibly secondary to lumbar issues)  Principle of Treatment:  Posture Correction: recommended use of lumbar support when sitting to support curvature in back        Other: trial of lumbar flexion in lying x 10-15 repetitions, 2 times/day, core strengthening                                       ROS    Assessment/Plan:    Patient is a 57 year old female with lumbar, right side hip and R calf/foot complaints.    Patient has the following significant findings with corresponding treatment plan.                Diagnosis 1:  R LBP/R hip pain/R calf and foot pain secondary to possibly lumbar origin  Pain -  directional preference exercise and home program  Decreased ROM/flexibility - manual therapy, therapeutic exercise and home program  Decreased function - therapeutic activities and home program  Impaired posture - neuro re-education and home program  Diagnosis 2:  R ankle/foot pain possibly due to plantar fasciitis   Pain -  hot/cold therapy and manual therapy  Decreased ROM/flexibility - manual therapy, therapeutic exercise and home program  Inflammation - cold therapy and US  Decreased function - therapeutic activities and home program    Therapy Evaluation Codes:   1) History comprised of:   Personal factors that impact the plan of care:      Time since onset of symptoms.    Comorbidity factors that impact the plan of care are:      High blood pressure, Overweight and fibromyalgia, thyroid issues.     Medications impacting care: Anti-depressant and High blood pressure.  2) Examination of Body Systems comprised of:   Body structures and functions that impact the plan of care:      Lumbar spine and foot.   Activity limitations that impact the plan of care are:      Standing and Walking.  3) Clinical presentation characteristics are:   Evolving/Changing.  4) Decision-Making    High complexity using  standardized patient assessment instrument and/or measureable assessment of functional outcome.  Cumulative Therapy Evaluation is: Moderate complexity.    Previous and current functional limitations:  (See Goal Flow Sheet for this information)    Short term and Long term goals: (See Goal Flow Sheet for this information)     Communication ability:  Patient appears to be able to clearly communicate and understand verbal and written communication and follow directions correctly.  Treatment Explanation - The following has been discussed with the patient:   RX ordered/plan of care  Anticipated outcomes  Possible risks and side effects  This patient would benefit from PT intervention to resume normal activities.   Rehab potential is good.    Frequency:  1 X week, once daily  Duration:  for 6 weeks  Discharge Plan:  Achieve all LTG.  Independent in home treatment program.  Reach maximal therapeutic benefit.    Please refer to the daily flowsheet for treatment today, total treatment time and time spent performing 1:1 timed codes.

## 2023-01-26 ENCOUNTER — THERAPY VISIT (OUTPATIENT)
Dept: PHYSICAL THERAPY | Facility: CLINIC | Age: 58
End: 2023-01-26
Payer: COMMERCIAL

## 2023-01-26 DIAGNOSIS — M79.671 RIGHT FOOT PAIN: Primary | ICD-10-CM

## 2023-01-26 DIAGNOSIS — M25.551 HIP PAIN, RIGHT: ICD-10-CM

## 2023-01-26 PROCEDURE — 97110 THERAPEUTIC EXERCISES: CPT | Mod: GP | Performed by: PHYSICAL THERAPIST

## 2023-02-26 ENCOUNTER — MYC REFILL (OUTPATIENT)
Dept: FAMILY MEDICINE | Facility: CLINIC | Age: 58
End: 2023-02-26
Payer: COMMERCIAL

## 2023-02-26 DIAGNOSIS — F32.0 MILD MAJOR DEPRESSION (H): ICD-10-CM

## 2023-02-26 DIAGNOSIS — F41.0 PANIC ATTACKS: ICD-10-CM

## 2023-02-26 DIAGNOSIS — G47.09 OTHER INSOMNIA: ICD-10-CM

## 2023-02-26 DIAGNOSIS — F41.9 ANXIETY: ICD-10-CM

## 2023-02-28 RX ORDER — CITALOPRAM HYDROBROMIDE 20 MG/1
20 TABLET ORAL DAILY
Qty: 90 TABLET | Refills: 0 | Status: SHIPPED | OUTPATIENT
Start: 2023-02-28 | End: 2023-04-18

## 2023-03-23 ENCOUNTER — MYC MEDICAL ADVICE (OUTPATIENT)
Dept: FAMILY MEDICINE | Facility: CLINIC | Age: 58
End: 2023-03-23
Payer: COMMERCIAL

## 2023-03-23 ENCOUNTER — MYC REFILL (OUTPATIENT)
Dept: FAMILY MEDICINE | Facility: CLINIC | Age: 58
End: 2023-03-23
Payer: COMMERCIAL

## 2023-03-23 DIAGNOSIS — F32.0 MILD MAJOR DEPRESSION (H): ICD-10-CM

## 2023-03-23 DIAGNOSIS — F41.0 PANIC ATTACKS: ICD-10-CM

## 2023-03-23 DIAGNOSIS — F41.9 ANXIETY: ICD-10-CM

## 2023-03-23 RX ORDER — BUPROPION HYDROCHLORIDE 300 MG/1
TABLET ORAL
Qty: 90 TABLET | Refills: 0 | OUTPATIENT
Start: 2023-03-23

## 2023-04-18 ENCOUNTER — OFFICE VISIT (OUTPATIENT)
Dept: FAMILY MEDICINE | Facility: CLINIC | Age: 58
End: 2023-04-18
Payer: COMMERCIAL

## 2023-04-18 VITALS
HEART RATE: 83 BPM | DIASTOLIC BLOOD PRESSURE: 80 MMHG | HEIGHT: 63 IN | OXYGEN SATURATION: 95 % | BODY MASS INDEX: 35.61 KG/M2 | WEIGHT: 201 LBS | TEMPERATURE: 99.5 F | SYSTOLIC BLOOD PRESSURE: 124 MMHG | RESPIRATION RATE: 12 BRPM

## 2023-04-18 DIAGNOSIS — F41.0 PANIC ATTACKS: ICD-10-CM

## 2023-04-18 DIAGNOSIS — Z00.00 ROUTINE GENERAL MEDICAL EXAMINATION AT A HEALTH CARE FACILITY: Primary | ICD-10-CM

## 2023-04-18 DIAGNOSIS — M72.2 PLANTAR FASCIITIS: ICD-10-CM

## 2023-04-18 DIAGNOSIS — Z12.4 CERVICAL CANCER SCREENING: ICD-10-CM

## 2023-04-18 DIAGNOSIS — F32.0 MILD MAJOR DEPRESSION (H): ICD-10-CM

## 2023-04-18 DIAGNOSIS — I10 HYPERTENSION, GOAL BELOW 140/90: ICD-10-CM

## 2023-04-18 DIAGNOSIS — F41.9 ANXIETY: ICD-10-CM

## 2023-04-18 DIAGNOSIS — G47.09 OTHER INSOMNIA: ICD-10-CM

## 2023-04-18 DIAGNOSIS — I10 ESSENTIAL HYPERTENSION: ICD-10-CM

## 2023-04-18 DIAGNOSIS — E87.6 LOW BLOOD POTASSIUM: ICD-10-CM

## 2023-04-18 DIAGNOSIS — E66.01 MORBID OBESITY (H): ICD-10-CM

## 2023-04-18 DIAGNOSIS — E03.9 HYPOTHYROIDISM, UNSPECIFIED TYPE: ICD-10-CM

## 2023-04-18 LAB
ANION GAP SERPL CALCULATED.3IONS-SCNC: 16 MMOL/L (ref 7–15)
BUN SERPL-MCNC: 16.4 MG/DL (ref 6–20)
CALCIUM SERPL-MCNC: 10 MG/DL (ref 8.6–10)
CHLORIDE SERPL-SCNC: 101 MMOL/L (ref 98–107)
CHOLEST SERPL-MCNC: 209 MG/DL
CREAT SERPL-MCNC: 0.85 MG/DL (ref 0.51–0.95)
DEPRECATED HCO3 PLAS-SCNC: 23 MMOL/L (ref 22–29)
GFR SERPL CREATININE-BSD FRML MDRD: 79 ML/MIN/1.73M2
GLUCOSE SERPL-MCNC: 100 MG/DL (ref 70–99)
HBA1C MFR BLD: 5.3 % (ref 0–5.6)
HDLC SERPL-MCNC: 58 MG/DL
LDLC SERPL CALC-MCNC: 112 MG/DL
NONHDLC SERPL-MCNC: 151 MG/DL
POTASSIUM SERPL-SCNC: 3.5 MMOL/L (ref 3.4–5.3)
SODIUM SERPL-SCNC: 140 MMOL/L (ref 136–145)
TRIGL SERPL-MCNC: 197 MG/DL

## 2023-04-18 PROCEDURE — 99396 PREV VISIT EST AGE 40-64: CPT | Performed by: FAMILY MEDICINE

## 2023-04-18 PROCEDURE — 36415 COLL VENOUS BLD VENIPUNCTURE: CPT

## 2023-04-18 PROCEDURE — G0145 SCR C/V CYTO,THINLAYER,RESCR: HCPCS | Performed by: FAMILY MEDICINE

## 2023-04-18 PROCEDURE — 83036 HEMOGLOBIN GLYCOSYLATED A1C: CPT | Performed by: FAMILY MEDICINE

## 2023-04-18 PROCEDURE — 80048 BASIC METABOLIC PNL TOTAL CA: CPT | Performed by: FAMILY MEDICINE

## 2023-04-18 PROCEDURE — 87624 HPV HI-RISK TYP POOLED RSLT: CPT | Performed by: FAMILY MEDICINE

## 2023-04-18 PROCEDURE — 80061 LIPID PANEL: CPT

## 2023-04-18 PROCEDURE — 99213 OFFICE O/P EST LOW 20 MIN: CPT | Mod: 25 | Performed by: FAMILY MEDICINE

## 2023-04-18 RX ORDER — HYDROCHLOROTHIAZIDE 25 MG/1
25 TABLET ORAL DAILY
Qty: 90 TABLET | Refills: 0 | Status: SHIPPED | OUTPATIENT
Start: 2023-04-18 | End: 2023-08-10

## 2023-04-18 RX ORDER — CITALOPRAM HYDROBROMIDE 20 MG/1
20 TABLET ORAL DAILY
Qty: 90 TABLET | Refills: 0 | Status: SHIPPED | OUTPATIENT
Start: 2023-04-18 | End: 2023-08-07

## 2023-04-18 RX ORDER — BUPROPION HYDROCHLORIDE 300 MG/1
TABLET ORAL
Qty: 90 TABLET | Refills: 1 | Status: SHIPPED | OUTPATIENT
Start: 2023-04-18 | End: 2023-11-08

## 2023-04-18 RX ORDER — BUPROPION HYDROCHLORIDE 150 MG/1
TABLET ORAL
Qty: 90 TABLET | Refills: 0 | Status: SHIPPED | OUTPATIENT
Start: 2023-04-18 | End: 2023-10-29

## 2023-04-18 RX ORDER — LEVOTHYROXINE SODIUM 112 UG/1
112 TABLET ORAL DAILY
Qty: 90 TABLET | Refills: 3 | Status: SHIPPED | OUTPATIENT
Start: 2023-04-18 | End: 2024-05-02

## 2023-04-18 ASSESSMENT — ENCOUNTER SYMPTOMS
SHORTNESS OF BREATH: 0
ARTHRALGIAS: 0
FREQUENCY: 0
PARESTHESIAS: 0
HEARTBURN: 0
EYE PAIN: 0
ABDOMINAL PAIN: 0
WEAKNESS: 0
HEMATURIA: 0
NAUSEA: 0
HEMATOCHEZIA: 0
FEVER: 0
JOINT SWELLING: 0
DYSURIA: 0
NERVOUS/ANXIOUS: 0
COUGH: 0
SORE THROAT: 0
BREAST MASS: 0
PALPITATIONS: 0
CONSTIPATION: 0
DIZZINESS: 0
MYALGIAS: 1
DIARRHEA: 0
HEADACHES: 0
CHILLS: 0

## 2023-04-18 ASSESSMENT — PATIENT HEALTH QUESTIONNAIRE - PHQ9
SUM OF ALL RESPONSES TO PHQ QUESTIONS 1-9: 4
SUM OF ALL RESPONSES TO PHQ QUESTIONS 1-9: 4
10. IF YOU CHECKED OFF ANY PROBLEMS, HOW DIFFICULT HAVE THESE PROBLEMS MADE IT FOR YOU TO DO YOUR WORK, TAKE CARE OF THINGS AT HOME, OR GET ALONG WITH OTHER PEOPLE: NOT DIFFICULT AT ALL

## 2023-04-18 ASSESSMENT — PAIN SCALES - GENERAL: PAINLEVEL: EXTREME PAIN (8)

## 2023-04-18 NOTE — PROGRESS NOTES
SUBJECTIVE:   CC: Taylor is an 57 year old who presents for preventive health visit.       4/18/2023     2:41 PM   Additional Questions   Roomed by KM     Patient has been advised of split billing requirements and indicates understanding: Yes  Healthy Habits:     Getting at least 3 servings of Calcium per day:  Yes    Bi-annual eye exam:  Yes    Dental care twice a year:  Yes    Sleep apnea or symptoms of sleep apnea:  None    Diet:  Other    Frequency of exercise:  2-3 days/week    Duration of exercise:  15-30 minutes    Taking medications regularly:  Yes    Medication side effects:  None    PHQ-2 Total Score: 0    Additional concerns today:  Yes          PROBLEMS TO ADD ON...    Hypertension Follow-up      Do you check your blood pressure regularly outside of the clinic? Not as regular but mostly good . Her potassium was low couple of months ago so wanted to do follow up check.      has been  trying to eat mor bananas fruits etc , so wants to check labs      has no other problem taking hydrochlorothiazide otherwise but would be willing to switch to something else if K remains low     Are you following a low salt diet? Yes    Are your blood pressures ever more than 140 on the top number (systolic) OR more   than 90 on the bottom number (diastolic), for example 140/90? No    Depression and Anxiety Follow-Up    How are you doing with your depression since your last visit? No change. She tried decreasing dose of Wellbutrin last year but could not do it and started feeling more crabby and irritable so went back on it. But willing to try again as she is retired now stress level is better and she is feeling well enough to try again     How are you doing with your anxiety since your last visit?  No change    Are you having other symptoms that might be associated with depression or anxiety? No    Have you had a significant life event? No     Do you have any concerns with your use of alcohol or other drugs? No    Social  History     Tobacco Use     Smoking status: Never     Smokeless tobacco: Never   Substance Use Topics     Alcohol use: Yes     Alcohol/week: 0.0 standard drinks of alcohol     Comment: 2 x week      Drug use: No         7/13/2022     1:39 PM 8/4/2022     1:12 PM 4/18/2023    10:27 AM   PHQ   PHQ-9 Total Score 0 3 4   Q9: Thoughts of better off dead/self-harm past 2 weeks Not at all Not at all Not at all         6/1/2021    12:10 PM 6/29/2021    11:49 AM 8/4/2022     9:24 AM   GAVIN-7 SCORE   Total Score  1 (minimal anxiety) 5 (mild anxiety)   Total Score 4 1 5         4/18/2023    10:27 AM   Last PHQ-9   1.  Little interest or pleasure in doing things 0   2.  Feeling down, depressed, or hopeless 0   3.  Trouble falling or staying asleep, or sleeping too much 1   4.  Feeling tired or having little energy 1   5.  Poor appetite or overeating 2   6.  Feeling bad about yourself 0   7.  Trouble concentrating 0   8.  Moving slowly or restless 0   Q9: Thoughts of better off dead/self-harm past 2 weeks 0   PHQ-9 Total Score 4         8/4/2022     9:24 AM   GAVIN-7    1. Feeling nervous, anxious, or on edge 1   2. Not being able to stop or control worrying 1   3. Worrying too much about different things 1   4. Trouble relaxing 1   5. Being so restless that it is hard to sit still 0   6. Becoming easily annoyed or irritable 0   7. Feeling afraid, as if something awful might happen 1   GAVIN-7 Total Score 5       Suicide Assessment Five-step Evaluation and Treatment (SAFE-T)    Hypothyroidism Follow-up    Had labs couple of months ago and it was good so just need refill     Since last visit, patient describes the following symptoms: Weight stable, but still struggling to  loose weight despite trying to eat better she admits that her foot problem limits her from doing things but she still tries to walk etc. no hair loss, no skin changes, no constipation, no loose stools      Today's PHQ-2 Score:       4/18/2023    10:32 AM   PHQ-2  ( 1999 [employer])   Q1: Little interest or pleasure in doing things 0   Q2: Feeling down, depressed or hopeless 0   PHQ-2 Score 0   Q1: Little interest or pleasure in doing things Not at all   Q2: Feeling down, depressed or hopeless Not at all   PHQ-2 Score 0       Have you ever done Advance Care Planning? (For example, a Health Directive, POLST, or a discussion with a medical provider or your loved ones about your wishes): Yes, patient states has an Advance Care Planning document and will bring a copy to the clinic.    Social History     Tobacco Use     Smoking status: Never     Smokeless tobacco: Never   Vaping Use     Vaping status: Not on file   Substance Use Topics     Alcohol use: Yes     Alcohol/week: 0.0 standard drinks of alcohol     Comment: 2 x week              4/18/2023    10:32 AM   Alcohol Use   Prescreen: >3 drinks/day or >7 drinks/week? No     Reviewed orders with patient.  Reviewed health maintenance and updated orders accordingly - Yes  Patient Active Problem List   Diagnosis     Hypothyroidism     Morbid obesity (H)     Rosacea     CARDIOVASCULAR SCREENING; LDL GOAL LESS THAN 130     Plantar fasciitis     Anxiety     Enthesopathy of ankle and tarsus     Snoring     GERD (gastroesophageal reflux disease)     Hypertension, goal below 140/90     Panic attacks     Vitamin D deficiency     Paresthesia     Hyperglycemia     Sleep disorder     Mild major depression (H)     Cystic acne     Other specified hypothyroidism     Hyperlipidemia LDL goal <130     Hypertension     Hypothyroid     Other insomnia     Idiopathic neuropathy     BMI 35.0-35.9,adult     Diverticulosis of colon     Irritable bowel syndrome, unspecified type     Right foot pain     Hip pain, right     Past Surgical History:   Procedure Laterality Date     BREAST SURGERY      breast reduction     COLONOSCOPY N/A 10/19/2017    Procedure: COMBINED COLONOSCOPY, SINGLE OR MULTIPLE BIOPSY/POLYPECTOMY BY BIOPSY;  colonoscopy;  Surgeon: Jozef  Marquise Mosley MD;  Location:  GI     TONSILLECTOMY  1970       Social History     Tobacco Use     Smoking status: Never     Smokeless tobacco: Never   Vaping Use     Vaping status: Not on file   Substance Use Topics     Alcohol use: Yes     Alcohol/week: 0.0 standard drinks of alcohol     Comment: 2 x week      Family History   Problem Relation Age of Onset     Diabetes Sister      Thyroid Disease Father      Hypertension Father      Hypertension Mother      Diabetes Maternal Grandmother      Diabetes Sister      Thyroid Disease Sister      Breast Cancer No family hx of      Cancer - colorectal No family hx of      C.A.D. No family hx of            Breast Cancer Screenin/18/2023    10:33 AM   Breast CA Risk Assessment (FHS-7)   Do you have a family history of breast, colon, or ovarian cancer? No / Unknown         Mammogram Screening: Recommended mammography every 1-2 years with patient discussion and risk factor consideration  Pertinent mammograms are reviewed under the imaging tab.    History of abnormal Pap smear: NO - age 30- 65 PAP every 3 years recommended      Latest Ref Rng & Units 10/31/2017     1:55 PM 10/31/2017    12:52 PM   PAP / HPV   PAP (Historical)   NIL     HPV 16 DNA NEG^Negative Negative      HPV 18 DNA NEG^Negative Negative      Other HR HPV NEG^Negative Negative        Reviewed and updated as needed this visit by clinical staff   Tobacco  Allergies  Meds              Reviewed and updated as needed this visit by Provider                 Past Medical History:   Diagnosis Date     Anxiety     off medication ( citalopram) 2012, psych      Atypical chest pain     stress test - ve      Decreased libido 2008    used injection of testosterone and cream and felt great but levels became supertherapeutic so stopped it     Depressive disorder      Dyspepsia      GERD (gastroesophageal reflux disease)      H/O infertility      Hypertension      Hypothyroid      Neuropathy      idopathic-SSRIs improve symptoms.      Obesity      Plantar fasciitis      Sleep disturbance         Review of Systems   Constitutional: Negative for chills and fever.   HENT: Negative for congestion, ear pain, hearing loss and sore throat.    Eyes: Negative for pain and visual disturbance.   Respiratory: Negative for cough and shortness of breath.    Cardiovascular: Negative for chest pain, palpitations and peripheral edema.   Gastrointestinal: Negative for abdominal pain, constipation, diarrhea, heartburn, hematochezia and nausea.   Breasts:  Negative for tenderness, breast mass and discharge.   Genitourinary: Negative for dysuria, frequency, genital sores, hematuria, pelvic pain, urgency, vaginal bleeding and vaginal discharge.   Musculoskeletal: Positive for myalgias. Negative for arthralgias and joint swelling.   Skin: Negative for rash.   Neurological: Negative for dizziness, weakness, headaches and paresthesias.   Psychiatric/Behavioral: Negative for mood changes. The patient is not nervous/anxious.      CONSTITUTIONAL: NEGATIVE for fever, chills, change in weight  INTEGUMENTARY/SKIN: NEGATIVE for worrisome rashes, moles or lesions  EYES: NEGATIVE for vision changes or irritation  ENT: NEGATIVE for ear, mouth and throat problems  RESP: NEGATIVE for significant cough or SOB  BREAST: NEGATIVE for masses, tenderness or discharge  CV: NEGATIVE for chest pain, palpitations or peripheral edema  GI: NEGATIVE for nausea, abdominal pain, heartburn, or change in bowel habits  : NEGATIVE for unusual urinary or vaginal symptoms. No vaginal bleeding.  MUSCULOSKELETAL: NEGATIVE for significant arthralgias or myalgia  MUSCULOSKELETAL:POSITIVE  for foot problem as per HPI   NEURO: NEGATIVE for weakness, dizziness or paresthesias  ENDOCRINE: NEGATIVE for temperature intolerance, skin/hair changes  PSYCHIATRIC: NEGATIVE for changes in mood or affect  PSYCHIATRIC: POSITIVE for  HX anxiety and HX depression doing well as per  "HPI    OBJECTIVE:   /80   Pulse 83   Temp 99.5  F (37.5  C) (Tympanic)   Resp 12   Ht 1.605 m (5' 3.19\")   Wt 91.2 kg (201 lb)   LMP  (LMP Unknown)   SpO2 95%   BMI 35.39 kg/m    Physical Exam  GENERAL: healthy, alert and no distress  EYES: Eyes grossly normal to inspection, PERRL and conjunctivae and sclerae normal  HENT: ear canals and TM's normal, nose and mouth without ulcers or lesions  NECK: no adenopathy, no asymmetry, masses, or scars and thyroid normal to palpation  RESP: lungs clear to auscultation - no rales, rhonchi or wheezes  BREAST: normal without masses, tenderness or nipple discharge and no palpable axillary masses or adenopathy  CV: regular rate and rhythm, normal S1 S2, no S3 or S4, no murmur, click or rub, no peripheral edema   ABDOMEN: soft, nontender, no hepatosplenomegaly, no masses and bowel sounds normal   (female): normal female external genitalia, normal urethral meatus , vaginal mucosa pink, moist, well rugated and normal cervix, adnexae, and uterus without masses.  MS: no gross musculoskeletal defects noted, no edema  MS: foot with no swelling but has tenderness across medial arch of right  foot   SKIN: no suspicious lesions or rashes  NEURO: Normal strength and tone, mentation intact and speech normal  PSYCH: mentation appears normal, affect normal/bright        ASSESSMENT/PLAN:   (Z00.00) Routine general medical examination at a health care facility  (primary encounter diagnosis)  Comment:   Plan: *MA Screening Digital Bilateral            (Z12.4) Cervical cancer screening  Comment:   Plan: Pap Screen with HPV - recommended age 30 - 65         years, HPV Hold (Lab Only), HPV High Risk Types        DNA Cervical              (E03.9) Hypothyroidism, unspecified type  Comment:   Plan: levothyroxine (SYNTHROID/LEVOTHROID) 112 MCG         tablet          Discussed recent labs stable , continue with same dose refill sent. Recheck in one year     (I10) Hypertension, goal below " 140/90  Comment:   Plan: hydrochlorothiazide (HYDRODIURIL) 25 MG tablet            (I10) Essential hypertension  Comment: stable on hctz- but has lo potassium - need to monitor , switch if problem   Plan: hydrochlorothiazide (HYDRODIURIL) 25 MG tablet          BP in adequate control. Discussed cares, low fat low salt  diet etc.  discussed recent  Labs.        Refill given. encouraged home BP monitoring. Follow up recheck in 6 months, sooner if problem.       (F32.0) Mild major depression (H)  Comment:   Plan: citalopram (CELEXA) 20 MG tablet, buPROPion         (WELLBUTRIN XL) 300 MG 24 hr tablet, buPROPion         (WELLBUTRIN XL) 150 MG 24 hr tablet            (F41.9) Anxiety  Comment:   Plan: citalopram (CELEXA) 20 MG tablet, buPROPion         (WELLBUTRIN XL) 150 MG 24 hr tablet            (F41.0) Panic attacks  Comment: improved   Plan: citalopram (CELEXA) 20 MG tablet, buPROPion         (WELLBUTRIN XL) 150 MG 24 hr tablet            (G47.09) Other insomnia  Comment:   Plan: citalopram (CELEXA) 20 MG tablet            Discussed cares, talked about signs and symptoms of anxiety/ depression and treatment options. Willing to stay on same med's for now  Pros/ cons of med's discussed . spent sometimes counseling patient. Follow up in 6  months, sooner if problem.         (E87.6) Low blood potassium  Comment: recent labs showed low K. need f/u check   Plan: Basic metabolic panel  (Ca, Cl, CO2, Creat,         Gluc, K, Na, BUN)            (E66.01) Morbid obesity (H)  Comment:   Plan: Adult Comprehensive Weight Management         Referral, Hemoglobin A1c            (Z68.35) BMI 35.0-35.9,adult  Comment:   Plan: Adult Comprehensive Weight Management         Referral            (M72.2) Plantar fasciitis  Comment: rt foot mostly   Plan: Orthopedic  Referral          Discussed feet cares. Talked about comfortable shoes, orthotics to support etc. I  May take OTC med's for  to help with pain as needed  ". bc of  ongoing problem., proceed with  podiatry referral she will follow up as needed       Check labs. refill sent. referral given. Cares and  treatment discussed . follow up if problem   Patient expressed understanding and agreement with treatment plan. All patient's questions were answered, will let me know if has more later.  Medications: Rx's: Reviewed the potential side effects/complications of medications prescribed.         COUNSELING:  Reviewed preventive health counseling, as reflected in patient instructions       Regular exercise       Healthy diet/nutrition       Vision screening       Hearing screening       Immunizations    Vaccination UTD            Alcohol Use       Osteoporosis prevention/bone health       Colorectal Cancer Screening       (Miriam)menopause management       Advance Care Planning      BMI:   Estimated body mass index is 35.39 kg/m  as calculated from the following:    Height as of this encounter: 1.605 m (5' 3.19\").    Weight as of this encounter: 91.2 kg (201 lb).   Weight management plan: Discussed healthy diet and exercise guidelines      She reports that she has never smoked. She has never used smokeless tobacco.      Ashley Chavez MD  [facility]  "

## 2023-04-21 LAB
BKR LAB AP GYN ADEQUACY: NORMAL
BKR LAB AP GYN INTERPRETATION: NORMAL
BKR LAB AP HPV REFLEX: NORMAL
BKR LAB AP PREVIOUS ABNORMAL: NORMAL
PATH REPORT.COMMENTS IMP SPEC: NORMAL
PATH REPORT.COMMENTS IMP SPEC: NORMAL
PATH REPORT.RELEVANT HX SPEC: NORMAL

## 2023-04-25 LAB
HUMAN PAPILLOMA VIRUS 16 DNA: NEGATIVE
HUMAN PAPILLOMA VIRUS 18 DNA: NEGATIVE
HUMAN PAPILLOMA VIRUS FINAL DIAGNOSIS: NORMAL
HUMAN PAPILLOMA VIRUS OTHER HR: NEGATIVE

## 2023-05-02 NOTE — PROGRESS NOTES
Taylor Baeza was seen 2 times for evaluation and treatment.  Patient did not return for further treatment and current status is unknown.  Due to short treatment duration, no objective or functional changes were made.  Please see goal flow sheet from episode noted date below and initial evaluation for further information.    Linked Episodes   Type: Episode: Status: Noted: Resolved: Last update: Updated by:   PHYSICAL THERAPY [id number] Active 1/17/2023 1/26/2023  1:50 PM Yajaira Bee PT      Comments:

## 2023-06-05 ENCOUNTER — MYC MEDICAL ADVICE (OUTPATIENT)
Dept: FAMILY MEDICINE | Facility: CLINIC | Age: 58
End: 2023-06-05
Payer: COMMERCIAL

## 2023-06-06 ENCOUNTER — MYC REFILL (OUTPATIENT)
Dept: FAMILY MEDICINE | Facility: CLINIC | Age: 58
End: 2023-06-06
Payer: COMMERCIAL

## 2023-06-06 DIAGNOSIS — L30.9 DERMATITIS: ICD-10-CM

## 2023-06-07 RX ORDER — NEOMYCIN SULFATE, POLYMYXIN B SULFATE, HYDROCORTISONE 3.5; 10000; 1 MG/ML; [USP'U]/ML; MG/ML
3 SOLUTION/ DROPS AURICULAR (OTIC) 3 TIMES DAILY PRN
Qty: 10 ML | Refills: 0 | Status: SHIPPED | OUTPATIENT
Start: 2023-06-07 | End: 2023-12-22

## 2023-07-20 ENCOUNTER — NURSE TRIAGE (OUTPATIENT)
Dept: NURSING | Facility: CLINIC | Age: 58
End: 2023-07-20
Payer: COMMERCIAL

## 2023-07-20 NOTE — TELEPHONE ENCOUNTER
Nurse Triage SBAR    Is this a 2nd Level Triage? NO    Situation: Rectal bleeding    Background: Patient states that she does have hemorrhoids and that she has had some rectal bleeding about 4 days ago and then today she had blood streaks on her stool.  She denies turning the toilet water red, denies passing only blood. Denies abdominal pain, denies dizziness, denies black or tarry looking stools.     Assessment: Hemorrhoids with bleeding    Protocol Recommended Disposition:   See in Office Within 3 Days    Recommendation: Care advice given per protocol and call back precautions discussed. Patient was warm transferred to scheduling to make an appointment. Patient verbalized understanding and agreement with the plan of care.      N/A    Does the patient meet one of the following criteria for ADS visit consideration? 16+ years old, with an [facility] PCP     TIP  Providers, please consider if this condition is appropriate for management at one of our Acute and Diagnostic Services sites.     If patient is a good candidate, please use dotphrase <dot>triageresponse and select Refer to ADS to document.    Reason for Disposition   MILD rectal bleeding (more than just a few drops or streaks)    Additional Information   Negative: Passed out (i.e., fainted, collapsed and was not responding)   Negative: Shock suspected (e.g., cold/pale/clammy skin, too weak to stand, low BP, rapid pulse)   Negative: Vomiting red blood or black (coffee ground) material   Negative: Sounds like a life-threatening emergency to the triager   Negative: Diarrhea is main symptom   Negative: Rectal symptoms   Negative: SEVERE rectal bleeding (large blood clots; constant or on and off bleeding)   Negative: SEVERE dizziness (e.g., unable to stand, requires support to walk, feels like passing out now)   Negative: MODERATE rectal bleeding (small blood clots, passing blood without stool, or toilet water turns red) more than once a day   Negative: Bloody, black,  or tarry bowel movements  (Exception: Chronic-unchanged black-grey bowel movements and is taking iron pills or Pepto-Bismol.)   Negative: High-risk adult (e.g., prior surgery on aorta, abdominal aortic aneurysm)   Negative: Rectal foreign body (inserted or swallowed)   Negative: SEVERE abdominal pain (e.g., excruciating)   Negative: Constant abdominal pain lasting > 2 hours   Negative: Pale skin (pallor) of new-onset or worsening   Negative: Patient sounds very sick or weak to the triager   Negative: MODERATE rectal bleeding (small blood clots, passing blood without stool, or toilet water turns red)   Negative: Taking Coumadin (warfarin) or other strong blood thinner, or known bleeding disorder (e.g., thrombocytopenia)   Negative: Colonoscopy in past 72 hours   Negative: Known cirrhosis of the liver (or history of liver failure or ascites)   Negative: Patient wants to be seen    Protocols used: Rectal Bleeding-A-OH

## 2023-08-07 DIAGNOSIS — G47.09 OTHER INSOMNIA: ICD-10-CM

## 2023-08-07 DIAGNOSIS — F41.9 ANXIETY: ICD-10-CM

## 2023-08-07 DIAGNOSIS — F32.0 MILD MAJOR DEPRESSION (H): ICD-10-CM

## 2023-08-07 DIAGNOSIS — F41.0 PANIC ATTACKS: ICD-10-CM

## 2023-08-07 RX ORDER — CITALOPRAM HYDROBROMIDE 20 MG/1
20 TABLET ORAL DAILY
Qty: 90 TABLET | Refills: 0 | Status: SHIPPED | OUTPATIENT
Start: 2023-08-07 | End: 2023-11-21

## 2023-08-10 ENCOUNTER — MYC REFILL (OUTPATIENT)
Dept: FAMILY MEDICINE | Facility: CLINIC | Age: 58
End: 2023-08-10
Payer: COMMERCIAL

## 2023-08-10 DIAGNOSIS — I10 ESSENTIAL HYPERTENSION: ICD-10-CM

## 2023-08-10 DIAGNOSIS — I10 HYPERTENSION, GOAL BELOW 140/90: ICD-10-CM

## 2023-08-10 RX ORDER — HYDROCHLOROTHIAZIDE 25 MG/1
25 TABLET ORAL DAILY
Qty: 90 TABLET | Refills: 0 | Status: SHIPPED | OUTPATIENT
Start: 2023-08-10 | End: 2023-11-21

## 2023-09-01 ENCOUNTER — E-VISIT (OUTPATIENT)
Dept: FAMILY MEDICINE | Facility: CLINIC | Age: 58
End: 2023-09-01
Payer: COMMERCIAL

## 2023-09-01 DIAGNOSIS — R30.0 DYSURIA: Primary | ICD-10-CM

## 2023-09-01 PROCEDURE — 99421 OL DIG E/M SVC 5-10 MIN: CPT | Performed by: PHYSICIAN ASSISTANT

## 2023-09-01 NOTE — TELEPHONE ENCOUNTER
Provider E-Visit time total (minutes): 5 mins    Team - please call patient and get her on the lab schedule this afternoon for a U/A. Ok to double book per me.

## 2023-09-01 NOTE — PATIENT INSTRUCTIONS
Dear Taylor Baeza,     After reviewing your responses, I would like you to come in for a urine test to make sure we treat you correctly. This urine test is to evaluate you for a possible urinary tract infection, and should be scheduled for today or tomorrow. Schedule a Lab Only appointment here.     Lab appointments are not available at most locations on the weekends. If no Lab Only appointment is available, you should be seen in any of our convenient Walk-in or Urgent Care Centers, which can be found on our website here.     You will receive instructions with your results in [facility] once they are available.     If your symptoms worsen, you develop pain in your back or stomach, develop fevers, or are not improving in 5 days, please contact your primary care provider for an appointment or visit a Walk-in or Urgent Care Center to be seen.     Thanks again for choosing us as your health care partner,     Lacey Waite PA-C

## 2023-09-11 ENCOUNTER — MYC MEDICAL ADVICE (OUTPATIENT)
Dept: FAMILY MEDICINE | Facility: CLINIC | Age: 58
End: 2023-09-11
Payer: COMMERCIAL

## 2023-09-11 ENCOUNTER — TELEPHONE (OUTPATIENT)
Dept: FAMILY MEDICINE | Facility: CLINIC | Age: 58
End: 2023-09-11
Payer: COMMERCIAL

## 2023-09-13 ENCOUNTER — LAB (OUTPATIENT)
Dept: LAB | Facility: CLINIC | Age: 58
End: 2023-09-13
Payer: COMMERCIAL

## 2023-09-13 DIAGNOSIS — R30.0 DYSURIA: ICD-10-CM

## 2023-09-13 LAB
ALBUMIN UR-MCNC: NEGATIVE MG/DL
APPEARANCE UR: CLEAR
BILIRUB UR QL STRIP: NEGATIVE
COLOR UR AUTO: YELLOW
GLUCOSE UR STRIP-MCNC: NEGATIVE MG/DL
HGB UR QL STRIP: ABNORMAL
KETONES UR STRIP-MCNC: NEGATIVE MG/DL
LEUKOCYTE ESTERASE UR QL STRIP: NEGATIVE
NITRATE UR QL: NEGATIVE
PH UR STRIP: 5.5 [PH] (ref 5–7)
RBC #/AREA URNS AUTO: NORMAL /HPF
SP GR UR STRIP: 1.01 (ref 1–1.03)
UROBILINOGEN UR STRIP-ACNC: 0.2 E.U./DL
WBC #/AREA URNS AUTO: NORMAL /HPF

## 2023-09-13 PROCEDURE — 81001 URINALYSIS AUTO W/SCOPE: CPT

## 2023-09-14 NOTE — TELEPHONE ENCOUNTER
Patient read the provider comments on 9/13/2023 regarding the my chart message. Will close the encounter.       Sandy Winter RN  [facility]

## 2023-09-21 ENCOUNTER — E-VISIT (OUTPATIENT)
Dept: FAMILY MEDICINE | Facility: CLINIC | Age: 58
End: 2023-09-21
Payer: COMMERCIAL

## 2023-09-21 DIAGNOSIS — N30.90 BLADDER INFECTION: ICD-10-CM

## 2023-09-21 DIAGNOSIS — R30.0 DYSURIA: Primary | ICD-10-CM

## 2023-09-21 PROCEDURE — 99421 OL DIG E/M SVC 5-10 MIN: CPT | Performed by: FAMILY MEDICINE

## 2023-09-21 NOTE — PROGRESS NOTES
Advised patient she should come in the morning and get the urine sample done in the clinic.  Okay to come and let the  know I ordered the urine test for her.

## 2023-09-22 ENCOUNTER — LAB (OUTPATIENT)
Dept: LAB | Facility: CLINIC | Age: 58
End: 2023-09-22
Payer: COMMERCIAL

## 2023-09-22 DIAGNOSIS — R30.0 DYSURIA: ICD-10-CM

## 2023-09-22 DIAGNOSIS — N89.8 VAGINAL IRRITATION: Primary | ICD-10-CM

## 2023-09-22 LAB
ALBUMIN UR-MCNC: NEGATIVE MG/DL
APPEARANCE UR: CLEAR
BACTERIA #/AREA URNS HPF: ABNORMAL /HPF
BILIRUB UR QL STRIP: NEGATIVE
COLOR UR AUTO: YELLOW
GLUCOSE UR STRIP-MCNC: NEGATIVE MG/DL
HGB UR QL STRIP: NEGATIVE
KETONES UR STRIP-MCNC: NEGATIVE MG/DL
LEUKOCYTE ESTERASE UR QL STRIP: ABNORMAL
NITRATE UR QL: NEGATIVE
PH UR STRIP: 7 [PH] (ref 5–7)
RBC #/AREA URNS AUTO: ABNORMAL /HPF
SP GR UR STRIP: 1.01 (ref 1–1.03)
SQUAMOUS #/AREA URNS AUTO: ABNORMAL /LPF
UROBILINOGEN UR STRIP-ACNC: 0.2 E.U./DL
WBC #/AREA URNS AUTO: ABNORMAL /HPF

## 2023-09-22 PROCEDURE — 81001 URINALYSIS AUTO W/SCOPE: CPT

## 2023-09-22 RX ORDER — FLUCONAZOLE 150 MG/1
150 TABLET ORAL ONCE
Qty: 1 TABLET | Refills: 0 | Status: SHIPPED | OUTPATIENT
Start: 2023-09-22 | End: 2023-09-22

## 2023-09-26 ENCOUNTER — HOSPITAL ENCOUNTER (OUTPATIENT)
Dept: MAMMOGRAPHY | Facility: CLINIC | Age: 58
Discharge: HOME | End: 2023-09-26
Attending: FAMILY MEDICINE | Admitting: FAMILY MEDICINE
Payer: COMMERCIAL

## 2023-09-26 DIAGNOSIS — Z12.31 VISIT FOR SCREENING MAMMOGRAM: ICD-10-CM

## 2023-09-26 PROCEDURE — 77067 SCR MAMMO BI INCL CAD: CPT

## 2023-10-26 ENCOUNTER — E-VISIT (OUTPATIENT)
Dept: FAMILY MEDICINE | Facility: CLINIC | Age: 58
End: 2023-10-26
Payer: COMMERCIAL

## 2023-10-26 DIAGNOSIS — F41.9 ANXIETY: ICD-10-CM

## 2023-10-26 DIAGNOSIS — F32.0 MILD MAJOR DEPRESSION (H): ICD-10-CM

## 2023-10-26 DIAGNOSIS — F41.0 PANIC ATTACKS: ICD-10-CM

## 2023-10-26 PROCEDURE — 99422 OL DIG E/M SVC 11-20 MIN: CPT | Performed by: FAMILY MEDICINE

## 2023-10-26 ASSESSMENT — ANXIETY QUESTIONNAIRES
6. BECOMING EASILY ANNOYED OR IRRITABLE: NOT AT ALL
3. WORRYING TOO MUCH ABOUT DIFFERENT THINGS: NEARLY EVERY DAY
5. BEING SO RESTLESS THAT IT IS HARD TO SIT STILL: SEVERAL DAYS
7. FEELING AFRAID AS IF SOMETHING AWFUL MIGHT HAPPEN: NEARLY EVERY DAY
GAD7 TOTAL SCORE: 16
4. TROUBLE RELAXING: NEARLY EVERY DAY
GAD7 TOTAL SCORE: 16
1. FEELING NERVOUS, ANXIOUS, OR ON EDGE: NEARLY EVERY DAY
2. NOT BEING ABLE TO STOP OR CONTROL WORRYING: NEARLY EVERY DAY

## 2023-10-27 ASSESSMENT — ANXIETY QUESTIONNAIRES: GAD7 TOTAL SCORE: 16

## 2023-10-28 DIAGNOSIS — F32.0 MILD MAJOR DEPRESSION (H): ICD-10-CM

## 2023-10-29 RX ORDER — ALPRAZOLAM 0.25 MG
0.25 TABLET ORAL 2 TIMES DAILY PRN
Qty: 14 TABLET | Refills: 0 | Status: SHIPPED | OUTPATIENT
Start: 2023-10-29

## 2023-10-29 RX ORDER — BUPROPION HYDROCHLORIDE 150 MG/1
TABLET ORAL
Qty: 90 TABLET | Refills: 0 | Status: SHIPPED | OUTPATIENT
Start: 2023-10-29 | End: 2023-11-21

## 2023-10-29 NOTE — TELEPHONE ENCOUNTER
"Provider E-Visit time total (minutes): 15 min     HI , Nena      I have reviewed your notes, chart and medications and PH-9 score        Sorry to hear that you are not feeling well   I have reviewed your chart and health history   Here's some advise for you       I have sent one refill on xanax to take as needed for acute anxiety, also you can resume your wellbutrin 150 mg dose along with your 300 mg , as you wants to start that again .   you should be good until you can see you can schedule a follow up visit with me,  but you should be seen sooner if an worsening of symptoms or problem.     Also good idea to talk a  friend or family, or  to your therapist, if you have one that you  may have - worked with previously, but I also have placed referral again , just in case.        I would also recommend doing a follow up check in the clinic ,  so we can further evaluate and treat accordingly.    Last time I saw you in the office was April of 2023, so I do not feel comfortable continuing treatment with e- visits    So please schedule a follow up appointment soon     Virtual/ Video visit is ok, If no\" in office appointment \"available soon enough    Please read additional information sent via my chart.   Hope you feel better   Thank you for allowing me to be involved in your health care and for choosing [facility].  If you have any questions or concerns please feel free to contact me, [phone] .  Ashley Chavez MD      "

## 2023-10-29 NOTE — PATIENT INSTRUCTIONS
Thank you for choosing us for your care. I have placed an order for a prescription so that you can start treatment. View your full visit summary for details by clicking on the link below. Your pharmacist will able to address any questions you may have about the medication.      If you're not feeling better within 4-6 weeks please schedule an appointment.  You can schedule an appointment right here in [employer], or call [phone]  If the visit is for the same symptoms as your eVisit, we'll refund the cost of your eVisit if seen within seven days.    Thank you for choosing us for your care. I think an in-clinic visit would be best next steps based on your symptoms. Please schedule a clinic appointment; you won t be charged for this eVisit.      You can schedule an appointment right here in [employer], or call [phone]    Thank you for choosing us for your care. Based on your symptoms and length of illness, I do not think that you need a prescription at this time but would like you to start working with a therapy.  Please follow the care advise I've provided and the  will call you with a therapy appointment.  View your full visit summary for details by clicking on the link below.     If you're not feeling better within 4-6 weeks, please let me know and we can consider if a prescription is needed.  If you are feeling worse, please let me know before then.  You can schedule an appointment right here in [employer], or call [phone]  If the visit is for the same symptoms as your eVisit, we'll refund the cost of your eVisit if seen within seven days.    Take medications as directed.  Schedule to see   ASAP   Also, here is a crisis line if you have any need:     Call or text 646 or chat [email or web address] (copy and paste into your browser).     About [address] [employer] offers 24/7 access to trained crisis counselors who can help people experiencing mental health-related distress. That could be:  Thoughts of  suicide  Mental health or substance use crisis, or  Any other kind of emotion distress    Follow up if problem or concern

## 2023-11-08 RX ORDER — BUPROPION HYDROCHLORIDE 300 MG/1
TABLET ORAL
Qty: 30 TABLET | Refills: 0 | Status: SHIPPED | OUTPATIENT
Start: 2023-11-08 | End: 2023-11-21

## 2023-11-08 NOTE — TELEPHONE ENCOUNTER
Script refill faxed. Remind pt to do follow up for med check, since she is due.  Virtual/ Video visit ok  If no appointment available soon enough

## 2023-11-21 ENCOUNTER — VIRTUAL VISIT (OUTPATIENT)
Dept: FAMILY MEDICINE | Facility: CLINIC | Age: 58
End: 2023-11-21
Payer: COMMERCIAL

## 2023-11-21 DIAGNOSIS — I10 HYPERTENSION, GOAL BELOW 140/90: ICD-10-CM

## 2023-11-21 DIAGNOSIS — I10 ESSENTIAL HYPERTENSION: ICD-10-CM

## 2023-11-21 DIAGNOSIS — G47.09 OTHER INSOMNIA: ICD-10-CM

## 2023-11-21 DIAGNOSIS — F41.9 ANXIETY: ICD-10-CM

## 2023-11-21 DIAGNOSIS — E03.8 OTHER SPECIFIED HYPOTHYROIDISM: ICD-10-CM

## 2023-11-21 DIAGNOSIS — F32.0 MILD MAJOR DEPRESSION (H): Primary | ICD-10-CM

## 2023-11-21 DIAGNOSIS — F41.0 PANIC ATTACKS: ICD-10-CM

## 2023-11-21 PROCEDURE — 99214 OFFICE O/P EST MOD 30 MIN: CPT | Mod: VID | Performed by: FAMILY MEDICINE

## 2023-11-21 RX ORDER — BUPROPION HYDROCHLORIDE 150 MG/1
TABLET ORAL
Qty: 90 TABLET | Refills: 1 | Status: SHIPPED | OUTPATIENT
Start: 2023-11-21 | End: 2024-06-09

## 2023-11-21 RX ORDER — CITALOPRAM HYDROBROMIDE 20 MG/1
20 TABLET ORAL DAILY
Qty: 90 TABLET | Refills: 1 | Status: SHIPPED | OUTPATIENT
Start: 2023-11-21 | End: 2024-05-16

## 2023-11-21 RX ORDER — BUPROPION HYDROCHLORIDE 300 MG/1
TABLET ORAL
Qty: 30 TABLET | Refills: 0 | Status: SHIPPED | OUTPATIENT
Start: 2023-11-21 | End: 2023-12-05

## 2023-11-21 RX ORDER — HYDROCHLOROTHIAZIDE 25 MG/1
25 TABLET ORAL DAILY
Qty: 90 TABLET | Refills: 1 | Status: SHIPPED | OUTPATIENT
Start: 2023-11-21 | End: 2024-05-20

## 2023-11-21 ASSESSMENT — PATIENT HEALTH QUESTIONNAIRE - PHQ9
SUM OF ALL RESPONSES TO PHQ QUESTIONS 1-9: 2
10. IF YOU CHECKED OFF ANY PROBLEMS, HOW DIFFICULT HAVE THESE PROBLEMS MADE IT FOR YOU TO DO YOUR WORK, TAKE CARE OF THINGS AT HOME, OR GET ALONG WITH OTHER PEOPLE: SOMEWHAT DIFFICULT
SUM OF ALL RESPONSES TO PHQ QUESTIONS 1-9: 2

## 2023-11-21 ASSESSMENT — ANXIETY QUESTIONNAIRES
GAD7 TOTAL SCORE: 5
5. BEING SO RESTLESS THAT IT IS HARD TO SIT STILL: NOT AT ALL
2. NOT BEING ABLE TO STOP OR CONTROL WORRYING: SEVERAL DAYS
6. BECOMING EASILY ANNOYED OR IRRITABLE: SEVERAL DAYS
4. TROUBLE RELAXING: NOT AT ALL
GAD7 TOTAL SCORE: 5
3. WORRYING TOO MUCH ABOUT DIFFERENT THINGS: SEVERAL DAYS
7. FEELING AFRAID AS IF SOMETHING AWFUL MIGHT HAPPEN: SEVERAL DAYS
IF YOU CHECKED OFF ANY PROBLEMS ON THIS QUESTIONNAIRE, HOW DIFFICULT HAVE THESE PROBLEMS MADE IT FOR YOU TO DO YOUR WORK, TAKE CARE OF THINGS AT HOME, OR GET ALONG WITH OTHER PEOPLE: SOMEWHAT DIFFICULT
1. FEELING NERVOUS, ANXIOUS, OR ON EDGE: SEVERAL DAYS

## 2023-11-21 NOTE — PROGRESS NOTES
Taylor is a 58 year old who is being evaluated via a billable video visit.      How would you like to obtain your AVS? MyChart  If the video visit is dropped, the invitation should be resent by: Text to cell phone: [phone]  Will anyone else be joining your video visit? 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)  Comment:   Plan: buPROPion (WELLBUTRIN XL) 150 MG 24 hr tablet,         buPROPion (WELLBUTRIN XL) 300 MG 24 hr tablet,         citalopram (CELEXA) 20 MG tablet            (F41.9) Anxiety  Comment:   Plan: buPROPion (WELLBUTRIN XL) 150 MG 24 hr tablet,         citalopram (CELEXA) 20 MG tablet            (F41.0) Panic attacks  Comment: improved,not needing xanax any more  Plan: buPROPion (WELLBUTRIN XL) 150 MG 24 hr tablet,         citalopram (CELEXA) 20 MG tablet            (G47.09) Other insomnia  Comment:   Plan: citalopram (CELEXA) 20 MG tablet         Improved and doing better after  adding 150 mg of wellbutrin back and has no problem taking med's     Discussed cares, talked about signs and symptoms of anxiety/ depression and treatment options. Willing to stay on same dose for now   Pros/ cons of med's discussed . encouraged to see  to help and referral given . spent sometimes counseling patient. Follow up in 4-6  months, sooner if problem.             (I10) Essential hypertension  Comment: stable on hctz-   Plan: hydrochlorothiazide (HYDRODIURIL) 25 MG tablet            (I10) Hypertension, goal below 140/90  Comment: stable   Plan: hydrochlorothiazide (HYDRODIURIL) 25 MG tablet     BP in adequate control per previous readings . Discussed cares, low fat low slat  diet etc. Refill given. encouraged home BP monitoring.   Follow up recheck in 6 months, but sooner if problem.         (E03.8) Other specified hypothyroidism  Comment: will return for labs next month   Plan: TSH with free T4 reflex     Check labs. refill sent.Cares and  treatment discussed. follow. up if  "problem   Patient expressed understanding and agreement with treatment plan. All patient's questions were answered, will let me know if has more later.  Medications: Rx's: Reviewed the potential side effects/complications of medications prescribed.     BMI:   Estimated body mass index is 35.39 kg/m  as calculated from the following:    Height as of 4/18/23: 1.605 m (5' 3.19\").    Weight as of 4/18/23: 91.2 kg (201 lb).   Weight management plan: Discussed healthy diet and exercise guidelines        Ashley Chavez MD  [facility] GAYLA Vazquez is a 58 year old, presenting for the following health issues:  Recheck Medication        11/21/2023    11:48 AM   Additional Questions   Roomed by ellen       History of Present Illness       Mental Health Follow-up:  Patient presents to follow-up on Anxiety.    Patient's anxiety since last visit has been:  Better  The patient is not having other symptoms associated with anxiety.  Any significant life events: other  Patient is not feeling anxious or having panic attacks.  Patient has no concerns about alcohol or drug use.She consumes 0 sweetened beverage(s) daily.She exercises with enough effort to increase her heart rate 20 to 29 minutes per day.  She exercises with enough effort to increase her heart rate 7 days per week.   She is taking medications regularly.       Medication Followup of wellbutrin   Taking Medication as prescribed: yes  Side Effects:  None  Medication Helping Symptoms:  yes  Depression and Anxiety Follow-Up  How are you doing with your depression since your last visit? Improved aftre adding wellbutrin 150 mg back on aong with 300 mg   How are you doing with your anxiety since your last visit?  Improved she is doing much better and comfortable continuing med's for now   Are you having other symptoms that might be associated with depression or anxiety? No  Have you had a significant life event? No   Do you have any " concerns with your use of alcohol or other drugs? No              HT N FOLLOW UP       Also wants refill on BP med's    . doing well on current medication. She is not checking often but when she checkes it is usually good   . Denies any cardiovascular sx  of chest , pain, palpitation, SOB, leg edema etc.    Tries to eat well and exercise regularly. Feels well otherwise . need refill on medications.       Social History     Tobacco Use    Smoking status: Never    Smokeless tobacco: Never   Substance Use Topics    Alcohol use: Yes     Alcohol/week: 0.0 standard drinks of alcohol     Comment: 2 x week     Drug use: No         8/4/2022     1:12 PM 4/18/2023    10:27 AM 11/21/2023     7:17 AM   PHQ   PHQ-9 Total Score 3 4 2   Q9: Thoughts of better off dead/self-harm past 2 weeks Not at all Not at all Not at all         8/4/2022     9:24 AM 10/26/2023     5:35 PM 11/21/2023     7:20 AM   GAVIN-7 SCORE   Total Score 5 (mild anxiety) 16 (severe anxiety) 5 (mild anxiety)   Total Score 5 16 5         11/21/2023     7:17 AM   Last PHQ-9   1.  Little interest or pleasure in doing things 0   2.  Feeling down, depressed, or hopeless 0   3.  Trouble falling or staying asleep, or sleeping too much 1   4.  Feeling tired or having little energy 1   5.  Poor appetite or overeating 0   6.  Feeling bad about yourself 0   7.  Trouble concentrating 0   8.  Moving slowly or restless 0   Q9: Thoughts of better off dead/self-harm past 2 weeks 0   PHQ-9 Total Score 2         11/21/2023     7:20 AM   GAVIN-7    1. Feeling nervous, anxious, or on edge 1   2. Not being able to stop or control worrying 1   3. Worrying too much about different things 1   4. Trouble relaxing 0   5. Being so restless that it is hard to sit still 0   6. Becoming easily annoyed or irritable 1   7. Feeling afraid, as if something awful might happen 1   GAVIN-7 Total Score 5   If you checked any problems, how difficult have they made it for you to do your work, take care of  things at home, or get along with other people? Somewhat difficult       Suicide Assessment Five-step Evaluation and Treatment (SAFE-T)    Hypothyroidism Follow-up    Since last visit, patient describes the following symptoms: Weight stable, no hair loss, no skin changes, no constipation, no loose stools        Review of Systems   Constitutional, HEENT, cardiovascular, pulmonary, GI, , musculoskeletal, neuro, skin, endocrine and psych systems are negative, except as otherwise noted.      Objective           Vitals:  No vitals were obtained today due to virtual visit.    Physical Exam   GENERAL: Healthy, alert and no distress  EYES: Eyes grossly normal to inspection.  No discharge or erythema, or obvious scleral/conjunctival abnormalities.  RESP: No audible wheeze, cough, or visible cyanosis.  No visible retractions or increased work of breathing.    SKIN: Visible skin clear. No significant rash, abnormal pigmentation or lesions.  NEURO: Cranial nerves grossly intact.  Mentation and speech appropriate for age.  PSYCH: Mentation appears normal, affect normal/bright, judgement and insight intact, normal speech and appearance well-groomed.            Video-Visit Details    Type of service:  Video Visit     Originating Location (pt. Location): Home    Distant Location (provider location):  On-site  Platform used for Video Visit: Salemarked

## 2023-12-05 DIAGNOSIS — F32.0 MILD MAJOR DEPRESSION (H): ICD-10-CM

## 2023-12-05 RX ORDER — BUPROPION HYDROCHLORIDE 300 MG/1
TABLET ORAL
Qty: 30 TABLET | Refills: 4 | Status: SHIPPED | OUTPATIENT
Start: 2023-12-05 | End: 2024-06-19

## 2023-12-05 NOTE — TELEPHONE ENCOUNTER
Prescription approved per [facility] Refill Protocol.  Rosanna Paredes, RN  [facility] Triage Nurse

## 2024-01-23 ENCOUNTER — NURSE TRIAGE (OUTPATIENT)
Dept: FAMILY MEDICINE | Facility: CLINIC | Age: 59
End: 2024-01-23
Payer: COMMERCIAL

## 2024-01-23 DIAGNOSIS — U07.1 INFECTION DUE TO 2019 NOVEL CORONAVIRUS: Primary | ICD-10-CM

## 2024-01-23 NOTE — TELEPHONE ENCOUNTER
S-(situation): tested positive for covid today 1/24/22    B-(background): symptoms started yesterday 1/23/24    A-(assessment): pt states sore throat and productive cough with yellow mucus. Fever spiked to 101 F yesterday and has since resolved. Denies fever/SOB/stiff neck/chronic issues.    R-(recommendations): routed to [facility] for further evaluation of paxlovid treatment as it is only second day of symptoms.    GARRY GOODWIN RN on 1/23/2024 at 4:05 PM    Reason for Disposition   Continuous (nonstop) coughing interferes with work or school and no improvement using cough treatment per Care Advice    Additional Information   Negative: SEVERE difficulty breathing (e.g., struggling for each breath, speaks in single words)   Negative: Difficult to awaken or acting confused (e.g., disoriented, slurred speech)   Negative: Bluish (or gray) lips or face now   Negative: Shock suspected (e.g., cold/pale/clammy skin, too weak to stand, low BP, rapid pulse)   Negative: Sounds like a life-threatening emergency to the triager   Negative: Diagnosed or suspected COVID-19 and symptoms lasting 3 or more weeks   Negative: COVID-19 exposure and no symptoms   Negative: COVID-19 vaccine reaction suspected (e.g., fever, headache, muscle aches) occurring 1 to 3 days after getting vaccine   Negative: COVID-19 vaccine, questions about   Negative: Lives with someone known to have influenza (flu test positive) and flu-like symptoms (e.g., cough, runny nose, sore throat, SOB; with or without fever)   Negative: Possible COVID-19 symptoms and triager concerned about severity of symptoms or other causes   Negative: COVID-19 and breastfeeding, questions about   Negative: SEVERE or constant chest pain or pressure  (Exception: Mild central chest pain, present only when coughing.)   Negative: MODERATE difficulty breathing (e.g., speaks in phrases, SOB even at rest, pulse 100-120)   Negative: Headache and stiff neck (can't touch chin to chest)    "Negative: Oxygen level (e.g., pulse oximetry) 90% or lower   Negative: Chest pain or pressure  (Exception: MILD central chest pain, present only when coughing.)   Negative: Drinking very little and dehydration suspected (e.g., no urine > 12 hours, very dry mouth, very lightheaded)   Negative: Patient sounds very sick or weak to the triager   Negative: MILD difficulty breathing (e.g., minimal/no SOB at rest, SOB with walking, pulse <100)   Negative: Fever > 103 F (39.4 C)   Negative: Fever > 101 F (38.3 C) and over 60 years of age   Negative: Fever > 100.0 F (37.8 C) and bedridden (e.g., CVA, chronic illness, recovering from surgery)   Negative: HIGH RISK patient (e.g., weak immune system, age > 64 years, obesity with BMI of 30 or higher, pregnant, chronic lung disease or other chronic medical condition) and COVID symptoms (e.g., cough, fever)  (Exceptions: Already seen by doctor or NP/PA and no new or worsening symptoms.)   Negative: HIGH RISK patient and influenza is widespread in the community and ONE OR MORE respiratory symptoms: cough, sore throat, runny or stuffy nose   Negative: HIGH RISK patient and influenza exposure within the last 7 days and ONE OR MORE respiratory symptoms: cough, sore throat, runny or stuffy nose   Negative: Oxygen level (e.g., pulse oximetry) 91 to 94%   Negative: Fever returns after gone for over 24 hours and symptoms worse or not improved   Negative: COVID-19 infection suspected by caller or triager and mild symptoms (cough, fever, or others) and negative COVID-19 rapid test   Negative: Fever present > 3 days (72 hours)    Answer Assessment - Initial Assessment Questions  1. COVID-19 DIAGNOSIS: \"How do you know that you have COVID?\" (e.g., positive lab test or self-test, diagnosed by doctor or NP/PA, symptoms after exposure).      At home test   2. COVID-19 EXPOSURE: \"Was there any known exposure to COVID before the symptoms began?\" CDC Definition of close contact: within 6 feet (2 " "meters) for a total of 15 minutes or more over a 24-hour period.       Yes,  also has covid tested positive last week  3. ONSET: \"When did the COVID-19 symptoms start?\"       Monday 1/22/24  4. WORST SYMPTOM: \"What is your worst symptom?\" (e.g., cough, fever, shortness of breath, muscle aches)      Cough and throat pain  5. COUGH: \"Do you have a cough?\" If Yes, ask: \"How bad is the cough?\"        Moderate, yellow mucus   6. FEVER: \"Do you have a fever?\" If Yes, ask: \"What is your temperature, how was it measured, and when did it start?\"      Yes 101.0F yesterday  7. RESPIRATORY STATUS: \"Describe your breathing?\" (e.g., normal; shortness of breath, wheezing, unable to speak)       normal  8. BETTER-SAME-WORSE: \"Are you getting better, staying the same or getting worse compared to yesterday?\"  If getting worse, ask, \"In what way?\"      same  9. OTHER SYMPTOMS: \"Do you have any other symptoms?\"  (e.g., chills, fatigue, headache, loss of smell or taste, muscle pain, sore throat)      Fatigue, HA, chills, hot flashes  10. HIGH RISK DISEASE: \"Do you have any chronic medical problems?\" (e.g., asthma, heart or lung disease, weak immune system, obesity, etc.)        none  11. VACCINE: \"Have you had the COVID-19 vaccine?\" If Yes, ask: \"Which one, how many shots, when did you get it?\"        Yes, all vaccines  12. PREGNANCY: \"Is there any chance you are pregnant?\" \"When was your last menstrual period?\"        none  13. O2 SATURATION MONITOR:  \"Do you use an oxygen saturation monitor (pulse oximeter) at home?\" If Yes, ask \"What is your reading (oxygen level) today?\" \"What is your usual oxygen saturation reading?\" (e.g., 95%)        Unable to take at home    Protocols used: Coronavirus (COVID-19) Diagnosed or [id number]    "

## 2024-01-23 NOTE — TELEPHONE ENCOUNTER
RN COVID TREATMENT VISIT  01/23/24    The patient has been triaged and does not require a higher level of care.    Taylor Baeza  58 year old  Current weight? 201 lb    Has the patient been seen by a primary care provider at an [facility] or [facility] within the past two years? Yes.   Have you been in close proximity to/do you have a known exposure to a person with a confirmed case of influenza? No.     General treatment eligibility:  Date of positive COVID test (PCR or at home)?  1/23/24    Are you or have you been hospitalized for this COVID-19 infection? No.   Have you received monoclonal antibodies or antiviral treatment for COVID-19 since this positive test? No.   Do you have any of the following conditions that place you at risk of being very sick from COVID-19?   - Age 50 years or older  Yes, patient has at least one high risk condition as noted above.     Current COVID symptoms:   - fever or chills  - cough  - fatigue  - headache  - sore throat  Yes. Patient has at least one symptom as selected.     How many days since symptoms started? 5 days or less. Established patient, 12 years or older weighing at least 88.2 lbs, who has symptoms that started in the past 5 days, has not been hospitalized nor received treatment already, and is at risk for being very sick from COVID-19.     Treatment eligibility by RN:  Are you currently pregnant or nursing? No  Do you have a clinically significant hypersensitivity to nirmatrelvir or ritonavir, or toxic epidermal necrolysis (TEN) or Bronson-Yuri Syndrome? No  Do you have a history of hepatitis, any hepatic impairment on the Problem List (such as Child-Steinberg Class C, cirrhosis, fatty liver disease, alcoholic liver disease), or was the last liver lab (hepatic panel, ALT, AST, ALK Phos, bilirubin) elevated in the past 6 months? No  Do you have any history of severe renal impairment (eGFR < 30mL/min)? No    Is patient eligible to continue?  Yes, patient meets all eligibility requirements for the RN COVID treatment (as denoted by all no responses above).     Current Outpatient Medications   Medication Sig Dispense Refill    ALPRAZolam (XANAX) 0.25 MG tablet Take 1 tablet (0.25 mg) by mouth 2 times daily as needed for anxiety 14 tablet 0    buPROPion (WELLBUTRIN XL) 150 MG 24 hr tablet Take once daily with 300 XL daily . 90 tablet 1    buPROPion (WELLBUTRIN XL) 300 MG 24 hr tablet Take 1 tablet by mouth every morning (along with 150mg tab). 30 tablet 4    citalopram (CELEXA) 20 MG tablet Take 1 tablet (20 mg) by mouth daily 90 tablet 1    hydrochlorothiazide (HYDRODIURIL) 25 MG tablet Take 1 tablet (25 mg) by mouth daily 90 tablet 1    levothyroxine (SYNTHROID/LEVOTHROID) 112 MCG tablet Take 1 tablet (112 mcg) by mouth daily 90 tablet 3    metroNIDAZOLE (METROCREAM) 0.75 % external cream Apply topically 2 times daily 45 g 0    neomycin-polymyxin-hydrocortisone (CORTISPORIN) 3.5-86589-0 otic solution Place 3 drops into both ears 3 times daily as needed (irritation) 10 mL 0       Medications from List 1 of the standing order (on medications that exclude the use of Paxlovid) that patient is taking: NONE. Is patient taking Alberta's Wort? No  Is patient taking Alberta's Wort or any meds from List 1? No.   Medications from List 2 of the standing order (on meds that provider needs to adjust) that patient is taking: NONE. Is patient on any of the meds from List 2? No.   Medications from List 3 of standing order (on meds that a RN needs to adjust) that patient is taking: alprazolam (Xanax): Is patient taking as needed and less than daily? Yes, instructed patient to stop taking alprazolam while taking Paxlovid and restart alprazolam 3 days after the completion of Paxlovid. Is patient on any meds from List 3? Yes. Patient is on meds from list 3. No meds require a provider visit and at least one med required RN to adjust.     Paxlovid has an approximate 90%  reduction in hospitalization. Paxlovid can possibly cause altered sense of taste, diarrhea (loose, watery stools), high blood pressure, muscle aches.     Would patient like a Paxlovid prescription?   Yes.   Lab Results   Component Value Date    GFRESTIMATED 79 04/18/2023       Was last eGFR reduced? No, eGFR 60 or greater/ No Result on record. Patient can receive the normal renal function dose. Paxlovid Rx sent to [place] [facility]   Cub    Temporary change to home medications: See above.    All medication adjustments (holds, etc) were discussed with the patient and patient was asked to repeat back (teachback) their med adjustment.  Did patient understand med adjustment? Yes, patient repeated back and understood correctly.        Reviewed the following instructions with the patient:    Paxlovid (nimatrelvir and ritonavir)    How it works  Two medicines (nirmatrelvir and ritonavir) are taken together. They stop the virus from growing. Less amount of virus is easier for your body to fight.    How to take  Medicine comes in a daily container with both medicine tablets. Take by mouth twice daily (once in the morning, once at night) for 5 days.  The number of tablets to take varies by patient.  Don't chew or break capsules. Swallow whole.    When to take  Take as soon as possible after positive COVID-19 test result, and within 5 days of your first symptoms.    Possible side effects  Can cause altered sense of taste, diarrhea (loose, watery stools), high blood pressure, muscle aches.    Judy Daniels RN

## 2024-02-02 ENCOUNTER — DOCUMENTATION ONLY (OUTPATIENT)
Dept: FAMILY MEDICINE | Facility: CLINIC | Age: 59
End: 2024-02-02
Payer: COMMERCIAL

## 2024-02-02 NOTE — PROGRESS NOTES
Taylor Baeza has an upcoming lab appointment:    Future Appointments   Date Time Provider Department Center   2/14/2024  9:15 AM [facility]     Patient is scheduled for the following lab(s): metabolic panel    There is no order available. Please review and place either future orders or HMPO (Review of Health Maintenance Protocol Orders), as appropriate.    Health Maintenance Due   Topic    ANNUAL REVIEW OF HM ORDERS     CMP     TSH W/FREE T4 REFLEX      Jennifer Sanchez

## 2024-02-06 DIAGNOSIS — I10 ESSENTIAL HYPERTENSION: Primary | ICD-10-CM

## 2024-02-06 NOTE — TELEPHONE ENCOUNTER
Done   Remind pt to do follow up for her routine annual check,  as it is  is due soon ( and will nee fact to face, as last was ( 04/23)

## 2024-02-13 ENCOUNTER — MYC REFILL (OUTPATIENT)
Dept: FAMILY MEDICINE | Facility: CLINIC | Age: 59
End: 2024-02-13
Payer: COMMERCIAL

## 2024-02-13 DIAGNOSIS — I10 HYPERTENSION, GOAL BELOW 140/90: ICD-10-CM

## 2024-02-13 DIAGNOSIS — I10 ESSENTIAL HYPERTENSION: ICD-10-CM

## 2024-02-13 RX ORDER — HYDROCHLOROTHIAZIDE 25 MG/1
25 TABLET ORAL DAILY
Qty: 90 TABLET | Refills: 1 | OUTPATIENT
Start: 2024-02-13

## 2024-02-14 ENCOUNTER — LAB (OUTPATIENT)
Dept: LAB | Facility: CLINIC | Age: 59
End: 2024-02-14
Payer: COMMERCIAL

## 2024-02-14 DIAGNOSIS — I10 ESSENTIAL HYPERTENSION: ICD-10-CM

## 2024-02-14 DIAGNOSIS — E03.8 OTHER SPECIFIED HYPOTHYROIDISM: ICD-10-CM

## 2024-02-14 LAB
ALBUMIN SERPL BCG-MCNC: 4.4 G/DL (ref 3.5–5.2)
ALP SERPL-CCNC: 73 U/L (ref 40–150)
ALT SERPL W P-5'-P-CCNC: 28 U/L (ref 0–50)
ANION GAP SERPL CALCULATED.3IONS-SCNC: 13 MMOL/L (ref 7–15)
AST SERPL W P-5'-P-CCNC: 22 U/L (ref 0–45)
BILIRUB SERPL-MCNC: 0.3 MG/DL
BUN SERPL-MCNC: 13.9 MG/DL (ref 8–23)
CALCIUM SERPL-MCNC: 9.3 MG/DL (ref 8.6–10)
CHLORIDE SERPL-SCNC: 98 MMOL/L (ref 98–107)
CREAT SERPL-MCNC: 0.72 MG/DL (ref 0.51–0.95)
DEPRECATED HCO3 PLAS-SCNC: 25 MMOL/L (ref 22–29)
EGFRCR SERPLBLD CKD-EPI 2021: >90 ML/MIN/1.73M2
GLUCOSE SERPL-MCNC: 105 MG/DL (ref 70–99)
POTASSIUM SERPL-SCNC: 3.7 MMOL/L (ref 3.4–5.3)
PROT SERPL-MCNC: 7.1 G/DL (ref 6.4–8.3)
SODIUM SERPL-SCNC: 136 MMOL/L (ref 135–145)
TSH SERPL DL<=0.005 MIU/L-ACNC: 2.61 UIU/ML (ref 0.3–4.2)

## 2024-02-14 PROCEDURE — 84443 ASSAY THYROID STIM HORMONE: CPT

## 2024-02-14 PROCEDURE — 80053 COMPREHEN METABOLIC PANEL: CPT

## 2024-02-14 PROCEDURE — 36415 COLL VENOUS BLD VENIPUNCTURE: CPT

## 2024-03-18 ENCOUNTER — MYC MEDICAL ADVICE (OUTPATIENT)
Dept: FAMILY MEDICINE | Facility: CLINIC | Age: 59
End: 2024-03-18
Payer: COMMERCIAL

## 2024-03-19 ENCOUNTER — PATIENT OUTREACH (OUTPATIENT)
Dept: CARE COORDINATION | Facility: CLINIC | Age: 59
End: 2024-03-19
Payer: COMMERCIAL

## 2024-03-19 ENCOUNTER — NURSE TRIAGE (OUTPATIENT)
Dept: FAMILY MEDICINE | Facility: CLINIC | Age: 59
End: 2024-03-19
Payer: COMMERCIAL

## 2024-03-19 NOTE — TELEPHONE ENCOUNTER
"Patient called the clinic and stated that she was having chest pain for the past couple weeks. She stated that the pain would comes and goes and last several hours. She stated that when the pain is really bad she has sweating. She does have a history of HTN and her father had a MI in his 70s. Patient was advised to call 911, but she stated that she felt okay to drive and did not want to call. RN advised patient to be seen in the ED right away. Patient agreed with this plan and had no further questions.    Reason for Disposition   Chest pain lasting longer than 5 minutes and ANY of the following:         Pain is crushing, pressure-like, or heavy         Over 44 years old          Over 30 years old and one cardiac risk factor (e.g diabetes, high blood pressure, high cholesterol, smoker, or family history of heart disease)         History of heart disease (e.g. angina, heart attack, heart failure, bypass surgery, takes nitroglycerin)    Additional Information   Negative: SEVERE difficulty breathing (e.g., struggling for each breath, speaks in single words)   Negative: Difficult to awaken or acting confused (e.g., disoriented, slurred speech)   Negative: Shock suspected (e.g., cold/pale/clammy skin, too weak to stand, low BP, rapid pulse)   Negative: Passed out (i.e., lost consciousness, collapsed and was not responding)    Answer Assessment - Initial Assessment Questions  1. LOCATION: \"Where does it hurt?\"          Left lower ribs    2. RADIATION: \"Does the pain go anywhere else?\" (e.g., into neck, jaw, arms, back)        Back sometimes    3. ONSET: \"When did the chest pain begin?\" (Minutes, hours or days)         Couple weeks    4. PATTERN: \"Does the pain come and go, or has it been constant since it started?\"  \"Does it get worse with exertion?\"         Comes and goes    5. DURATION: \"How long does it last\" (e.g., seconds, minutes, hours)        Hours     6. SEVERITY: \"How bad is the pain?\"  (e.g., Scale 1-10; mild, " "moderate, or severe)     - MILD (1-3): doesn't interfere with normal activities      - MODERATE (4-7): interferes with normal activities or awakens from sleep     - SEVERE (8-10): excruciating pain, unable to do any normal activities          7/10    7. CARDIAC RISK FACTORS: \"Do you have any history of heart problems or risk factors for heart disease?\" (e.g., angina, prior heart attack; diabetes, high blood pressure, high cholesterol, smoker, or strong family history of heart disease)        Hypertension    8. PULMONARY RISK FACTORS: \"Do you have any history of lung disease?\"  (e.g., blood clots in lung, asthma, emphysema, birth control pills)        None    9. CAUSE: \"What do you think is causing the chest pain?\"        Unsure    10. OTHER SYMPTOMS: \"Do you have any other symptoms?\" (e.g., dizziness, nausea, vomiting, sweating, fever, difficulty breathing, cough)          None    11. PREGNANCY: \"Is there any chance you are pregnant?\" \"When was your last menstrual period?\"          No    Protocols used: Chest Pain-A-OH    Kyleigh CUETO [facility] Triage Team    "

## 2024-03-19 NOTE — TELEPHONE ENCOUNTER
Please see nurse triage encounter 3/19/24.    Kyleigh JEREZ RN  [facility] Triage Team

## 2024-04-02 ENCOUNTER — PATIENT OUTREACH (OUTPATIENT)
Dept: CARE COORDINATION | Facility: CLINIC | Age: 59
End: 2024-04-02
Payer: COMMERCIAL

## 2024-05-02 DIAGNOSIS — E03.9 HYPOTHYROIDISM, UNSPECIFIED TYPE: ICD-10-CM

## 2024-05-02 RX ORDER — LEVOTHYROXINE SODIUM 112 UG/1
112 TABLET ORAL DAILY
Qty: 90 TABLET | Refills: 1 | Status: SHIPPED | OUTPATIENT
Start: 2024-05-02 | End: 2024-06-19

## 2024-05-16 DIAGNOSIS — G47.09 OTHER INSOMNIA: ICD-10-CM

## 2024-05-16 DIAGNOSIS — F41.0 PANIC ATTACKS: ICD-10-CM

## 2024-05-16 DIAGNOSIS — I10 HYPERTENSION, GOAL BELOW 140/90: ICD-10-CM

## 2024-05-16 DIAGNOSIS — F41.9 ANXIETY: ICD-10-CM

## 2024-05-16 DIAGNOSIS — F32.0 MILD MAJOR DEPRESSION (H): ICD-10-CM

## 2024-05-16 DIAGNOSIS — I10 ESSENTIAL HYPERTENSION: ICD-10-CM

## 2024-05-16 RX ORDER — CITALOPRAM HYDROBROMIDE 20 MG/1
20 TABLET ORAL DAILY
Qty: 90 TABLET | Refills: 0 | Status: SHIPPED | OUTPATIENT
Start: 2024-05-16 | End: 2024-06-19

## 2024-05-20 RX ORDER — HYDROCHLOROTHIAZIDE 25 MG/1
25 TABLET ORAL DAILY
Qty: 30 TABLET | Refills: 0 | Status: SHIPPED | OUTPATIENT
Start: 2024-05-20 | End: 2024-06-19

## 2024-06-06 DIAGNOSIS — F41.0 PANIC ATTACKS: ICD-10-CM

## 2024-06-06 DIAGNOSIS — F41.9 ANXIETY: ICD-10-CM

## 2024-06-06 DIAGNOSIS — F32.0 MILD MAJOR DEPRESSION (H): ICD-10-CM

## 2024-06-09 RX ORDER — BUPROPION HYDROCHLORIDE 150 MG/1
TABLET ORAL
Qty: 14 TABLET | Refills: 0 | Status: SHIPPED | OUTPATIENT
Start: 2024-06-09 | End: 2024-06-19

## 2024-06-09 NOTE — TELEPHONE ENCOUNTER
Ok for Small  refill.Remind pt to do follow up for med check since she is due.Ok to use my same day to hospital follow up slot , if no appointment available soon enough , bc she really needs to be seen in person

## 2024-06-18 ASSESSMENT — PATIENT HEALTH QUESTIONNAIRE - PHQ9: SUM OF ALL RESPONSES TO PHQ QUESTIONS 1-9: 4

## 2024-06-19 ENCOUNTER — MYC REFILL (OUTPATIENT)
Dept: FAMILY MEDICINE | Facility: CLINIC | Age: 59
End: 2024-06-19

## 2024-06-19 DIAGNOSIS — I10 ESSENTIAL HYPERTENSION: ICD-10-CM

## 2024-06-19 DIAGNOSIS — F41.0 PANIC ATTACKS: ICD-10-CM

## 2024-06-19 DIAGNOSIS — F41.9 ANXIETY: ICD-10-CM

## 2024-06-19 DIAGNOSIS — I10 HYPERTENSION, GOAL BELOW 140/90: ICD-10-CM

## 2024-06-19 DIAGNOSIS — F32.0 MILD MAJOR DEPRESSION (H): ICD-10-CM

## 2024-06-19 DIAGNOSIS — L30.9 DERMATITIS: ICD-10-CM

## 2024-06-19 DIAGNOSIS — G47.09 OTHER INSOMNIA: ICD-10-CM

## 2024-06-19 DIAGNOSIS — E03.9 HYPOTHYROIDISM, UNSPECIFIED TYPE: ICD-10-CM

## 2024-06-19 RX ORDER — NEOMYCIN SULFATE, POLYMYXIN B SULFATE, HYDROCORTISONE 3.5; 10000; 1 MG/ML; [USP'U]/ML; MG/ML
3 SOLUTION/ DROPS AURICULAR (OTIC) 3 TIMES DAILY PRN
Qty: 10 ML | Refills: 0 | OUTPATIENT
Start: 2024-06-19

## 2024-06-19 RX ORDER — BUPROPION HYDROCHLORIDE 150 MG/1
TABLET ORAL
Qty: 30 TABLET | Refills: 0 | Status: SHIPPED | OUTPATIENT
Start: 2024-06-19 | End: 2024-07-19

## 2024-06-19 RX ORDER — BUPROPION HYDROCHLORIDE 300 MG/1
TABLET ORAL
Qty: 30 TABLET | Refills: 4 | Status: SHIPPED | OUTPATIENT
Start: 2024-06-19 | End: 2024-09-12

## 2024-06-19 RX ORDER — LEVOTHYROXINE SODIUM 112 UG/1
112 TABLET ORAL DAILY
Qty: 90 TABLET | Refills: 0 | Status: SHIPPED | OUTPATIENT
Start: 2024-06-19 | End: 2024-09-12

## 2024-06-19 RX ORDER — CITALOPRAM HYDROBROMIDE 20 MG/1
20 TABLET ORAL DAILY
Qty: 30 TABLET | Refills: 0 | Status: SHIPPED | OUTPATIENT
Start: 2024-06-19 | End: 2024-08-22

## 2024-06-19 RX ORDER — HYDROCHLOROTHIAZIDE 25 MG/1
25 TABLET ORAL DAILY
Qty: 30 TABLET | Refills: 0 | Status: SHIPPED | OUTPATIENT
Start: 2024-06-19 | End: 2024-07-18

## 2024-06-19 NOTE — TELEPHONE ENCOUNTER
Last refill   Script refill faxed.   Remind pt to do follow up for med check and labs/ annual routine check , since patient  is past due.   Ok to use my same day to hospital follow up slot , if no appointment available soon enough

## 2024-06-23 DIAGNOSIS — I10 HYPERTENSION, GOAL BELOW 140/90: ICD-10-CM

## 2024-06-23 DIAGNOSIS — I10 ESSENTIAL HYPERTENSION: ICD-10-CM

## 2024-06-24 RX ORDER — HYDROCHLOROTHIAZIDE 25 MG/1
25 TABLET ORAL DAILY
Qty: 30 TABLET | Refills: 0 | OUTPATIENT
Start: 2024-06-24

## 2024-06-29 ENCOUNTER — HEALTH MAINTENANCE LETTER (OUTPATIENT)
Age: 59
End: 2024-06-29

## 2024-07-03 ENCOUNTER — PATIENT OUTREACH (OUTPATIENT)
Dept: CARE COORDINATION | Facility: CLINIC | Age: 59
End: 2024-07-03
Payer: COMMERCIAL

## 2024-07-18 DIAGNOSIS — I10 HYPERTENSION, GOAL BELOW 140/90: ICD-10-CM

## 2024-07-18 DIAGNOSIS — I10 ESSENTIAL HYPERTENSION: ICD-10-CM

## 2024-07-18 RX ORDER — HYDROCHLOROTHIAZIDE 25 MG/1
25 TABLET ORAL DAILY
Qty: 30 TABLET | Refills: 0 | Status: SHIPPED | OUTPATIENT
Start: 2024-07-18 | End: 2024-08-19

## 2024-07-18 NOTE — TELEPHONE ENCOUNTER
Last  refill faxed. Remind pt to do follow up for med check and labs/ annual routine check , since patient  is past due.   Ok to use my same day to hospital follow up slot , if no appointment available soon enough

## 2024-07-19 DIAGNOSIS — F32.0 MILD MAJOR DEPRESSION (H): ICD-10-CM

## 2024-07-19 DIAGNOSIS — F41.9 ANXIETY: ICD-10-CM

## 2024-07-19 DIAGNOSIS — F41.0 PANIC ATTACKS: ICD-10-CM

## 2024-07-19 RX ORDER — BUPROPION HYDROCHLORIDE 150 MG/1
TABLET ORAL
Qty: 30 TABLET | Refills: 0 | Status: SHIPPED | OUTPATIENT
Start: 2024-07-19 | End: 2024-08-19

## 2024-07-19 NOTE — TELEPHONE ENCOUNTER
Pt has appointment scheduled for 9/10/24 with PCP.     Zenobia Grover RN on 7/19/2024 at 11:16 AM

## 2024-07-31 ENCOUNTER — MYC MEDICAL ADVICE (OUTPATIENT)
Dept: FAMILY MEDICINE | Facility: CLINIC | Age: 59
End: 2024-07-31
Payer: COMMERCIAL

## 2024-07-31 DIAGNOSIS — N64.4 BREAST PAIN, LEFT: Primary | ICD-10-CM

## 2024-07-31 NOTE — TELEPHONE ENCOUNTER
Please see mychart from patient and advise appropriate course of action.    Thank you,  Earline Constantino RN

## 2024-07-31 NOTE — TELEPHONE ENCOUNTER
Order placed for cata us , but she needs to be reminded to come in for her routine physical/ breasts exam for any ongoing concerns

## 2024-08-06 ENCOUNTER — HOSPITAL ENCOUNTER (OUTPATIENT)
Dept: MAMMOGRAPHY | Facility: CLINIC | Age: 59
Discharge: HOME | End: 2024-08-06
Attending: FAMILY MEDICINE
Payer: COMMERCIAL

## 2024-08-06 DIAGNOSIS — N64.4 BREAST PAIN, LEFT: ICD-10-CM

## 2024-08-06 PROCEDURE — 77062 BREAST TOMOSYNTHESIS BI: CPT

## 2024-08-17 DIAGNOSIS — I10 HYPERTENSION, GOAL BELOW 140/90: ICD-10-CM

## 2024-08-17 DIAGNOSIS — F41.9 ANXIETY: ICD-10-CM

## 2024-08-17 DIAGNOSIS — F32.0 MILD MAJOR DEPRESSION (H): ICD-10-CM

## 2024-08-17 DIAGNOSIS — I10 ESSENTIAL HYPERTENSION: ICD-10-CM

## 2024-08-17 DIAGNOSIS — F41.0 PANIC ATTACKS: ICD-10-CM

## 2024-08-19 RX ORDER — BUPROPION HYDROCHLORIDE 150 MG/1
TABLET ORAL
Qty: 30 TABLET | Refills: 0 | Status: SHIPPED | OUTPATIENT
Start: 2024-08-19 | End: 2024-09-12

## 2024-08-19 RX ORDER — HYDROCHLOROTHIAZIDE 25 MG/1
25 TABLET ORAL DAILY
Qty: 30 TABLET | Refills: 0 | Status: SHIPPED | OUTPATIENT
Start: 2024-08-19 | End: 2024-09-12

## 2024-08-22 DIAGNOSIS — F41.9 ANXIETY: ICD-10-CM

## 2024-08-22 DIAGNOSIS — F41.0 PANIC ATTACKS: ICD-10-CM

## 2024-08-22 DIAGNOSIS — G47.09 OTHER INSOMNIA: ICD-10-CM

## 2024-08-22 DIAGNOSIS — F32.0 MILD MAJOR DEPRESSION (H): ICD-10-CM

## 2024-08-22 RX ORDER — CITALOPRAM HYDROBROMIDE 20 MG/1
20 TABLET ORAL DAILY
Qty: 90 TABLET | Refills: 0 | Status: SHIPPED | OUTPATIENT
Start: 2024-08-22

## 2024-08-22 NOTE — TELEPHONE ENCOUNTER
Covering for Scott  Upcoming appointment is scheduled  90 day supply sent  Loree Richards PA-C on 8/22/2024 at 8:50 AM

## 2024-09-06 ASSESSMENT — SOCIAL DETERMINANTS OF HEALTH (SDOH): HOW OFTEN DO YOU GET TOGETHER WITH FRIENDS OR RELATIVES?: ONCE A WEEK

## 2024-09-10 ASSESSMENT — PATIENT HEALTH QUESTIONNAIRE - PHQ9
10. IF YOU CHECKED OFF ANY PROBLEMS, HOW DIFFICULT HAVE THESE PROBLEMS MADE IT FOR YOU TO DO YOUR WORK, TAKE CARE OF THINGS AT HOME, OR GET ALONG WITH OTHER PEOPLE: NOT DIFFICULT AT ALL
SUM OF ALL RESPONSES TO PHQ QUESTIONS 1-9: 4

## 2024-09-12 ENCOUNTER — OFFICE VISIT (OUTPATIENT)
Dept: FAMILY MEDICINE | Facility: CLINIC | Age: 59
End: 2024-09-12
Payer: COMMERCIAL

## 2024-09-12 VITALS
WEIGHT: 197.4 LBS | DIASTOLIC BLOOD PRESSURE: 80 MMHG | BODY MASS INDEX: 34.98 KG/M2 | SYSTOLIC BLOOD PRESSURE: 122 MMHG | OXYGEN SATURATION: 98 % | TEMPERATURE: 98.9 F | HEIGHT: 63 IN | HEART RATE: 64 BPM | RESPIRATION RATE: 21 BRPM

## 2024-09-12 DIAGNOSIS — L30.9 DERMATITIS: ICD-10-CM

## 2024-09-12 DIAGNOSIS — Z23 NEEDS FLU SHOT: ICD-10-CM

## 2024-09-12 DIAGNOSIS — H91.91 DECREASED HEARING, RIGHT: ICD-10-CM

## 2024-09-12 DIAGNOSIS — E66.01 MORBID OBESITY (H): ICD-10-CM

## 2024-09-12 DIAGNOSIS — E03.9 HYPOTHYROIDISM, UNSPECIFIED TYPE: ICD-10-CM

## 2024-09-12 DIAGNOSIS — S86.011S: ICD-10-CM

## 2024-09-12 DIAGNOSIS — F32.0 MILD MAJOR DEPRESSION (H): ICD-10-CM

## 2024-09-12 DIAGNOSIS — Z00.00 ROUTINE GENERAL MEDICAL EXAMINATION AT A HEALTH CARE FACILITY: Primary | ICD-10-CM

## 2024-09-12 DIAGNOSIS — E55.9 VITAMIN D DEFICIENCY: ICD-10-CM

## 2024-09-12 DIAGNOSIS — F41.9 ANXIETY: ICD-10-CM

## 2024-09-12 DIAGNOSIS — F41.0 PANIC ATTACKS: ICD-10-CM

## 2024-09-12 DIAGNOSIS — I10 HYPERTENSION, GOAL BELOW 140/90: ICD-10-CM

## 2024-09-12 DIAGNOSIS — M72.2 PLANTAR FASCIITIS: ICD-10-CM

## 2024-09-12 DIAGNOSIS — M79.661 PAIN OF RIGHT LOWER LEG: ICD-10-CM

## 2024-09-12 DIAGNOSIS — I10 ESSENTIAL HYPERTENSION: ICD-10-CM

## 2024-09-12 LAB
ALBUMIN SERPL BCG-MCNC: 4.6 G/DL (ref 3.5–5.2)
ALP SERPL-CCNC: 73 U/L (ref 40–150)
ALT SERPL W P-5'-P-CCNC: 21 U/L (ref 0–50)
ANION GAP SERPL CALCULATED.3IONS-SCNC: 12 MMOL/L (ref 7–15)
AST SERPL W P-5'-P-CCNC: 23 U/L (ref 0–45)
BILIRUB SERPL-MCNC: 0.4 MG/DL
BUN SERPL-MCNC: 12.1 MG/DL (ref 8–23)
CALCIUM SERPL-MCNC: 9.6 MG/DL (ref 8.8–10.4)
CHLORIDE SERPL-SCNC: 99 MMOL/L (ref 98–107)
CHOLEST SERPL-MCNC: 215 MG/DL
CREAT SERPL-MCNC: 0.71 MG/DL (ref 0.51–0.95)
EGFRCR SERPLBLD CKD-EPI 2021: >90 ML/MIN/1.73M2
FASTING STATUS PATIENT QL REPORTED: YES
FASTING STATUS PATIENT QL REPORTED: YES
GLUCOSE SERPL-MCNC: 98 MG/DL (ref 70–99)
HCO3 SERPL-SCNC: 27 MMOL/L (ref 22–29)
HDLC SERPL-MCNC: 62 MG/DL
LDLC SERPL CALC-MCNC: 135 MG/DL
MAGNESIUM SERPL-MCNC: 1.9 MG/DL (ref 1.7–2.3)
NONHDLC SERPL-MCNC: 153 MG/DL
POTASSIUM SERPL-SCNC: 3.7 MMOL/L (ref 3.4–5.3)
PROT SERPL-MCNC: 7.5 G/DL (ref 6.4–8.3)
SODIUM SERPL-SCNC: 138 MMOL/L (ref 135–145)
TRIGL SERPL-MCNC: 92 MG/DL
TSH SERPL DL<=0.005 MIU/L-ACNC: 3.95 UIU/ML (ref 0.3–4.2)
VIT D+METAB SERPL-MCNC: 40 NG/ML (ref 20–50)

## 2024-09-12 PROCEDURE — 84443 ASSAY THYROID STIM HORMONE: CPT | Performed by: FAMILY MEDICINE

## 2024-09-12 PROCEDURE — 36415 COLL VENOUS BLD VENIPUNCTURE: CPT | Performed by: FAMILY MEDICINE

## 2024-09-12 PROCEDURE — 99214 OFFICE O/P EST MOD 30 MIN: CPT | Mod: 25 | Performed by: FAMILY MEDICINE

## 2024-09-12 PROCEDURE — 82306 VITAMIN D 25 HYDROXY: CPT | Performed by: FAMILY MEDICINE

## 2024-09-12 PROCEDURE — 96127 BRIEF EMOTIONAL/BEHAV ASSMT: CPT | Performed by: FAMILY MEDICINE

## 2024-09-12 PROCEDURE — 90471 IMMUNIZATION ADMIN: CPT | Performed by: FAMILY MEDICINE

## 2024-09-12 PROCEDURE — 83735 ASSAY OF MAGNESIUM: CPT | Performed by: FAMILY MEDICINE

## 2024-09-12 PROCEDURE — 80061 LIPID PANEL: CPT | Performed by: FAMILY MEDICINE

## 2024-09-12 PROCEDURE — 80053 COMPREHEN METABOLIC PANEL: CPT | Performed by: FAMILY MEDICINE

## 2024-09-12 PROCEDURE — 90673 RIV3 VACCINE NO PRESERV IM: CPT | Performed by: FAMILY MEDICINE

## 2024-09-12 PROCEDURE — 99396 PREV VISIT EST AGE 40-64: CPT | Mod: 25 | Performed by: FAMILY MEDICINE

## 2024-09-12 RX ORDER — HYDROCHLOROTHIAZIDE 25 MG/1
25 TABLET ORAL DAILY
Qty: 90 TABLET | Refills: 1 | Status: SHIPPED | OUTPATIENT
Start: 2024-09-12

## 2024-09-12 RX ORDER — BUPROPION HYDROCHLORIDE 150 MG/1
TABLET ORAL
Qty: 30 TABLET | Refills: 0 | Status: CANCELLED | OUTPATIENT
Start: 2024-09-12

## 2024-09-12 RX ORDER — NEOMYCIN SULFATE, POLYMYXIN B SULFATE, HYDROCORTISONE 3.5; 10000; 1 MG/ML; [USP'U]/ML; MG/ML
3 SOLUTION/ DROPS AURICULAR (OTIC) 3 TIMES DAILY PRN
Qty: 10 ML | Refills: 0 | Status: SHIPPED | OUTPATIENT
Start: 2024-09-12

## 2024-09-12 RX ORDER — BUPROPION HYDROCHLORIDE 300 MG/1
TABLET ORAL
Qty: 90 TABLET | Refills: 1 | Status: SHIPPED | OUTPATIENT
Start: 2024-09-12

## 2024-09-12 RX ORDER — LEVOTHYROXINE SODIUM 112 UG/1
112 TABLET ORAL DAILY
Qty: 90 TABLET | Refills: 3 | Status: SHIPPED | OUTPATIENT
Start: 2024-09-12

## 2024-09-12 RX ORDER — FAMOTIDINE 20 MG
TABLET ORAL
Status: CANCELLED | OUTPATIENT
Start: 2024-09-12

## 2024-09-12 ASSESSMENT — ANXIETY QUESTIONNAIRES
1. FEELING NERVOUS, ANXIOUS, OR ON EDGE: SEVERAL DAYS
3. WORRYING TOO MUCH ABOUT DIFFERENT THINGS: NOT AT ALL
2. NOT BEING ABLE TO STOP OR CONTROL WORRYING: NOT AT ALL
IF YOU CHECKED OFF ANY PROBLEMS ON THIS QUESTIONNAIRE, HOW DIFFICULT HAVE THESE PROBLEMS MADE IT FOR YOU TO DO YOUR WORK, TAKE CARE OF THINGS AT HOME, OR GET ALONG WITH OTHER PEOPLE: NOT DIFFICULT AT ALL
7. FEELING AFRAID AS IF SOMETHING AWFUL MIGHT HAPPEN: NOT AT ALL
GAD7 TOTAL SCORE: 1
GAD7 TOTAL SCORE: 1
6. BECOMING EASILY ANNOYED OR IRRITABLE: NOT AT ALL
5. BEING SO RESTLESS THAT IT IS HARD TO SIT STILL: NOT AT ALL

## 2024-09-12 ASSESSMENT — PAIN SCALES - GENERAL: PAINLEVEL: NO PAIN (0)

## 2024-09-12 ASSESSMENT — PATIENT HEALTH QUESTIONNAIRE - PHQ9: 5. POOR APPETITE OR OVEREATING: NOT AT ALL

## 2024-09-12 NOTE — PROGRESS NOTES
Epidural    Patient location during procedure: OB   Reason for block: primary anesthetic   Reason for block: labor analgesia requested by patient and obstetrician  Diagnosis: Intrauterine Pregnancy   Start time: 8/23/2024 3:37 PM  Timeout: 8/23/2024 3:37 PM  End time: 8/23/2024 3:37 PM    Staffing  Performing Provider: Lion Leong MD  Authorizing Provider: Lion Leong MD    Staffing  Performed by: Lion Leong MD  Authorized by: Lion Leong MD        Preanesthetic Checklist  Completed: patient identified, IV checked, risks and benefits discussed, monitors and equipment checked, pre-op evaluation, timeout performed, anesthesia consent given, hand hygiene performed and patient being monitored  Preparation  Patient position: sitting  Prep: ChloraPrep  Patient monitoring: ECG and Blood Pressure  Reason for block: primary anesthetic   Epidural  Skin Anesthetic: lidocaine 1%  Skin Wheal: 3 mL  Administration type: single shot  Approach: midline  Interspace: L3-4    Injection technique: MAVERICK air  Needle and Epidural Catheter  Needle type: Tuohy   Needle gauge: 17  Needle length: 3.5 inches  Needle insertion depth: 6 cm  Catheter type: springwound and multi-orifice  Catheter size: 18 G  Catheter at skin depth: 13 cm  Insertion Attempts: 1  Test dose: 3 mL of lidocaine 1.5% with Epi 1-to-200,000  Additional Documentation: negative aspiration for heme and CSF, incremental injection, no signs/symptoms of IV or SA injection, no significant complaints from patient, no paresthesia on injection and no significant pain on injection  Needle localization: anatomical landmarks  Medications:  Volume per aspiration: 5 mL   Assessment  Ease of block: easy  Patient's tolerance of the procedure: comfortable throughout block and no complaints  Additional Notes  10 ml of 0.2% Ropivacaine given through epidural catheter in 5 ml increments.    No inadvertent dural puncture with Tuohy.  Dural puncture not performed with  Preventive Care Visit  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 Willing to deccrease the dose of Wellbutrin back to 300  mg. Pros/ cons of med's discussed .        spent sometimes counseling patient. Follow up in 3-6  months, but sooner if problem.     (F41.9) Anxiety  Comment:   Plan: improved and stable     (F41.0) Panic attacks  Comment: improved   Plan: ,not needing xanax any more    (E55.9) Vitamin D deficiency  Comment:   Plan: Vitamin D Deficiency            (E66.01) Morbid obesity (H)  Comment:   Plan: Healthy diet and exercise reviewed..  Risks of obesity discussed.  Encourage exercise.       (I10) Essential hypertension  Comment:   Plan: hydrochlorothiazide (HYDRODIURIL) 25 MG tablet,        Comprehensive metabolic panel          BP in adequate control. Discussed cares, low fat low salt  diet etc. Check lab. Refill given. encouraged home BP monitoring.        Follow up recheck in 6 months, sooner if problem.       (M79.661) Pain of right lower leg  Comment: calf heel rt- likely related achillis strain/ planter fascitis   Plan: Magnesium, Comprehensive metabolic panel,         Orthopedic  Referral            (S86.011S) Strain of Achilles tendon, right, sequela  Comment:   Plan: Orthopedic  Referral    (M72.2) Plantar fasciitis  Comment:   Plan: Orthopedic  Referral      Discussed feet  "cares. Talked about comfortable shoes, orthotics to support etc.    discussed  cares and symptomatic treatment including  adequate pain control,  stretches etc.  she will do follow up if no improvement or problem. Consider further evaluation and  physical therapy if needed.     Talked abut feet cares  feet.  Because of ongoing problem, consider  podiatry evaluation and referral given. . She  will follow up here as needed         (H91.91) Decreased hearing, right  Comment: x 1 year , also has just some discomfort in rt ear  Plan: Adult ENT  Referral        Discussed possible differential diagnosis for her symptoms. Tale about possible ETD, may be mild allergy related sx etc , although she does not sound like has any other significant allergy related sx of congestion etc at this time.    but bc of her ongoing concerns , referral given to ENT for further evaluation         (Z23) Needs flu shot  Comment:   Plan: INFLUENZA VACCINE IM 18-64 YRS (FLUBLOK)            Check labs. refill sent.Cares and  treatment discussed.  follow up if problem   Patient expressed understanding and agreement with treatment plan. All patient's questions were answered, will let me know if has more later.  Medications: Rx's: Reviewed the potential side effects/complications of medications prescribed.           BMI  Estimated body mass index is 34.67 kg/m  as calculated from the following:    Height as of this encounter: 1.607 m (5' 3.27\").    Weight as of this encounter: 89.5 kg (197 lb 6.4 oz).   Weight management plan: Discussed healthy diet and exercise guidelines    Counseling  Appropriate preventive services were addressed with this patient via screening, questionnaire, or discussion as appropriate for fall prevention, nutrition, physical activity, Tobacco-use cessation, social engagement, weight loss and cognition.  Checklist reviewing preventive services available has been given to the patient.  Reviewed patient's diet, " spinal needle             addressing concerns and/or questions.   She is at risk for lack of exercise and has been provided with information to increase physical activity for the benefit of her well-being.   She is at risk for psychosocial distress and has been provided with information to reduce risk.       Work on weight loss  Regular exercise  See Patient Instructions    Jessica Vazquez is a 59 year old, presenting for the following:  Physical        9/12/2024     9:56 AM   Additional Questions   Roomed by Xiomara MUNSON       Health Care Directive  Patient does not have a Health Care Directive or Living Will: Discussed advance care planning with patient; however, patient declined at this time.    Healthy Habits:     Getting at least 3 servings of Calcium per day:  Yes    Bi-annual eye exam:  Yes    Dental care twice a year:  Yes    Sleep apnea or symptoms of sleep apnea:  None    Diet:  Regular (no restrictions) and Other    Frequency of exercise:  2-3 days/week    Duration of exercise:  15-30 minutes    Taking medications regularly:  Yes    Barriers to taking medications:  None    Medication side effects:  None    Additional concerns today:  No      ADD ON PROBLEMS     Sh has ongoing issue with rt leg - tight calf/ heel area and it has been ongoing issue for a long time. Also   has know hx of planter fascitis / neuropathy in rt foot diagnosed long time ago , although that  has not changed but  her heel and foot can hurt often.    in am, if she steps down on her rt foot it hurts. , walking can be uncomfrtable too . had PT  a while ago but  did not do enough for he heel/leg and  planter fascitis,  so debating if she can see podiatry     Depression and Anxiety   How are you doing with your depression since your last visit? Improved she is now   tapering her  Wellbutrin 150 mg dose ,  that was added earlier. she thinks  her mood has been  ok now and she is comfortable just tapering it off for now.   Has noted that she is hot and sweaty often and  may be lower dose has helped but wants to do  thyroid check as well   How are you doing with your anxiety since your last visit?  Improved not needing xanax much now for any acute anxiety or panic attack . Less stress currently in life after she retired   Are you having other symptoms that might be associated with depression or anxiety? No  Have you had a significant life event? No   Do you have any concerns with your use of alcohol or other drugs? No    Social History     Tobacco Use    Smoking status: Never    Smokeless tobacco: Never   Substance Use Topics    Alcohol use: Yes     Alcohol/week: 0.0 standard drinks of alcohol     Comment: 2 x week     Drug use: No         4/18/2023    10:27 AM 11/21/2023     7:17 AM 6/18/2024     9:24 AM   PHQ   PHQ-9 Total Score 4 2 4    4    4   Q9: Thoughts of better off dead/self-harm past 2 weeks Not at all Not at all Not at all         8/4/2022     9:24 AM 10/26/2023     5:35 PM 11/21/2023     7:20 AM   GAVIN-7 SCORE   Total Score 5 (mild anxiety) 16 (severe anxiety) 5 (mild anxiety)   Total Score 5 16 5         9/12/2024    11:10 AM   Last PHQ-9   1.  Little interest or pleasure in doing things 0   2.  Feeling down, depressed, or hopeless 0   3.  Trouble falling or staying asleep, or sleeping too much 0   4.  Feeling tired or having little energy 1   5.  Poor appetite or overeating 1   6.  Feeling bad about yourself 0   7.  Trouble concentrating 0   8.  Moving slowly or restless 0   Q9: Thoughts of better off dead/self-harm past 2 weeks 0   PHQ-9 Total Score 2   Difficulty at work, home, or with people Not difficult at all         9/12/2024    11:10 AM   GAVIN-7    1. Feeling nervous, anxious, or on edge 1   2. Not being able to stop or control worrying 0   3. Worrying too much about different things 0   4. Trouble relaxing 0   5. Being so restless that it is hard to sit still 0   6. Becoming easily annoyed or irritable 0   7. Feeling afraid, as if something awful might happen 0    GAVIN-7 Total Score 1   If you checked any problems, how difficult have they made it for you to do your work, take care of things at home, or get along with other people? Not difficult at all       Suicide Assessment Five-step Evaluation and Treatment (SAFE-T)    Hypertension Follow-up    Do you check your blood pressure regularly outside of the clinic? Yes   Are you following a low salt diet? Yes  Are your blood pressures ever more than 140 on the top number (systolic) OR more   than 90 on the bottom number (diastolic), for example 140/90? No        9/12/2024   General Health   How would you rate your overall physical health? (!) FAIR   Feel stress (tense, anxious, or unable to sleep) Only a little      (!) STRESS CONCERN      9/12/2024   Nutrition   Three or more servings of calcium each day? Yes   Diet: Regular (no restrictions)    Other   If other, please elaborate: diary free   How many servings of fruit and vegetables per day? 4 or more   How many sweetened beverages each day? 0-1       Multiple values from one day are sorted in reverse-chronological order         9/12/2024   Exercise   Days per week of moderate/strenous exercise 2 days   Average minutes spent exercising at this level 20 min      (!) EXERCISE CONCERN      9/12/2024   Social Factors   Frequency of gathering with friends or relatives Once a week   Worry food won't last until get money to buy more No   Food not last or not have enough money for food? No   Do you have housing? (Housing is defined as stable permanent housing and does not include staying ouside in a car, in a tent, in an abandoned building, in an overnight shelter, or couch-surfing.) Yes   Are you worried about losing your housing? No   Lack of transportation? No   Unable to get utilities (heat,electricity)? No            9/12/2024   Fall Risk   Fallen 2 or more times in the past year? No   Trouble with walking or balance? Yes              9/12/2024   Dental   Dentist two times  every year? Yes             Today's PHQ-9 Score:       2024     9:24 AM   PHQ-9 SCORE   PHQ-9 Total Score MyChart 4 (Minimal depression)   PHQ-9 Total Score 4    4    4         2024   Substance Use   Alcohol more than 3/day or more than 7/wk Not Applicable   Do you use any other substances recreationally? No        Social History     Tobacco Use    Smoking status: Never    Smokeless tobacco: Never   Substance Use Topics    Alcohol use: Yes     Alcohol/week: 0.0 standard drinks of alcohol     Comment: 2 x week     Drug use: No           2024   LAST FHS-7 RESULTS   1st degree relative breast or ovarian cancer No   Any relative bilateral breast cancer No   Any male have breast cancer No   Any ONE woman have BOTH breast AND ovarian cancer No   Any woman with breast cancer before 50yrs No   2 or more relatives with breast AND/OR ovarian cancer No   2 or more relatives with breast AND/OR bowel cancer No           Mammogram Screening - Mammogram every 1-2 years updated in Health Maintenance based on mutual decision making        2024   STI Screening   New sexual partner(s) since last STI/HIV test? No        History of abnormal Pap smear: No - age 30- 64 PAP with HPV every 5 years recommended        Latest Ref Rng & Units 2023     3:12 PM 10/31/2017     1:55 PM 10/31/2017    12:52 PM   PAP / HPV   PAP  Negative for Intraepithelial Lesion or Malignancy (NILM)      PAP (Historical)    NIL    HPV 16 DNA Negative Negative  Negative     HPV 18 DNA Negative Negative  Negative     Other HR HPV Negative Negative  Negative       ASCVD Risk   The ASCVD Risk score (iNki KAPADIA, et al., 2019) failed to calculate for the following reasons:    The systolic blood pressure is missing    Fracture Risk Assessment Tool  Link to Frax Calculator  Use the information below to complete the Frax calculator  : 1965  Sex: female  Weight (kg): 89.5 kg (actual weight)  Height (cm): 160.7 cm  Previous Fragility  Fracture:  No  History of parent with fractured hip:  No  Current Smoking:  No  Patient has been on glucocorticoids for more than 3 months (5mg/day or more): No  Rheumatoid Arthritis on Problem List:  No  Secondary Osteoporosis on Problem List:  No  Consumes 3 or more units of alcohol per day: No  Femoral Neck BMD (g/cm2)           Reviewed and updated as needed this visit by Provider                    Patient Active Problem List   Diagnosis    Morbid obesity (H)    Rosacea    CARDIOVASCULAR SCREENING; LDL GOAL LESS THAN 130    Plantar fasciitis    Anxiety    Enthesopathy of ankle and tarsus    Snoring    GERD (gastroesophageal reflux disease)    Hypertension, goal below 140/90    Panic attacks    Vitamin D deficiency    Paresthesia    Hyperglycemia    Sleep disorder    Mild major depression (H)    Cystic acne    Other specified hypothyroidism    Hyperlipidemia LDL goal <130    Essential hypertension    Other insomnia    Idiopathic neuropathy    BMI 35.0-35.9,adult    Diverticulosis of colon    Irritable bowel syndrome, unspecified type    Right foot pain    Hip pain, right     Past Surgical History:   Procedure Laterality Date    BREAST SURGERY      breast reduction    COLONOSCOPY N/A 10/19/2017    Procedure: COMBINED COLONOSCOPY, SINGLE OR MULTIPLE BIOPSY/POLYPECTOMY BY BIOPSY;  colonoscopy;  Surgeon: Marquise Haskins MD;  Location:  GI    TONSILLECTOMY  1970       Social History     Tobacco Use    Smoking status: Never    Smokeless tobacco: Never   Substance Use Topics    Alcohol use: Yes     Alcohol/week: 0.0 standard drinks of alcohol     Comment: 2 x week      Family History   Problem Relation Age of Onset    Diabetes Sister     Thyroid Disease Father     Hypertension Father     Hypertension Mother     Diabetes Maternal Grandmother     Diabetes Sister     Thyroid Disease Sister     Breast Cancer No family hx of     Cancer - colorectal No family hx of     C.A.D. No family hx of              Review of  "Systems  CONSTITUTIONAL: NEGATIVE for fever, chills, change in weight  INTEGUMENTARY/SKIN: NEGATIVE for worrisome rashes, moles or lesions  EYES: NEGATIVE for vision changes or irritation  ENT/MOUTH: NEGATIVE for ear, mouth and throat problems  RESP: NEGATIVE for significant cough or SOB  BREAST: NEGATIVE for masses, tenderness or discharge  CV: NEGATIVE for chest pain, palpitations or peripheral edema  GI: NEGATIVE for nausea, abdominal pain, heartburn, or change in bowel habits  : NEGATIVE for frequency, dysuria, or hematuria  MUSCULOSKELETAL: NEGATIVE for significant arthralgias or myalgia  MUSCULOSKELETAL:POSITIVE  for rt leg/ foot ain as per HPI   NEURO: NEGATIVE for weakness, dizziness or paresthesias  ENDOCRINE: NEGATIVE for temperature intolerance, skin/hair changes  HEME: NEGATIVE for bleeding problems  PSYCHIATRIC: NEGATIVE for changes in mood or affect  PSYCHIATRIC: HX anxiety and HX depression, as per HPI      Objective    Exam  LMP  (LMP Unknown)    Estimated body mass index is 35.39 kg/m  as calculated from the following:    Height as of 4/18/23: 1.605 m (5' 3.19\").    Weight as of 4/18/23: 91.2 kg (201 lb).    Physical Exam  GENERAL: alert and no distress  EYES: Eyes grossly normal to inspection, PERRL and conjunctivae and sclerae normal  HENT: ear canals and TM's normal, nose and mouth without ulcers or lesions  NECK: no adenopathy, no asymmetry, masses, or scars  RESP: lungs clear to auscultation - no rales, rhonchi or wheezes  CV: regular rate and rhythm, normal S1 S2, no S3 or S4, no murmur, click or rub, no peripheral edema  ABDOMEN: soft, nontender, no hepatosplenomegaly, no masses and bowel sounds normal  MS: no gross musculoskeletal defects noted, no leg edema  MS: both  knees and ankles with normal range of motion, except RT LEG with some  tenderness to palpation distal calf and achillis area on rt leg . Rt foot also has tenderness across medial arch and bottom of her heel ,  SKIN: no " suspicious lesions or rashes  NEURO: Normal strength and tone, mentation intact and speech normal  PSYCH: mentation appears normal, affect normal/bright        Signed Electronically by: Ashley Chavez MD    Answers submitted by the patient for this visit:  Patient Health Questionnaire (Submitted on 9/10/2024)  If you checked off any problems, how difficult have these problems made it for you to do your work, take care of things at home, or get along with other people?: Not difficult at all  PHQ9 TOTAL SCORE: 4

## 2024-09-16 ENCOUNTER — PATIENT OUTREACH (OUTPATIENT)
Dept: CARE COORDINATION | Facility: CLINIC | Age: 59
End: 2024-09-16
Payer: COMMERCIAL

## 2024-09-17 DIAGNOSIS — F41.0 PANIC ATTACKS: ICD-10-CM

## 2024-09-17 DIAGNOSIS — F32.0 MILD MAJOR DEPRESSION (H): ICD-10-CM

## 2024-09-17 DIAGNOSIS — F41.9 ANXIETY: ICD-10-CM

## 2024-09-17 NOTE — TELEPHONE ENCOUNTER
Clinic RN: Please contact patient because the medication is listed as historical or discontinued. Confirm patient is taking this medication. Document findings and route refill encounter to provider for approval or denial. (Changed in dosing)    Kyleigh Pablo RN  [facility] Triage Team

## 2024-09-18 NOTE — TELEPHONE ENCOUNTER
Triage Patient Outreach    Attempt # 1    Was call answered?  No.  Left voicemail to return call to Triage at Primary Clinic    Kyleigh Pablo RN

## 2024-09-19 RX ORDER — BUPROPION HYDROCHLORIDE 150 MG/1
TABLET ORAL
Qty: 30 TABLET | Refills: 0 | OUTPATIENT
Start: 2024-09-19

## 2024-09-30 ENCOUNTER — OFFICE VISIT (OUTPATIENT)
Dept: FAMILY MEDICINE | Facility: CLINIC | Age: 59
End: 2024-09-30
Payer: COMMERCIAL

## 2024-09-30 VITALS
SYSTOLIC BLOOD PRESSURE: 128 MMHG | OXYGEN SATURATION: 97 % | HEIGHT: 63 IN | DIASTOLIC BLOOD PRESSURE: 86 MMHG | TEMPERATURE: 97.7 F | BODY MASS INDEX: 35.26 KG/M2 | RESPIRATION RATE: 16 BRPM | HEART RATE: 67 BPM | WEIGHT: 199 LBS

## 2024-09-30 DIAGNOSIS — H25.89 OTHER AGE-RELATED CATARACT OF BOTH EYES: ICD-10-CM

## 2024-09-30 DIAGNOSIS — Z01.818 PRE-OP EXAM: Primary | ICD-10-CM

## 2024-09-30 PROCEDURE — 99213 OFFICE O/P EST LOW 20 MIN: CPT | Performed by: PHYSICIAN ASSISTANT

## 2024-09-30 ASSESSMENT — PAIN SCALES - GENERAL: PAINLEVEL: NO PAIN (0)

## 2024-09-30 NOTE — PROGRESS NOTES
Preoperative Evaluation  [address] [address]  Phone: [phone]  Primary Provider: Lacey Waite PA-C  Pre-op Performing Provider: Lacey Waiet PA-C  Sep 30, 2024             9/27/2024   Surgical Information   What procedure is being done? Cateract surgery both eyes   Facility or Hospital where procedure/surgery will be performed: [facility]   Who is doing the procedure / surgery? GALA BENITEZ MD   Date of surgery / procedure: 10/2/24 and 10/16/24   Time of surgery / procedure: Both at 8:30 am   Where do you plan to recover after surgery? at home with family        Fax number for surgical facility: [phone]    Assessment & Plan     The proposed surgical procedure is considered LOW risk.      ICD-10-CM    1. Pre-op exam  Z01.818       2. Other age-related cataract of both eyes  H25.89          - No identified additional risk factors other than previously addressed    Antiplatelet or Anticoagulation Medication Instructions   - Patient is on no antiplatelet or anticoagulation medications.    Additional Medication Instructions  Take all scheduled medications on the day of surgery EXCEPT for modifications listed below:   - Diuretics: take on day of surgery   - SSRIs, SNRIs, TCAs, Antipsychotics: Continue without modification.     Recommendation  Approval given to proceed with proposed procedure, without further diagnostic evaluation.    Jessica Vazquez is a 59 year old, presenting for the following:  Pre-Op Exam          9/30/2024     2:34 PM   Additional Questions   Roomed by Angel CARDENAS related to upcoming procedure: patient with bilat symptomatic cataract. Undergoing surgical repair on 10/2/2024 & 10/16/2024        9/27/2024   Pre-Op Questionnaire   Have you ever had a heart attack or stroke? No   Have you ever had surgery on your heart or blood vessels, such as a stent placement, a coronary artery bypass, or  surgery on an artery in your head, neck, heart, or legs? No   Do you have chest pain with activity? No   Do you have a history of heart failure? No   Do you currently have a cold, bronchitis or symptoms of other infection? No   Do you have a cough, shortness of breath, or wheezing? No   Do you or anyone in your family have previous history of blood clots? No   Do you or does anyone in your family have a serious bleeding problem such as prolonged bleeding following surgeries or cuts? No   Have you ever had problems with anemia or been told to take iron pills? No   Have you had any abnormal blood loss such as black, tarry or bloody stools, or abnormal vaginal bleeding? No   Have you ever had a blood transfusion? No   Are you willing to have a blood transfusion if it is medically needed before, during, or after your surgery? Yes   Have you or any of your relatives ever had problems with anesthesia? No   Do you have sleep apnea, excessive snoring or daytime drowsiness? No   Do you have any artifical heart valves or other implanted medical devices like a pacemaker, defibrillator, or continuous glucose monitor? No   Do you have artificial joints? No   Are you allergic to latex? No        Health Care Directive  Patient does not have a Health Care Directive or Living Will: Discussed advance care planning with patient; however, patient declined at this time.    Preoperative Review of    reviewed - controlled substances reflected in medication list.      Status of Chronic Conditions:  See problem list for active medical problems.  Problems all longstanding and stable, except as noted/documented.  See ROS for pertinent symptoms related to these conditions.    Patient Active Problem List    Diagnosis Date Noted    Right foot pain 01/17/2023     Priority: Medium    Hip pain, right 01/17/2023     Priority: Medium    Irritable bowel syndrome, unspecified type 12/13/2022     Priority: Medium     on and off diarrhea - stable        Diverticulosis of colon 04/10/2019     Priority: Medium     per colonoscopy 2017      BMI 35.0-35.9,adult 03/29/2018     Priority: Medium    Idiopathic neuropathy 05/02/2017     Priority: Medium    Other insomnia 08/29/2016     Priority: Medium    Essential hypertension      Priority: Medium    Other specified hypothyroidism 08/17/2016     Priority: Medium    Hyperlipidemia LDL goal <130 08/17/2016     Priority: Medium    Cystic acne 04/06/2015     Priority: Medium    Mild major depression (H) 02/21/2014     Priority: Medium    Vitamin D deficiency 10/08/2012     Priority: Medium    Paresthesia 10/08/2012     Priority: Medium    Hyperglycemia 10/08/2012     Priority: Medium    Sleep disorder 10/08/2012     Priority: Medium    Hypertension, goal below 140/90 08/31/2012     Priority: Medium    Panic attacks 08/31/2012     Priority: Medium    GERD (gastroesophageal reflux disease) 08/21/2012     Priority: Medium    Snoring 08/17/2012     Priority: Medium     Sleep study 8/12 negative- AHI of 0.5 and total RDI of 0.9. The lowest oxygen saturation was 90%. There was evidence for sleep disordered breathing with flattening of the inspiratory flow loops and some arousals in association with variability in the amplitudes of the pressure transducer that were not scored as RERAs        Enthesopathy of ankle and tarsus 08/13/2012     Priority: Medium     Problem list name updated by automated process. Provider to review      Plantar fasciitis      Priority: Medium    Anxiety      Priority: Medium     off medication ( citalopram) 4/2012      Morbid obesity (H) 07/12/2012     Priority: Medium    Rosacea 07/12/2012     Priority: Medium    CARDIOVASCULAR SCREENING; LDL GOAL LESS THAN 130 07/12/2012     Priority: Medium      Past Medical History:   Diagnosis Date    Anxiety     off medication ( citalopram) 4/2012, psych     Atypical chest pain     stress test - ve 2009    Decreased libido 09/2008    used injection of  testosterone and cream and felt great but levels became supertherapeutic so stopped it    Depressive disorder     Dyspepsia     GERD (gastroesophageal reflux disease)     H/O infertility     Hypertension     Hypothyroid     Neuropathy     idopathic-SSRIs improve symptoms.     Obesity     Plantar fasciitis     Sleep disturbance      Past Surgical History:   Procedure Laterality Date    BREAST SURGERY      breast reduction    COLONOSCOPY N/A 10/19/2017    Procedure: COMBINED COLONOSCOPY, SINGLE OR MULTIPLE BIOPSY/POLYPECTOMY BY BIOPSY;  colonoscopy;  Surgeon: Marquise Haskins MD;  Location:  GI    TONSILLECTOMY  1970     Current Outpatient Medications   Medication Sig Dispense Refill    buPROPion (WELLBUTRIN XL) 300 MG 24 hr tablet Take 1 tablet by mouth every morning (along with 150mg tab). 90 tablet 1    citalopram (CELEXA) 20 MG tablet TAKE ONE TABLET BY MOUTH ONE TIME DAILY 90 tablet 0    hydrochlorothiazide (HYDRODIURIL) 25 MG tablet Take 1 tablet (25 mg) by mouth daily. 90 tablet 1    levothyroxine (SYNTHROID/LEVOTHROID) 112 MCG tablet Take 1 tablet (112 mcg) by mouth daily. 90 tablet 3    metroNIDAZOLE (METROCREAM) 0.75 % external cream Apply topically 2 times daily 45 g 0    neomycin-polymyxin-hydrocortisone (CORTISPORIN) 3.5-65961-2 otic solution Place 3 drops into both ears 3 times daily as needed (irritation). 10 mL 0    ALPRAZolam (XANAX) 0.25 MG tablet Take 1 tablet (0.25 mg) by mouth 2 times daily as needed for anxiety (Patient not taking: Reported on 9/30/2024) 14 tablet 0       Allergies   Allergen Reactions    No Known Allergies         Social History     Tobacco Use    Smoking status: Never    Smokeless tobacco: Never   Substance Use Topics    Alcohol use: Yes     Alcohol/week: 0.0 standard drinks of alcohol     Comment: 2 x week        History   Drug Use No             Review of Systems  Constitutional, HEENT, cardiovascular, pulmonary, GI, , musculoskeletal, neuro, skin, endocrine and  "psych systems are negative, except as otherwise noted.    Objective    /86 (BP Location: Right arm, Patient Position: Sitting, Cuff Size: Adult Large)   Pulse 67   Temp 97.7  F (36.5  C) (Temporal)   Resp 16   Ht 1.6 m (5' 3\")   Wt 90.3 kg (199 lb)   LMP  (LMP Unknown)   SpO2 97%   BMI 35.25 kg/m     Estimated body mass index is 35.25 kg/m  as calculated from the following:    Height as of this encounter: 1.6 m (5' 3\").    Weight as of this encounter: 90.3 kg (199 lb).  Physical Exam  GENERAL:  WDWN, no acute distress  PSYCH: pleasant, cooperative  EYES: no discharge, no injection  HENT:  Normocephalic. Moist mucus membranes. Oropharynx pink, uvula midline.  NECK:  Supple, symmetric, no MEGAN  LUNGS:  Clear to auscultation bilaterally without rhonchi, rales, or wheeze. Chest rise symmetric and no tenderness to palpation.  HEART:  Regular rate & rhythm. No murmur, gallop, or rub.  EXTREMITIES:  No gross deformities, moves all 4 limbs spontaneously, no peripheral edema  SKIN:  Warm and dry, no rash or suspicious lesions    NEUROLOGIC:  Alert, sensation grossly intact.    Recent Labs   Lab Test 09/12/24  1114 02/14/24  0913    136   POTASSIUM 3.7 3.7   CR 0.71 0.72        Diagnostics  Recent Results (from the past 720 hour(s))   Lipid panel reflex to direct LDL Non-fasting    Collection Time: 09/12/24 11:14 AM   Result Value Ref Range    Cholesterol 215 (H) <200 mg/dL    Triglycerides 92 <150 mg/dL    Direct Measure HDL 62 >=50 mg/dL    LDL Cholesterol Calculated 135 (H) <100 mg/dL    Non HDL Cholesterol 153 (H) <130 mg/dL    Patient Fasting > 8hrs? Yes    Magnesium    Collection Time: 09/12/24 11:14 AM   Result Value Ref Range    Magnesium 1.9 1.7 - 2.3 mg/dL   Comprehensive metabolic panel    Collection Time: 09/12/24 11:14 AM   Result Value Ref Range    Sodium 138 135 - 145 mmol/L    Potassium 3.7 3.4 - 5.3 mmol/L    Carbon Dioxide (CO2) 27 22 - 29 mmol/L    Anion Gap 12 7 - 15 mmol/L    Urea " Nitrogen 12.1 8.0 - 23.0 mg/dL    Creatinine 0.71 0.51 - 0.95 mg/dL    GFR Estimate >90 >60 mL/min/1.73m2    Calcium 9.6 8.8 - 10.4 mg/dL    Chloride 99 98 - 107 mmol/L    Glucose 98 70 - 99 mg/dL    Alkaline Phosphatase 73 40 - 150 U/L    AST 23 0 - 45 U/L    ALT 21 0 - 50 U/L    Protein Total 7.5 6.4 - 8.3 g/dL    Albumin 4.6 3.5 - 5.2 g/dL    Bilirubin Total 0.4 <=1.2 mg/dL    Patient Fasting > 8hrs? Yes    TSH with free T4 reflex    Collection Time: 09/12/24 11:14 AM   Result Value Ref Range    TSH 3.95 0.30 - 4.20 uIU/mL   Vitamin D Deficiency    Collection Time: 09/12/24 11:14 AM   Result Value Ref Range    Vitamin D, Total (25-Hydroxy) 40 20 - 50 ng/mL      No EKG required for low risk surgery (cataract, skin procedure, breast biopsy, etc).    Revised Cardiac Risk Index (RCRI)  The patient has the following serious cardiovascular risks for perioperative complications:   - No serious cardiac risks = 0 points     RCRI Interpretation: 0 points: Class I (very low risk - 0.4% complication rate)         Signed Electronically by: Lacey Waite PA-C  A copy of this evaluation report is provided to the requesting physician.

## 2024-10-12 ASSESSMENT — PATIENT HEALTH QUESTIONNAIRE - PHQ9: SUM OF ALL RESPONSES TO PHQ QUESTIONS 1-9: 4

## 2024-11-07 ENCOUNTER — E-VISIT (OUTPATIENT)
Dept: FAMILY MEDICINE | Facility: CLINIC | Age: 59
End: 2024-11-07
Payer: COMMERCIAL

## 2024-11-07 DIAGNOSIS — F32.0 MILD MAJOR DEPRESSION (H): ICD-10-CM

## 2024-11-07 DIAGNOSIS — F41.0 PANIC ATTACKS: ICD-10-CM

## 2024-11-07 DIAGNOSIS — F41.9 ANXIETY: ICD-10-CM

## 2024-11-07 PROCEDURE — 99421 OL DIG E/M SVC 5-10 MIN: CPT | Performed by: PHYSICIAN ASSISTANT

## 2024-11-08 RX ORDER — BUPROPION HYDROCHLORIDE 150 MG/1
150 TABLET ORAL EVERY MORNING
Qty: 90 TABLET | Refills: 1 | Status: SHIPPED | OUTPATIENT
Start: 2024-11-08

## 2024-11-08 RX ORDER — ALPRAZOLAM 0.25 MG/1
0.25 TABLET ORAL 2 TIMES DAILY PRN
Qty: 14 TABLET | Refills: 0 | Status: SHIPPED | OUTPATIENT
Start: 2024-11-08

## 2024-11-08 NOTE — PATIENT INSTRUCTIONS
Thank you for checking in. I have adjusted your medication to manage your symptoms. The following medication was sent to your pharmacy:    Orders Placed This Encounter   Medications     buPROPion (WELLBUTRIN XL) 150 MG 24 hr tablet     Sig: Take 1 tablet (150 mg) by mouth every morning. With 300 mg tab     Dispense:  90 tablet     Refill:  1     ALPRAZolam (XANAX) 0.25 MG tablet     Sig: Take 1 tablet (0.25 mg) by mouth 2 times daily as needed for anxiety.     Dispense:  14 tablet     Refill:  0          View your full visit summary for details by clicking on the link below. Your pharmacist will be able to address any questions you may have about the medication.       Please send an eVisit to follow up on this medication change in 4-6 weeks, or sooner if needed.     Thank you for choosing us for your care.

## 2024-12-04 ENCOUNTER — E-VISIT (OUTPATIENT)
Dept: FAMILY MEDICINE | Facility: CLINIC | Age: 59
End: 2024-12-04
Payer: COMMERCIAL

## 2024-12-04 DIAGNOSIS — E66.01 MORBID OBESITY (H): Primary | ICD-10-CM

## 2024-12-04 NOTE — PATIENT INSTRUCTIONS
Thank you for choosing us for your care. I have placed the below referral(s) for you:  Orders Placed This Encounter   Procedures     Adult Comprehensive Weight Management  Referral     Please click the link for your After Visit Summary to view scheduling instructions for your referral. In most cases, you will be contacted within 2 business days to schedule. If you do not hear from a representative within that time, please call [phone] to be connected to a .

## 2024-12-30 ENCOUNTER — E-VISIT (OUTPATIENT)
Dept: FAMILY MEDICINE | Facility: CLINIC | Age: 59
End: 2024-12-30
Payer: COMMERCIAL

## 2024-12-30 DIAGNOSIS — R53.83 FATIGUE, UNSPECIFIED TYPE: Primary | ICD-10-CM

## 2024-12-30 NOTE — PATIENT INSTRUCTIONS
I have placed lab orders for you -- we will be checking your thyroid function, but also blood counts and electrolytes to ensure we are not missing something else. Please click on the scheduling request that you received via My Chart to schedule a lab-only appointment at your earliest convenience. You do not need to fast for these labs.

## 2024-12-31 ENCOUNTER — LAB (OUTPATIENT)
Dept: LAB | Facility: CLINIC | Age: 59
End: 2024-12-31
Payer: COMMERCIAL

## 2024-12-31 DIAGNOSIS — R53.83 FATIGUE, UNSPECIFIED TYPE: ICD-10-CM

## 2024-12-31 LAB
ALBUMIN SERPL BCG-MCNC: 4.5 G/DL (ref 3.5–5.2)
ALP SERPL-CCNC: 67 U/L (ref 40–150)
ALT SERPL W P-5'-P-CCNC: 18 U/L (ref 0–50)
ANION GAP SERPL CALCULATED.3IONS-SCNC: 12 MMOL/L (ref 7–15)
AST SERPL W P-5'-P-CCNC: 19 U/L (ref 0–45)
BASOPHILS # BLD AUTO: 0 10E3/UL (ref 0–0.2)
BASOPHILS NFR BLD AUTO: 1 %
BILIRUB SERPL-MCNC: 0.3 MG/DL
BUN SERPL-MCNC: 14.1 MG/DL (ref 8–23)
CALCIUM SERPL-MCNC: 9.6 MG/DL (ref 8.8–10.4)
CHLORIDE SERPL-SCNC: 101 MMOL/L (ref 98–107)
CREAT SERPL-MCNC: 0.71 MG/DL (ref 0.51–0.95)
EGFRCR SERPLBLD CKD-EPI 2021: >90 ML/MIN/1.73M2
EOSINOPHIL # BLD AUTO: 0.2 10E3/UL (ref 0–0.7)
EOSINOPHIL NFR BLD AUTO: 3 %
ERYTHROCYTE [DISTWIDTH] IN BLOOD BY AUTOMATED COUNT: 12.5 % (ref 10–15)
EST. AVERAGE GLUCOSE BLD GHB EST-MCNC: 108 MG/DL
GLUCOSE SERPL-MCNC: 98 MG/DL (ref 70–99)
HBA1C MFR BLD: 5.4 % (ref 0–5.6)
HCO3 SERPL-SCNC: 26 MMOL/L (ref 22–29)
HCT VFR BLD AUTO: 41.3 % (ref 35–47)
HGB BLD-MCNC: 14.1 G/DL (ref 11.7–15.7)
IMM GRANULOCYTES # BLD: 0 10E3/UL
IMM GRANULOCYTES NFR BLD: 0 %
LYMPHOCYTES # BLD AUTO: 1.9 10E3/UL (ref 0.8–5.3)
LYMPHOCYTES NFR BLD AUTO: 27 %
MAGNESIUM SERPL-MCNC: 1.8 MG/DL (ref 1.7–2.3)
MCH RBC QN AUTO: 32.3 PG (ref 26.5–33)
MCHC RBC AUTO-ENTMCNC: 34.1 G/DL (ref 31.5–36.5)
MCV RBC AUTO: 95 FL (ref 78–100)
MONOCYTES # BLD AUTO: 0.5 10E3/UL (ref 0–1.3)
MONOCYTES NFR BLD AUTO: 7 %
NEUTROPHILS # BLD AUTO: 4.5 10E3/UL (ref 1.6–8.3)
NEUTROPHILS NFR BLD AUTO: 63 %
PLATELET # BLD AUTO: 295 10E3/UL (ref 150–450)
POTASSIUM SERPL-SCNC: 4.2 MMOL/L (ref 3.4–5.3)
PROT SERPL-MCNC: 7.1 G/DL (ref 6.4–8.3)
RBC # BLD AUTO: 4.37 10E6/UL (ref 3.8–5.2)
SODIUM SERPL-SCNC: 139 MMOL/L (ref 135–145)
T4 FREE SERPL-MCNC: 1.23 NG/DL (ref 0.9–1.7)
TSH SERPL DL<=0.005 MIU/L-ACNC: 0.82 UIU/ML (ref 0.3–4.2)
WBC # BLD AUTO: 7.2 10E3/UL (ref 4–11)

## 2024-12-31 PROCEDURE — 85025 COMPLETE CBC W/AUTO DIFF WBC: CPT

## 2024-12-31 PROCEDURE — 36415 COLL VENOUS BLD VENIPUNCTURE: CPT

## 2024-12-31 PROCEDURE — 80053 COMPREHEN METABOLIC PANEL: CPT

## 2024-12-31 PROCEDURE — 83735 ASSAY OF MAGNESIUM: CPT

## 2024-12-31 PROCEDURE — 83036 HEMOGLOBIN GLYCOSYLATED A1C: CPT

## 2024-12-31 PROCEDURE — 84443 ASSAY THYROID STIM HORMONE: CPT

## 2024-12-31 PROCEDURE — 84439 ASSAY OF FREE THYROXINE: CPT

## 2025-01-08 ENCOUNTER — OFFICE VISIT (OUTPATIENT)
Dept: FAMILY MEDICINE | Facility: CLINIC | Age: 60
End: 2025-01-08
Payer: COMMERCIAL

## 2025-01-08 VITALS
DIASTOLIC BLOOD PRESSURE: 78 MMHG | HEIGHT: 64 IN | TEMPERATURE: 97.4 F | HEART RATE: 74 BPM | WEIGHT: 200 LBS | RESPIRATION RATE: 12 BRPM | BODY MASS INDEX: 34.15 KG/M2 | SYSTOLIC BLOOD PRESSURE: 126 MMHG | OXYGEN SATURATION: 98 %

## 2025-01-08 DIAGNOSIS — F32.0 MILD MAJOR DEPRESSION: ICD-10-CM

## 2025-01-08 DIAGNOSIS — F51.04 PSYCHOPHYSIOLOGICAL INSOMNIA: ICD-10-CM

## 2025-01-08 DIAGNOSIS — F41.0 PANIC ATTACKS: ICD-10-CM

## 2025-01-08 DIAGNOSIS — F41.9 ANXIETY: Primary | ICD-10-CM

## 2025-01-08 DIAGNOSIS — R25.1 TREMOR: ICD-10-CM

## 2025-01-08 PROCEDURE — G2211 COMPLEX E/M VISIT ADD ON: HCPCS | Performed by: PHYSICIAN ASSISTANT

## 2025-01-08 PROCEDURE — 99214 OFFICE O/P EST MOD 30 MIN: CPT | Performed by: PHYSICIAN ASSISTANT

## 2025-01-08 RX ORDER — HYDROXYZINE HYDROCHLORIDE 25 MG/1
25-50 TABLET, FILM COATED ORAL
Qty: 60 TABLET | Refills: 0 | Status: SHIPPED | OUTPATIENT
Start: 2025-01-08

## 2025-01-08 RX ORDER — CITALOPRAM HYDROBROMIDE 20 MG/1
30 TABLET ORAL DAILY
Qty: 135 TABLET | Refills: 0 | Status: SHIPPED | OUTPATIENT
Start: 2025-01-08

## 2025-01-08 ASSESSMENT — PAIN SCALES - GENERAL: PAINLEVEL_OUTOF10: NO PAIN (0)

## 2025-01-08 NOTE — PROGRESS NOTES
Assessment & Plan       ICD-10-CM    1. Anxiety  F41.9 citalopram (CELEXA) 20 MG tablet     hydrOXYzine HCl (ATARAX) 25 MG tablet      2. Psychophysiological insomnia  F51.04 hydrOXYzine HCl (ATARAX) 25 MG tablet      3. Tremor  R25.1       4. Mild major depression  F32.0 citalopram (CELEXA) 20 MG tablet      5. Panic attacks  F41.0 citalopram (CELEXA) 20 MG tablet    improved ,not needing xanax any more        Reviewed recent lab work as below -- no obvious etiology on labs nor exam. Recommend use of compression stockings during prolonged standing, as may simply be some mild venous congestion. Likely due to increased anxiety given similar response in the past. Will increase citalopram to 30 mg every day; may take 4-6 weeks to see full effect. Atarax at bedtime prn anxiety, insomnia. Continue use of Xanax prn panic symptoms.     The longitudinal plan of care for the diagnosis(es)/condition(s) as documented were addressed during this visit. Due to the added complexity in care, I will continue to support Taylor in the subsequent management and with ongoing continuity of care.    Subjective   Taylor is a 59 year old, presenting for the following health issues:  Musculoskeletal Problem (X4 Months- Legs and hands are shaky/trembling. No pain associated with the shaking. Patient states feeling fatigue. )        1/8/2025    10:43 AM   Additional Questions   Roomed by Edgar CARBAJAL     History of Present Illness       Hyperlipidemia:  She presents for follow up of hyperlipidemia.   She is not taking medication to lower cholesterol. She is not having myalgia or other side effects to statin medications.    Hypothyroidism:     Since last visit, patient describes the following symptoms::  Anxiety, Fatigue and Tremors    Reason for visit:  Follow up to test results concerning weak and shking legs    She eats 2-3 servings of fruits and vegetables daily.She consumes 0 sweetened beverage(s) daily.She exercises with enough effort to  "increase her heart rate 10 to 19 minutes per day.  She exercises with enough effort to increase her heart rate 3 or less days per week.   She is taking medications regularly.     Patient notes shaking of bilat lower legs over the past month or so. Happened several years ago, extensive work-up done, all unremarkable and thought due to anxiety. She notes legs start to shake with prolonged standing. 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 Patient does endorse increased anxiety and fatigue. She is waking earlier than usual and unable to fall back asleep due to anxiety. Wellbutrin increased in November 2024 without much change to anxiety. Remains on citalopram 20 mg. Rare use of Xanax for panic symptoms.    Review of Systems  Constitutional, HEENT, cardiovascular, pulmonary, GI, , musculoskeletal, neuro, skin, endocrine and psych systems are negative, except as otherwise noted.      Objective    /78   Pulse 74   Temp 97.4  F (36.3  C) (Temporal)   Resp 12   Ht 1.633 m (5' 4.29\")   Wt 90.7 kg (200 lb)   LMP  (LMP Unknown)   SpO2 98%   BMI 34.02 kg/m    Body mass index is 34.02 kg/m .  Physical Exam   GENERAL:  WDWN, no acute distress  PSYCH: pleasant, cooperative  EYES: no discharge, no injection  HENT:  Normocephalic. Moist mucus membranes.  NECK:  Supple, symmetric  EXTREMITIES:  No gross deformities, moves all 4 limbs spontaneously, no peripheral edema. BLE unremarkable  SKIN:  Warm and dry, no rash or suspicious lesions    NEUROLOGIC:  Alert, sensation grossly intact. DTRs 2+ bilat. Strength 5/5 throughout BUE and BLE.     Lab on 12/31/2024   Component Date Value Ref Range Status    TSH 12/31/2024 0.82  0.30 - 4.20 uIU/mL Final    Free T4 12/31/2024 1.23  0.90 - 1.70 ng/dL Final    Magnesium 12/31/2024 1.8  1.7 - 2.3 mg/dL Final    Sodium 12/31/2024 139  135 - 145 mmol/L Final    Potassium 12/31/2024 4.2  3.4 " - 5.3 mmol/L Final    Carbon Dioxide (CO2) 12/31/2024 26  22 - 29 mmol/L Final    Anion Gap 12/31/2024 12  7 - 15 mmol/L Final    Urea Nitrogen 12/31/2024 14.1  8.0 - 23.0 mg/dL Final    Creatinine 12/31/2024 0.71  0.51 - 0.95 mg/dL Final    GFR Estimate 12/31/2024 >90  >60 mL/min/1.73m2 Final    eGFR calculated using 2021 CKD-EPI equation.    Calcium 12/31/2024 9.6  8.8 - 10.4 mg/dL Final    Reference intervals for this test were updated on 7/16/2024 to reflect our healthy population more accurately. There may be differences in the flagging of prior results with similar values performed with this method. Those prior results can be interpreted in the context of the updated reference intervals.    Chloride 12/31/2024 101  98 - 107 mmol/L Final    Glucose 12/31/2024 98  70 - 99 mg/dL Final    Alkaline Phosphatase 12/31/2024 67  40 - 150 U/L Final    AST 12/31/2024 19  0 - 45 U/L Final    ALT 12/31/2024 18  0 - 50 U/L Final    Protein Total 12/31/2024 7.1  6.4 - 8.3 g/dL Final    Albumin 12/31/2024 4.5  3.5 - 5.2 g/dL Final    Bilirubin Total 12/31/2024 0.3  <=1.2 mg/dL Final    Estimated Average Glucose 12/31/2024 108  <117 mg/dL Final    Hemoglobin A1C 12/31/2024 5.4  0.0 - 5.6 % Final    Normal <5.7%   Prediabetes 5.7-6.4%    Diabetes 6.5% or higher     Note: Adopted from ADA consensus guidelines.    WBC Count 12/31/2024 7.2  4.0 - 11.0 10e3/uL Final    RBC Count 12/31/2024 4.37  3.80 - 5.20 10e6/uL Final    Hemoglobin 12/31/2024 14.1  11.7 - 15.7 g/dL Final    Hematocrit 12/31/2024 41.3  35.0 - 47.0 % Final    MCV 12/31/2024 95  78 - 100 fL Final    MCH 12/31/2024 32.3  26.5 - 33.0 pg Final    MCHC 12/31/2024 34.1  31.5 - 36.5 g/dL Final    RDW 12/31/2024 12.5  10.0 - 15.0 % Final    Platelet Count 12/31/2024 295  150 - 450 10e3/uL Final    % Neutrophils 12/31/2024 63  % Final    % Lymphocytes 12/31/2024 27  % Final    % Monocytes 12/31/2024 7  % Final    % Eosinophils 12/31/2024 3  % Final    % Basophils  12/31/2024 1  % Final    % Immature Granulocytes 12/31/2024 0  % Final    Absolute Neutrophils 12/31/2024 4.5  1.6 - 8.3 10e3/uL Final    Absolute Lymphocytes 12/31/2024 1.9  0.8 - 5.3 10e3/uL Final    Absolute Monocytes 12/31/2024 0.5  0.0 - 1.3 10e3/uL Final    Absolute Eosinophils 12/31/2024 0.2  0.0 - 0.7 10e3/uL Final    Absolute Basophils 12/31/2024 0.0  0.0 - 0.2 10e3/uL Final    Absolute Immature Granulocytes 12/31/2024 0.0  <=0.4 10e3/uL Final           Signed Electronically by: Lacey Waite PA-C

## 2025-04-19 ENCOUNTER — MYC REFILL (OUTPATIENT)
Dept: FAMILY MEDICINE | Facility: CLINIC | Age: 60
End: 2025-04-19
Payer: COMMERCIAL

## 2025-04-19 DIAGNOSIS — F41.0 PANIC ATTACKS: ICD-10-CM

## 2025-04-19 DIAGNOSIS — F51.04 PSYCHOPHYSIOLOGICAL INSOMNIA: ICD-10-CM

## 2025-04-19 DIAGNOSIS — F32.0 MILD MAJOR DEPRESSION: ICD-10-CM

## 2025-04-19 DIAGNOSIS — F41.9 ANXIETY: ICD-10-CM

## 2025-04-21 RX ORDER — ALPRAZOLAM 0.25 MG
0.25 TABLET ORAL 2 TIMES DAILY PRN
Qty: 14 TABLET | Refills: 0 | Status: SHIPPED | OUTPATIENT
Start: 2025-04-21

## 2025-04-21 RX ORDER — CITALOPRAM HYDROBROMIDE 20 MG/1
30 TABLET ORAL DAILY
Qty: 135 TABLET | Refills: 1 | Status: SHIPPED | OUTPATIENT
Start: 2025-04-21

## 2025-04-21 RX ORDER — BUPROPION HYDROCHLORIDE 150 MG/1
150 TABLET ORAL EVERY MORNING
Qty: 90 TABLET | Refills: 1 | Status: SHIPPED | OUTPATIENT
Start: 2025-04-21

## 2025-04-21 RX ORDER — HYDROXYZINE HYDROCHLORIDE 25 MG/1
25-50 TABLET, FILM COATED ORAL
Qty: 60 TABLET | Refills: 1 | Status: SHIPPED | OUTPATIENT
Start: 2025-04-21

## 2025-04-24 DIAGNOSIS — I10 HYPERTENSION, GOAL BELOW 140/90: ICD-10-CM

## 2025-04-24 DIAGNOSIS — F32.0 MILD MAJOR DEPRESSION: ICD-10-CM

## 2025-04-24 DIAGNOSIS — I10 ESSENTIAL HYPERTENSION: ICD-10-CM

## 2025-04-24 RX ORDER — BUPROPION HYDROCHLORIDE 300 MG/1
TABLET ORAL
Qty: 90 TABLET | Refills: 0 | Status: SHIPPED | OUTPATIENT
Start: 2025-04-24

## 2025-04-24 RX ORDER — HYDROCHLOROTHIAZIDE 25 MG/1
25 TABLET ORAL DAILY
Qty: 90 TABLET | Refills: 0 | Status: SHIPPED | OUTPATIENT
Start: 2025-04-24

## 2025-04-24 NOTE — TELEPHONE ENCOUNTER
Bupropion:  Medication is being filled for 1 time refill only due to:   outdated PHQ-9.  Sarah hSin RN, BSN  [facility]

## 2025-04-25 ENCOUNTER — MYC MEDICAL ADVICE (OUTPATIENT)
Dept: FAMILY MEDICINE | Facility: CLINIC | Age: 60
End: 2025-04-25
Payer: COMMERCIAL

## 2025-04-26 DIAGNOSIS — F32.0 MILD MAJOR DEPRESSION: ICD-10-CM

## 2025-04-28 RX ORDER — BUPROPION HYDROCHLORIDE 300 MG/1
TABLET ORAL
Qty: 90 TABLET | Refills: 0 | OUTPATIENT
Start: 2025-04-28

## 2025-04-29 ENCOUNTER — MYC MEDICAL ADVICE (OUTPATIENT)
Dept: FAMILY MEDICINE | Facility: CLINIC | Age: 60
End: 2025-04-29
Payer: COMMERCIAL

## 2025-04-29 DIAGNOSIS — I10 HYPERTENSION, GOAL BELOW 140/90: ICD-10-CM

## 2025-04-29 DIAGNOSIS — F32.0 MILD MAJOR DEPRESSION: ICD-10-CM

## 2025-04-29 DIAGNOSIS — I10 ESSENTIAL HYPERTENSION: ICD-10-CM

## 2025-04-30 RX ORDER — HYDROCHLOROTHIAZIDE 25 MG/1
25 TABLET ORAL DAILY
Qty: 90 TABLET | Refills: 0 | Status: SHIPPED | OUTPATIENT
Start: 2025-04-30

## 2025-04-30 RX ORDER — BUPROPION HYDROCHLORIDE 300 MG/1
TABLET ORAL
Qty: 90 TABLET | Refills: 0 | Status: SHIPPED | OUTPATIENT
Start: 2025-04-30

## 2025-05-12 ENCOUNTER — E-VISIT (OUTPATIENT)
Dept: FAMILY MEDICINE | Facility: CLINIC | Age: 60
End: 2025-05-12
Payer: COMMERCIAL

## 2025-05-12 DIAGNOSIS — K62.89 RECTAL PAIN: Primary | ICD-10-CM

## 2025-05-12 NOTE — PATIENT INSTRUCTIONS
Thank you for choosing us for your care. I have placed an order for a prescription so that you can start treatment. This medication needs to be compounded, which is only done by specific pharmacies. I have sent this to the [facility] -- they will ship it out to your home. You may call them at [phone] to update your profile and validate your home address. View your full visit summary for details by clicking on the link below. Your pharmacist will able to address any questions you may have about the medication.

## 2025-07-29 DIAGNOSIS — I10 HYPERTENSION, GOAL BELOW 140/90: ICD-10-CM

## 2025-07-29 DIAGNOSIS — F32.0 MILD MAJOR DEPRESSION: ICD-10-CM

## 2025-07-29 DIAGNOSIS — I10 ESSENTIAL HYPERTENSION: ICD-10-CM

## 2025-07-29 RX ORDER — BUPROPION HYDROCHLORIDE 300 MG/1
TABLET ORAL
Qty: 90 TABLET | Refills: 0 | Status: SHIPPED | OUTPATIENT
Start: 2025-07-29

## 2025-07-29 RX ORDER — HYDROCHLOROTHIAZIDE 25 MG/1
25 TABLET ORAL DAILY
Qty: 90 TABLET | Refills: 0 | Status: SHIPPED | OUTPATIENT
Start: 2025-07-29

## 2025-08-02 ENCOUNTER — MYC REFILL (OUTPATIENT)
Dept: FAMILY MEDICINE | Facility: CLINIC | Age: 60
End: 2025-08-02
Payer: COMMERCIAL

## 2025-08-02 DIAGNOSIS — F41.0 PANIC ATTACKS: ICD-10-CM

## 2025-08-02 DIAGNOSIS — I10 HYPERTENSION, GOAL BELOW 140/90: ICD-10-CM

## 2025-08-02 DIAGNOSIS — F32.0 MILD MAJOR DEPRESSION: ICD-10-CM

## 2025-08-02 DIAGNOSIS — F41.9 ANXIETY: ICD-10-CM

## 2025-08-02 DIAGNOSIS — I10 ESSENTIAL HYPERTENSION: ICD-10-CM

## 2025-08-04 RX ORDER — HYDROCHLOROTHIAZIDE 25 MG/1
25 TABLET ORAL DAILY
Qty: 90 TABLET | Refills: 0 | OUTPATIENT
Start: 2025-08-04

## 2025-08-04 RX ORDER — CITALOPRAM HYDROBROMIDE 20 MG/1
30 TABLET ORAL DAILY
Qty: 135 TABLET | Refills: 1 | OUTPATIENT
Start: 2025-08-04

## 2025-08-04 RX ORDER — BUPROPION HYDROCHLORIDE 300 MG/1
TABLET ORAL
Qty: 90 TABLET | Refills: 0 | OUTPATIENT
Start: 2025-08-04

## 2025-08-13 ENCOUNTER — PATIENT OUTREACH (OUTPATIENT)
Dept: CARE COORDINATION | Facility: CLINIC | Age: 60
End: 2025-08-13
Payer: COMMERCIAL

## 2025-08-27 ENCOUNTER — PATIENT OUTREACH (OUTPATIENT)
Dept: CARE COORDINATION | Facility: CLINIC | Age: 60
End: 2025-08-27
Payer: COMMERCIAL

## (undated) RX ORDER — FENTANYL CITRATE 50 UG/ML
INJECTION, SOLUTION INTRAMUSCULAR; INTRAVENOUS
Status: DISPENSED
Start: 2017-10-19